# Patient Record
Sex: FEMALE | Race: WHITE | NOT HISPANIC OR LATINO
[De-identification: names, ages, dates, MRNs, and addresses within clinical notes are randomized per-mention and may not be internally consistent; named-entity substitution may affect disease eponyms.]

---

## 2018-02-01 ENCOUNTER — TRANSCRIPTION ENCOUNTER (OUTPATIENT)
Age: 58
End: 2018-02-01

## 2018-02-15 ENCOUNTER — MESSAGE (OUTPATIENT)
Age: 58
End: 2018-02-15

## 2018-05-11 ENCOUNTER — RESULT REVIEW (OUTPATIENT)
Age: 58
End: 2018-05-11

## 2018-06-02 ENCOUNTER — OUTPATIENT (OUTPATIENT)
Dept: OUTPATIENT SERVICES | Facility: HOSPITAL | Age: 58
LOS: 1 days | End: 2018-06-02
Payer: COMMERCIAL

## 2018-06-02 ENCOUNTER — APPOINTMENT (OUTPATIENT)
Dept: MAMMOGRAPHY | Facility: CLINIC | Age: 58
End: 2018-06-02
Payer: COMMERCIAL

## 2018-06-02 DIAGNOSIS — Z00.8 ENCOUNTER FOR OTHER GENERAL EXAMINATION: ICD-10-CM

## 2018-06-02 PROCEDURE — 77067 SCR MAMMO BI INCL CAD: CPT | Mod: 26

## 2018-06-02 PROCEDURE — 77063 BREAST TOMOSYNTHESIS BI: CPT

## 2018-06-02 PROCEDURE — 77063 BREAST TOMOSYNTHESIS BI: CPT | Mod: 26

## 2018-06-02 PROCEDURE — 77067 SCR MAMMO BI INCL CAD: CPT

## 2018-11-03 ENCOUNTER — TRANSCRIPTION ENCOUNTER (OUTPATIENT)
Age: 58
End: 2018-11-03

## 2020-04-25 ENCOUNTER — MESSAGE (OUTPATIENT)
Age: 60
End: 2020-04-25

## 2020-05-01 LAB
SARS-COV-2 IGG SERPL IA-ACNC: 0 INDEX
SARS-COV-2 IGG SERPL QL IA: NEGATIVE

## 2022-01-05 ENCOUNTER — EMERGENCY (EMERGENCY)
Facility: HOSPITAL | Age: 62
LOS: 0 days | Discharge: HOME | End: 2022-01-05
Attending: EMERGENCY MEDICINE
Payer: SELF-PAY

## 2022-01-05 VITALS
TEMPERATURE: 98 F | HEART RATE: 75 BPM | SYSTOLIC BLOOD PRESSURE: 156 MMHG | OXYGEN SATURATION: 100 % | RESPIRATION RATE: 17 BRPM | DIASTOLIC BLOOD PRESSURE: 82 MMHG

## 2022-01-05 VITALS — HEIGHT: 72 IN | WEIGHT: 149.91 LBS

## 2022-01-05 DIAGNOSIS — S09.90XA UNSPECIFIED INJURY OF HEAD, INITIAL ENCOUNTER: ICD-10-CM
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
## 2022-01-05 DIAGNOSIS — Z23 ENCOUNTER FOR IMMUNIZATION: ICD-10-CM
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
## 2022-01-05 DIAGNOSIS — S10.91XA ABRASION OF UNSPECIFIED PART OF NECK, INITIAL ENCOUNTER: ICD-10-CM

## 2022-01-05 PROCEDURE — 70450 CT HEAD/BRAIN W/O DYE: CPT | Mod: MG

## 2022-01-05 PROCEDURE — 99053 MED SERV 10PM-8AM 24 HR FAC: CPT

## 2022-01-05 PROCEDURE — 96372 THER/PROPH/DIAG INJ SC/IM: CPT

## 2022-01-05 PROCEDURE — 72125 CT NECK SPINE W/O DYE: CPT | Mod: MG

## 2022-01-05 PROCEDURE — G1004: CPT

## 2022-01-05 PROCEDURE — 70450 CT HEAD/BRAIN W/O DYE: CPT | Mod: 26,MG

## 2022-01-05 PROCEDURE — 72125 CT NECK SPINE W/O DYE: CPT | Mod: 26,MG

## 2022-01-05 PROCEDURE — 99284 EMERGENCY DEPT VISIT MOD MDM: CPT | Mod: 25

## 2022-01-05 PROCEDURE — 90715 TDAP VACCINE 7 YRS/> IM: CPT

## 2022-01-05 PROCEDURE — 99285 EMERGENCY DEPT VISIT HI MDM: CPT

## 2022-01-05 RX ORDER — ACETAMINOPHEN 500 MG
650 TABLET ORAL ONCE
Refills: 0 | Status: COMPLETED | OUTPATIENT
Start: 2022-01-05 | End: 2022-01-05

## 2022-01-05 RX ORDER — TETANUS TOXOID, REDUCED DIPHTHERIA TOXOID AND ACELLULAR PERTUSSIS VACCINE, ADSORBED 5; 2.5; 8; 8; 2.5 [IU]/.5ML; [IU]/.5ML; UG/.5ML; UG/.5ML; UG/.5ML
0.5 SUSPENSION INTRAMUSCULAR ONCE
Refills: 0 | Status: COMPLETED | OUTPATIENT
Start: 2022-01-05 | End: 2022-01-05

## 2022-01-05 RX ADMIN — TETANUS TOXOID, REDUCED DIPHTHERIA TOXOID AND ACELLULAR PERTUSSIS VACCINE, ADSORBED 0.5 MILLILITER(S): 5; 2.5; 8; 8; 2.5 SUSPENSION INTRAMUSCULAR at 09:17

## 2022-01-05 RX ADMIN — Medication 650 MILLIGRAM(S): at 10:19

## 2022-01-05 NOTE — ED PROVIDER NOTE - OBJECTIVE STATEMENT
60 y/o female with no significant PMHx presents to the ED s/p fall. 60 y/o female with no significant PMHx presents to the ED s/p fall. pt was in parking lot and slipped on black ice, pt hit front of head. not on AC. no LOC. now w/ pain in head and neck. + abrasion, unknown last tetanus.

## 2022-01-05 NOTE — ED ADULT NURSE NOTE - OBJECTIVE STATEMENT
pt report to ED s/p slip and fall in the parking lot walking into work, pt fell forward hitting her head, lip and left knee - LOC - vision changes or dizziness, denies blood thinners

## 2022-01-05 NOTE — ED PROVIDER NOTE - PHYSICAL EXAMINATION
GEN - NAD; well appearing; A+O x3   HEAD -  left frontal hematoma w/ abrasion   EYES - EOMI, no conjunctival pallor, no scleral icterus  ENT -   mucous membranes  moist , no discharge      NECK - Neck supple  PULM - CTA b/l,  symmetric breath sounds  COR -  RRR, S1 S2, no murmurs  ABD - , ND, NT, soft, no guarding, no rebound, no masses    BACK - no CVA tenderness, nontender spine     EXTREMS - no edema, no deformity, warm and well perfused    SKIN - no rash or bruising      NEUROLOGIC - alert, sensation nl, motor 5/5 RUE/LUE/RLE/LLE

## 2022-01-05 NOTE — ED PROVIDER NOTE - PATIENT PORTAL LINK FT
You can access the FollowMyHealth Patient Portal offered by [facility] by registering at the following website: [email or web address]. By joining [employer]’s FollowMyHealth portal, you will also be able to view your health information using other applications (apps) compatible with our system.

## 2022-01-05 NOTE — ED ADULT TRIAGE NOTE - CHIEF COMPLAINT QUOTE
patient fell in parking lot while walking into work.  patient c/o left knee, lip and head pain.  contusion noted to forehead.  patient denies anticoagulants and LOC.

## 2022-01-05 NOTE — ED PROVIDER NOTE - NSFOLLOWUPINSTRUCTIONS_ED_ALL_ED_FT
HEAD INJURY - AfterCare(R) Instructions(ER/ED)           Head Injury    WHAT YOU NEED TO KNOW:    A head injury can include your scalp, face, skull, or brain and range from mild to severe. Effects can appear immediately after the injury or develop later. The effects may last a short time or be permanent. Healthcare providers may want to check your recovery over time. Treatment may change as you recover or develop new health problems from the head injury.    DISCHARGE INSTRUCTIONS:    Call your local emergency number (911 in the [place]), or have someone else call if:   •You cannot be woken.      •You have a seizure.      •You stop responding to others or you faint.      •You have blurry or double vision.      •Your speech becomes slurred or confused.      •You have arm or leg weakness, loss of feeling, or new problems with coordination.      •Your pupils are larger than usual, or one pupil is a different size than the other.      •You have blood or clear fluid coming out of your ears or nose.      Return to the emergency department if:   •You have repeated or forceful vomiting.      •You feel confused.      •Your headache gets worse or becomes severe.      •You or someone caring for you notices that you are harder to wake than usual.      Call your doctor if:   •Your symptoms last longer than 6 weeks after the injury.      •You have questions or concerns about your condition or care.      Medicines:   •Acetaminophen decreases pain and fever. It is available without a doctor's order. Ask how much to take and how often to take it. Follow directions. Read the labels of all other medicines you are using to see if they also contain acetaminophen, or ask your doctor or pharmacist. Acetaminophen can cause liver damage if not taken correctly. Do not use more than 4 grams (4,000 milligrams) total of acetaminophen in one day.       •Take your medicine as directed. Contact your healthcare provider if you think your medicine is not helping or if you have side effects. Tell him or her if you are allergic to any medicine. Keep a list of the medicines, vitamins, and herbs you take. Include the amounts, and when and why you take them. Bring the list or the pill bottles to follow-up visits. Carry your medicine list with you in case of an emergency.      Self-care:   •Rest or do quiet activities. Limit your time watching TV, using the computer, or doing tasks that require a lot of thinking. Slowly return to your normal activities as directed. Do not play sports or do activities that may cause you to get hit in the head. Ask your healthcare provider when you can return to sports.      •Apply ice on your head for 15 to 20 minutes every hour or as directed. Use an ice pack, or put crushed ice in a plastic bag. Cover it with a towel before you apply it to your skin. Ice helps prevent tissue damage and decreases swelling and pain.      •Have someone stay with you for 24 hours , or as directed. This person can monitor you for problems and call for help if needed. When you are awake, the person should ask you a few questions every few hours to see if you are thinking clearly. An example is to ask your name or address.      Prevent another head injury:   •Wear a helmet that fits properly. Do this when you play sports, or ride a bike, scooter, or skateboard. Helmets help decrease your risk for a serious head injury. Talk to your healthcare provider about other ways you can protect yourself if you play sports.      •Wear your seatbelt every time you are in a car. This helps lower your risk for a head injury if you are in a car accident.      Follow up with your doctor as directed: Write down your questions so you remember to ask them during your visits.       © Copyright [employer] 2022           back to top                          © Copyright [employer] 2022 Head Injury    WHAT YOU NEED TO KNOW:    A head injury can include your scalp, face, skull, or brain and range from mild to severe. Effects can appear immediately after the injury or develop later. The effects may last a short time or be permanent. Healthcare providers may want to check your recovery over time. Treatment may change as you recover or develop new health problems from the head injury.    DISCHARGE INSTRUCTIONS:    Call your local emergency number (911 in the [place]), or have someone else call if:   •You cannot be woken.      •You have a seizure.      •You stop responding to others or you faint.      •You have blurry or double vision.      •Your speech becomes slurred or confused.      •You have arm or leg weakness, loss of feeling, or new problems with coordination.      •Your pupils are larger than usual, or one pupil is a different size than the other.      •You have blood or clear fluid coming out of your ears or nose.      Return to the emergency department if:   •You have repeated or forceful vomiting.      •You feel confused.      •Your headache gets worse or becomes severe.      •You or someone caring for you notices that you are harder to wake than usual.      Call your doctor if:   •Your symptoms last longer than 6 weeks after the injury.      •You have questions or concerns about your condition or care.      Medicines:   •Acetaminophen decreases pain and fever. It is available without a doctor's order. Ask how much to take and how often to take it. Follow directions. Read the labels of all other medicines you are using to see if they also contain acetaminophen, or ask your doctor or pharmacist. Acetaminophen can cause liver damage if not taken correctly. Do not use more than 4 grams (4,000 milligrams) total of acetaminophen in one day.       •Take your medicine as directed. Contact your healthcare provider if you think your medicine is not helping or if you have side effects. Tell him or her if you are allergic to any medicine. Keep a list of the medicines, vitamins, and herbs you take. Include the amounts, and when and why you take them. Bring the list or the pill bottles to follow-up visits. Carry your medicine list with you in case of an emergency.      Self-care:   •Rest or do quiet activities. Limit your time watching TV, using the computer, or doing tasks that require a lot of thinking. Slowly return to your normal activities as directed. Do not play sports or do activities that may cause you to get hit in the head. Ask your healthcare provider when you can return to sports.      •Apply ice on your head for 15 to 20 minutes every hour or as directed. Use an ice pack, or put crushed ice in a plastic bag. Cover it with a towel before you apply it to your skin. Ice helps prevent tissue damage and decreases swelling and pain.      •Have someone stay with you for 24 hours , or as directed. This person can monitor you for problems and call for help if needed. When you are awake, the person should ask you a few questions every few hours to see if you are thinking clearly. An example is to ask your name or address.      Prevent another head injury:   •Wear a helmet that fits properly. Do this when you play sports, or ride a bike, scooter, or skateboard. Helmets help decrease your risk for a serious head injury. Talk to your healthcare provider about other ways you can protect yourself if you play sports.      •Wear your seatbelt every time you are in a car. This helps lower your risk for a head injury if you are in a car accident.      Follow up with your doctor as directed: Write down your questions so you remember to ask them during your visits.       © Copyright [employer] 2022           back to top                          © Copyright [employer] 2022

## 2022-01-19 ENCOUNTER — APPOINTMENT (OUTPATIENT)
Dept: ORTHOPEDIC SURGERY | Facility: CLINIC | Age: 62
End: 2022-01-19
Payer: OTHER MISCELLANEOUS

## 2022-01-19 VITALS
BODY MASS INDEX: 20.32 KG/M2 | WEIGHT: 150 LBS | HEART RATE: 111 BPM | SYSTOLIC BLOOD PRESSURE: 127 MMHG | HEIGHT: 72 IN | DIASTOLIC BLOOD PRESSURE: 89 MMHG

## 2022-01-19 DIAGNOSIS — Z78.9 OTHER SPECIFIED HEALTH STATUS: ICD-10-CM

## 2022-01-19 PROCEDURE — 99202 OFFICE O/P NEW SF 15 MIN: CPT

## 2022-01-19 PROCEDURE — 99072 ADDL SUPL MATRL&STAF TM PHE: CPT

## 2022-01-19 PROCEDURE — 73560 X-RAY EXAM OF KNEE 1 OR 2: CPT | Mod: LT

## 2022-01-24 VITALS — WEIGHT: 150 LBS | HEIGHT: 72 IN | BODY MASS INDEX: 20.32 KG/M2

## 2022-02-02 ENCOUNTER — APPOINTMENT (OUTPATIENT)
Dept: ORTHOPEDIC SURGERY | Facility: CLINIC | Age: 62
End: 2022-02-02
Payer: OTHER MISCELLANEOUS

## 2022-02-02 DIAGNOSIS — S80.02XA CONTUSION OF LEFT KNEE, INITIAL ENCOUNTER: ICD-10-CM

## 2022-02-02 DIAGNOSIS — M17.12 UNILATERAL PRIMARY OSTEOARTHRITIS, LEFT KNEE: ICD-10-CM

## 2022-02-02 PROCEDURE — 99072 ADDL SUPL MATRL&STAF TM PHE: CPT

## 2022-02-02 PROCEDURE — 99212 OFFICE O/P EST SF 10 MIN: CPT

## 2022-02-07 PROBLEM — S80.02XA CONTUSION OF LEFT KNEE, INITIAL ENCOUNTER: Status: ACTIVE | Noted: 2022-01-19

## 2022-02-07 PROBLEM — M17.12 PRIMARY OSTEOARTHRITIS OF LEFT KNEE: Status: ACTIVE | Noted: 2022-01-19

## 2022-02-07 NOTE — REASON FOR VISIT
[Follow-Up Visit] : a follow-up visit for [FreeTextEntry2] : the left knee.  This visit is related to Workers' Compensation.  Date of Injury:  1/05/2022

## 2022-02-07 NOTE — DISCUSSION/SUMMARY
[de-identified] : At this time, due to osteoarthritis with contusion of the left knee, I recommend she undergo home therapeutic modalities and wound care. She will be reassessed in two weeks. I will allow her to return to work. \par \par The Workers' Compensation guidelines have been followed.\par

## 2022-02-07 NOTE — PHYSICAL EXAM
[de-identified] : Left Knee: \par Range of Motion in Degrees	\par 	  Claimant:	Normal:	\par Flexion Active	 120 	 135-degrees	\par Flexion Passive	 120	 135-degrees	\par Extension Active	 0	 0-5-degrees	\par Extension Passive	 0	 0-5-degrees	\par \par Significant prepatellar contusion with disruption of the soft tissue. No gross signs of infection. No weakness to flexion/extension. No evidence of instability in the AP plane or varus or valgus stress. Negative Lachman. Negative pivot shift. Negative anterior drawer test. Negative posterior drawer test. Negative Herminio. Negative Apley grind. No medial or lateral joint line tenderness. Positive tenderness over the medial and lateral facet of the patella. Positive patellofemoral crepitations. No lateral tilting patella. No patella apprehension. Positive crepitation in the medial and lateral femoral condyle. No proximal or distal swelling, edema or tenderness. No gross motor or sensory deficits. Mild intra-articular swelling. 2+ DP and PT pulses. Moderate valgus alignment. \par  [de-identified] : Ambulating with a slightly antalgic to antalgic gait.  Station:  Normal.  [de-identified] : Appearance:  Well-developed, well-nourished female in no acute distress.\par

## 2022-02-07 NOTE — ADDENDUM
[FreeTextEntry1] : This note was written by Sophie Read on 01/24/2022 acting as a scribe for KATY LEONARDO III, MD

## 2022-02-07 NOTE — HISTORY OF PRESENT ILLNESS
[(Does not interfere) 0] : the ailment interference is 0/10 (does not interfere) [de-identified] : The patient comes in today for her left knee status post a slip and fall in the parking lot at work.  She states that her pain has gone down. The patient states the onset/injury occurred on 1/5/2022.  [] : No

## 2022-02-07 NOTE — PHYSICAL EXAM
[de-identified] : Right Knee: \par Range of Motion in Degrees	\par 	                  Claimant:	Normal:	\par Flexion Active	  135 	                135-degrees	\par Flexion Passive	  135	                135-degrees	\par Extension Active	  0-5	                0-5-degrees	\par Extension Passive	  0-5	                0-5-degrees	\par \par No weakness to flexion/extension.  No evidence of instability in the AP plane or varus or valgus stress.  Negative  Lachman.  Negative pivot shift.  Negative anterior drawer test.  Negative posterior drawer test.  Negative Herminio.  Negative Apley grind.  No medial or lateral joint line tenderness.  No tenderness over the medial and lateral facet of the patella.  No patellofemoral crepitations.  No lateral tilting patella.  No patellar apprehension.  No crepitation in the medial and lateral femoral condyle.  No proximal or distal swelling, edema or tenderness.  No gross motor or sensory deficits.  No intra-articular swelling.  2+ DP and PT pulses. No varus or valgus malalignment.  Skin is intact.  No rashes, scars or lesions.  \par \par Left Knee: \par Range of Motion in Degrees	\par 	                  Claimant:	Normal:	\par Flexion Active	  120 	                135-degrees	\par Flexion Passive	  120	                135-degrees	\par Extension Active	    0	                0-5-degrees	\par Extension Passive	    0	                0-5-degrees	\par \par Significant prepatellar contusion with disruption of the soft tissue.  No gross signs of infection. No weakness to flexion/extension.  No evidence of instability in the AP plane or varus or valgus stress.  Negative  Lachman.  Negative pivot shift.  Negative anterior drawer test.  Negative posterior drawer test.  Negative Herminio.  Negative Apley grind.  No medial or lateral joint line tenderness.  Positive tenderness over the medial and lateral facet of the patella.  Positive patellofemoral crepitations.  No lateral tilting patella.  No patella apprehension.  Positive crepitation in the medial and lateral femoral condyle.  No proximal or distal swelling, edema or tenderness.  No gross motor or sensory deficits.  Mild intra-articular swelling.  2+ DP and PT pulses.  Moderate valgus alignment.     \par   [de-identified] : Ambulating with a slightly antalgic to antalgic gait.  Station:  Normal.  [de-identified] : Appearance:  Well-developed, well-nourished female in no acute distress.\par   [de-identified] : Radiographs, two views of the left knee, shows severe arthritis with valgus alignment.

## 2022-02-07 NOTE — DISCUSSION/SUMMARY
[de-identified] : At this time, due to osteoarthritis and contusion of the left knee and since she states she has no pain, we will allow her to return to full activity.  She will follow up with me on an as needed basis.\par \par The Workers' Compensation guidelines have been followed.\par

## 2022-02-07 NOTE — REASON FOR VISIT
[Initial Visit] : an initial visit for [FreeTextEntry2] : her left knee.  This injury is related to Workers' Compensation

## 2022-02-07 NOTE — ADDENDUM
[FreeTextEntry1] : This note was written by Sophie Read on 02/07/2022 acting as a scribe for KATY LEONARDO III, MD

## 2023-10-31 ENCOUNTER — INPATIENT (INPATIENT)
Facility: HOSPITAL | Age: 63
LOS: 8 days | Discharge: NURSING, REHAB OR LONG-TERM CARE | DRG: 854 | End: 2023-11-09
Attending: INTERNAL MEDICINE | Admitting: INTERNAL MEDICINE
Payer: COMMERCIAL

## 2023-10-31 VITALS — WEIGHT: 179.9 LBS | HEIGHT: 72 IN

## 2023-10-31 DIAGNOSIS — M86.9 OSTEOMYELITIS, UNSPECIFIED: ICD-10-CM

## 2023-10-31 LAB
ABO RH CONFIRMATION: SIGNIFICANT CHANGE UP
ABO RH CONFIRMATION: SIGNIFICANT CHANGE UP
ALBUMIN SERPL ELPH-MCNC: 2.4 G/DL — LOW (ref 3.3–5)
ALBUMIN SERPL ELPH-MCNC: 2.4 G/DL — LOW (ref 3.3–5)
ALP SERPL-CCNC: 111 U/L — SIGNIFICANT CHANGE UP (ref 40–120)
ALP SERPL-CCNC: 111 U/L — SIGNIFICANT CHANGE UP (ref 40–120)
ALT FLD-CCNC: 23 U/L — SIGNIFICANT CHANGE UP (ref 12–78)
ALT FLD-CCNC: 23 U/L — SIGNIFICANT CHANGE UP (ref 12–78)
ANION GAP SERPL CALC-SCNC: 9 MMOL/L — SIGNIFICANT CHANGE UP (ref 5–17)
ANION GAP SERPL CALC-SCNC: 9 MMOL/L — SIGNIFICANT CHANGE UP (ref 5–17)
APTT BLD: 31.7 SEC — SIGNIFICANT CHANGE UP (ref 24.5–35.6)
APTT BLD: 31.7 SEC — SIGNIFICANT CHANGE UP (ref 24.5–35.6)
AST SERPL-CCNC: 12 U/L — LOW (ref 15–37)
AST SERPL-CCNC: 12 U/L — LOW (ref 15–37)
BASOPHILS # BLD AUTO: 0.08 K/UL — SIGNIFICANT CHANGE UP (ref 0–0.2)
BASOPHILS # BLD AUTO: 0.08 K/UL — SIGNIFICANT CHANGE UP (ref 0–0.2)
BASOPHILS NFR BLD AUTO: 0.5 % — SIGNIFICANT CHANGE UP (ref 0–2)
BASOPHILS NFR BLD AUTO: 0.5 % — SIGNIFICANT CHANGE UP (ref 0–2)
BILIRUB SERPL-MCNC: 0.8 MG/DL — SIGNIFICANT CHANGE UP (ref 0.2–1.2)
BILIRUB SERPL-MCNC: 0.8 MG/DL — SIGNIFICANT CHANGE UP (ref 0.2–1.2)
BLD GP AB SCN SERPL QL: SIGNIFICANT CHANGE UP
BLD GP AB SCN SERPL QL: SIGNIFICANT CHANGE UP
BUN SERPL-MCNC: 12 MG/DL — SIGNIFICANT CHANGE UP (ref 7–23)
BUN SERPL-MCNC: 12 MG/DL — SIGNIFICANT CHANGE UP (ref 7–23)
CALCIUM SERPL-MCNC: 8.9 MG/DL — SIGNIFICANT CHANGE UP (ref 8.5–10.1)
CALCIUM SERPL-MCNC: 8.9 MG/DL — SIGNIFICANT CHANGE UP (ref 8.5–10.1)
CHLORIDE SERPL-SCNC: 95 MMOL/L — LOW (ref 96–108)
CHLORIDE SERPL-SCNC: 95 MMOL/L — LOW (ref 96–108)
CO2 SERPL-SCNC: 25 MMOL/L — SIGNIFICANT CHANGE UP (ref 22–31)
CO2 SERPL-SCNC: 25 MMOL/L — SIGNIFICANT CHANGE UP (ref 22–31)
CREAT SERPL-MCNC: 0.87 MG/DL — SIGNIFICANT CHANGE UP (ref 0.5–1.3)
CREAT SERPL-MCNC: 0.87 MG/DL — SIGNIFICANT CHANGE UP (ref 0.5–1.3)
EGFR: 74 ML/MIN/1.73M2 — SIGNIFICANT CHANGE UP
EGFR: 74 ML/MIN/1.73M2 — SIGNIFICANT CHANGE UP
EOSINOPHIL # BLD AUTO: 0.01 K/UL — SIGNIFICANT CHANGE UP (ref 0–0.5)
EOSINOPHIL # BLD AUTO: 0.01 K/UL — SIGNIFICANT CHANGE UP (ref 0–0.5)
EOSINOPHIL NFR BLD AUTO: 0.1 % — SIGNIFICANT CHANGE UP (ref 0–6)
EOSINOPHIL NFR BLD AUTO: 0.1 % — SIGNIFICANT CHANGE UP (ref 0–6)
ERYTHROCYTE [SEDIMENTATION RATE] IN BLOOD: 95 MM/HR — HIGH (ref 0–20)
ERYTHROCYTE [SEDIMENTATION RATE] IN BLOOD: 95 MM/HR — HIGH (ref 0–20)
GLUCOSE BLDC GLUCOMTR-MCNC: 404 MG/DL — HIGH (ref 70–99)
GLUCOSE BLDC GLUCOMTR-MCNC: 404 MG/DL — HIGH (ref 70–99)
GLUCOSE BLDC GLUCOMTR-MCNC: 473 MG/DL — CRITICAL HIGH (ref 70–99)
GLUCOSE BLDC GLUCOMTR-MCNC: 473 MG/DL — CRITICAL HIGH (ref 70–99)
GLUCOSE SERPL-MCNC: 325 MG/DL — HIGH (ref 70–99)
GLUCOSE SERPL-MCNC: 325 MG/DL — HIGH (ref 70–99)
HCT VFR BLD CALC: 37.2 % — SIGNIFICANT CHANGE UP (ref 34.5–45)
HCT VFR BLD CALC: 37.2 % — SIGNIFICANT CHANGE UP (ref 34.5–45)
HGB BLD-MCNC: 12.4 G/DL — SIGNIFICANT CHANGE UP (ref 11.5–15.5)
HGB BLD-MCNC: 12.4 G/DL — SIGNIFICANT CHANGE UP (ref 11.5–15.5)
IMM GRANULOCYTES NFR BLD AUTO: 0.7 % — SIGNIFICANT CHANGE UP (ref 0–0.9)
IMM GRANULOCYTES NFR BLD AUTO: 0.7 % — SIGNIFICANT CHANGE UP (ref 0–0.9)
INR BLD: 1.22 RATIO — HIGH (ref 0.85–1.18)
INR BLD: 1.22 RATIO — HIGH (ref 0.85–1.18)
LACTATE SERPL-SCNC: 1.3 MMOL/L — SIGNIFICANT CHANGE UP (ref 0.7–2)
LACTATE SERPL-SCNC: 1.3 MMOL/L — SIGNIFICANT CHANGE UP (ref 0.7–2)
LYMPHOCYTES # BLD AUTO: 0.95 K/UL — LOW (ref 1–3.3)
LYMPHOCYTES # BLD AUTO: 0.95 K/UL — LOW (ref 1–3.3)
LYMPHOCYTES # BLD AUTO: 5.8 % — LOW (ref 13–44)
LYMPHOCYTES # BLD AUTO: 5.8 % — LOW (ref 13–44)
MCHC RBC-ENTMCNC: 29.1 PG — SIGNIFICANT CHANGE UP (ref 27–34)
MCHC RBC-ENTMCNC: 29.1 PG — SIGNIFICANT CHANGE UP (ref 27–34)
MCHC RBC-ENTMCNC: 33.3 GM/DL — SIGNIFICANT CHANGE UP (ref 32–36)
MCHC RBC-ENTMCNC: 33.3 GM/DL — SIGNIFICANT CHANGE UP (ref 32–36)
MCV RBC AUTO: 87.3 FL — SIGNIFICANT CHANGE UP (ref 80–100)
MCV RBC AUTO: 87.3 FL — SIGNIFICANT CHANGE UP (ref 80–100)
MONOCYTES # BLD AUTO: 0.99 K/UL — HIGH (ref 0–0.9)
MONOCYTES # BLD AUTO: 0.99 K/UL — HIGH (ref 0–0.9)
MONOCYTES NFR BLD AUTO: 6.1 % — SIGNIFICANT CHANGE UP (ref 2–14)
MONOCYTES NFR BLD AUTO: 6.1 % — SIGNIFICANT CHANGE UP (ref 2–14)
NEUTROPHILS # BLD AUTO: 14.09 K/UL — HIGH (ref 1.8–7.4)
NEUTROPHILS # BLD AUTO: 14.09 K/UL — HIGH (ref 1.8–7.4)
NEUTROPHILS NFR BLD AUTO: 86.8 % — HIGH (ref 43–77)
NEUTROPHILS NFR BLD AUTO: 86.8 % — HIGH (ref 43–77)
PLATELET # BLD AUTO: 313 K/UL — SIGNIFICANT CHANGE UP (ref 150–400)
PLATELET # BLD AUTO: 313 K/UL — SIGNIFICANT CHANGE UP (ref 150–400)
POTASSIUM SERPL-MCNC: 4 MMOL/L — SIGNIFICANT CHANGE UP (ref 3.5–5.3)
POTASSIUM SERPL-MCNC: 4 MMOL/L — SIGNIFICANT CHANGE UP (ref 3.5–5.3)
POTASSIUM SERPL-SCNC: 4 MMOL/L — SIGNIFICANT CHANGE UP (ref 3.5–5.3)
POTASSIUM SERPL-SCNC: 4 MMOL/L — SIGNIFICANT CHANGE UP (ref 3.5–5.3)
PROT SERPL-MCNC: 7.4 GM/DL — SIGNIFICANT CHANGE UP (ref 6–8.3)
PROT SERPL-MCNC: 7.4 GM/DL — SIGNIFICANT CHANGE UP (ref 6–8.3)
PROTHROM AB SERPL-ACNC: 13.7 SEC — HIGH (ref 9.5–13)
PROTHROM AB SERPL-ACNC: 13.7 SEC — HIGH (ref 9.5–13)
RBC # BLD: 4.26 M/UL — SIGNIFICANT CHANGE UP (ref 3.8–5.2)
RBC # BLD: 4.26 M/UL — SIGNIFICANT CHANGE UP (ref 3.8–5.2)
RBC # FLD: 12.4 % — SIGNIFICANT CHANGE UP (ref 10.3–14.5)
RBC # FLD: 12.4 % — SIGNIFICANT CHANGE UP (ref 10.3–14.5)
SODIUM SERPL-SCNC: 129 MMOL/L — LOW (ref 135–145)
SODIUM SERPL-SCNC: 129 MMOL/L — LOW (ref 135–145)
URATE SERPL-MCNC: 3.7 MG/DL — SIGNIFICANT CHANGE UP (ref 2.5–7)
URATE SERPL-MCNC: 3.7 MG/DL — SIGNIFICANT CHANGE UP (ref 2.5–7)
WBC # BLD: 16.24 K/UL — HIGH (ref 3.8–10.5)
WBC # BLD: 16.24 K/UL — HIGH (ref 3.8–10.5)
WBC # FLD AUTO: 16.24 K/UL — HIGH (ref 3.8–10.5)
WBC # FLD AUTO: 16.24 K/UL — HIGH (ref 3.8–10.5)

## 2023-10-31 PROCEDURE — A9579: CPT

## 2023-10-31 PROCEDURE — 93970 EXTREMITY STUDY: CPT | Mod: 26

## 2023-10-31 PROCEDURE — 85025 COMPLETE CBC W/AUTO DIFF WBC: CPT

## 2023-10-31 PROCEDURE — 36415 COLL VENOUS BLD VENIPUNCTURE: CPT

## 2023-10-31 PROCEDURE — 97116 GAIT TRAINING THERAPY: CPT | Mod: GP

## 2023-10-31 PROCEDURE — 83036 HEMOGLOBIN GLYCOSYLATED A1C: CPT

## 2023-10-31 PROCEDURE — 87206 SMEAR FLUORESCENT/ACID STAI: CPT

## 2023-10-31 PROCEDURE — 88304 TISSUE EXAM BY PATHOLOGIST: CPT

## 2023-10-31 PROCEDURE — 71045 X-RAY EXAM CHEST 1 VIEW: CPT

## 2023-10-31 PROCEDURE — 87075 CULTR BACTERIA EXCEPT BLOOD: CPT

## 2023-10-31 PROCEDURE — 82962 GLUCOSE BLOOD TEST: CPT

## 2023-10-31 PROCEDURE — 73720 MRI LWR EXTREMITY W/O&W/DYE: CPT | Mod: 26,RT

## 2023-10-31 PROCEDURE — 36569 INSJ PICC 5 YR+ W/O IMAGING: CPT

## 2023-10-31 PROCEDURE — 87077 CULTURE AEROBIC IDENTIFY: CPT

## 2023-10-31 PROCEDURE — 73720 MRI LWR EXTREMITY W/O&W/DYE: CPT | Mod: ME,RT

## 2023-10-31 PROCEDURE — 73630 X-RAY EXAM OF FOOT: CPT | Mod: 26,50

## 2023-10-31 PROCEDURE — 85027 COMPLETE CBC AUTOMATED: CPT

## 2023-10-31 PROCEDURE — 87015 SPECIMEN INFECT AGNT CONCNTJ: CPT

## 2023-10-31 PROCEDURE — 88305 TISSUE EXAM BY PATHOLOGIST: CPT

## 2023-10-31 PROCEDURE — 87070 CULTURE OTHR SPECIMN AEROBIC: CPT

## 2023-10-31 PROCEDURE — 87040 BLOOD CULTURE FOR BACTERIA: CPT

## 2023-10-31 PROCEDURE — 86803 HEPATITIS C AB TEST: CPT

## 2023-10-31 PROCEDURE — 93010 ELECTROCARDIOGRAM REPORT: CPT

## 2023-10-31 PROCEDURE — C1751: CPT

## 2023-10-31 PROCEDURE — 80053 COMPREHEN METABOLIC PANEL: CPT

## 2023-10-31 PROCEDURE — 80061 LIPID PANEL: CPT

## 2023-10-31 PROCEDURE — G1004: CPT

## 2023-10-31 PROCEDURE — 87116 MYCOBACTERIA CULTURE: CPT

## 2023-10-31 PROCEDURE — 87102 FUNGUS ISOLATION CULTURE: CPT

## 2023-10-31 PROCEDURE — 99285 EMERGENCY DEPT VISIT HI MDM: CPT

## 2023-10-31 PROCEDURE — 71046 X-RAY EXAM CHEST 2 VIEWS: CPT | Mod: 26

## 2023-10-31 PROCEDURE — 93925 LOWER EXTREMITY STUDY: CPT

## 2023-10-31 PROCEDURE — 73620 X-RAY EXAM OF FOOT: CPT | Mod: RT

## 2023-10-31 PROCEDURE — 99223 1ST HOSP IP/OBS HIGH 75: CPT

## 2023-10-31 PROCEDURE — 93306 TTE W/DOPPLER COMPLETE: CPT

## 2023-10-31 PROCEDURE — 80202 ASSAY OF VANCOMYCIN: CPT

## 2023-10-31 PROCEDURE — 88311 DECALCIFY TISSUE: CPT

## 2023-10-31 PROCEDURE — 97163 PT EVAL HIGH COMPLEX 45 MIN: CPT | Mod: GP

## 2023-10-31 PROCEDURE — 80048 BASIC METABOLIC PNL TOTAL CA: CPT

## 2023-10-31 RX ORDER — SODIUM CHLORIDE 9 MG/ML
1000 INJECTION, SOLUTION INTRAVENOUS
Refills: 0 | Status: DISCONTINUED | OUTPATIENT
Start: 2023-10-31 | End: 2023-11-09

## 2023-10-31 RX ORDER — VANCOMYCIN HCL 1 G
1500 VIAL (EA) INTRAVENOUS EVERY 12 HOURS
Refills: 0 | Status: DISCONTINUED | OUTPATIENT
Start: 2023-10-31 | End: 2023-11-01

## 2023-10-31 RX ORDER — SODIUM CHLORIDE 9 MG/ML
1000 INJECTION INTRAMUSCULAR; INTRAVENOUS; SUBCUTANEOUS
Refills: 0 | Status: DISCONTINUED | OUTPATIENT
Start: 2023-10-31 | End: 2023-11-02

## 2023-10-31 RX ORDER — DEXTROSE 50 % IN WATER 50 %
25 SYRINGE (ML) INTRAVENOUS ONCE
Refills: 0 | Status: DISCONTINUED | OUTPATIENT
Start: 2023-10-31 | End: 2023-11-09

## 2023-10-31 RX ORDER — DEXTROSE 50 % IN WATER 50 %
12.5 SYRINGE (ML) INTRAVENOUS ONCE
Refills: 0 | Status: DISCONTINUED | OUTPATIENT
Start: 2023-10-31 | End: 2023-11-09

## 2023-10-31 RX ORDER — DEXTROSE 50 % IN WATER 50 %
15 SYRINGE (ML) INTRAVENOUS ONCE
Refills: 0 | Status: DISCONTINUED | OUTPATIENT
Start: 2023-10-31 | End: 2023-11-09

## 2023-10-31 RX ORDER — PIPERACILLIN AND TAZOBACTAM 4; .5 G/20ML; G/20ML
3.38 INJECTION, POWDER, LYOPHILIZED, FOR SOLUTION INTRAVENOUS ONCE
Refills: 0 | Status: COMPLETED | OUTPATIENT
Start: 2023-10-31 | End: 2023-10-31

## 2023-10-31 RX ORDER — VANCOMYCIN HCL 1 G
1250 VIAL (EA) INTRAVENOUS ONCE
Refills: 0 | Status: COMPLETED | OUTPATIENT
Start: 2023-10-31 | End: 2023-10-31

## 2023-10-31 RX ORDER — INSULIN LISPRO 100/ML
VIAL (ML) SUBCUTANEOUS AT BEDTIME
Refills: 0 | Status: DISCONTINUED | OUTPATIENT
Start: 2023-10-31 | End: 2023-11-09

## 2023-10-31 RX ORDER — INSULIN GLARGINE 100 [IU]/ML
8 INJECTION, SOLUTION SUBCUTANEOUS AT BEDTIME
Refills: 0 | Status: DISCONTINUED | OUTPATIENT
Start: 2023-10-31 | End: 2023-11-02

## 2023-10-31 RX ORDER — GLUCAGON INJECTION, SOLUTION 0.5 MG/.1ML
1 INJECTION, SOLUTION SUBCUTANEOUS ONCE
Refills: 0 | Status: DISCONTINUED | OUTPATIENT
Start: 2023-10-31 | End: 2023-11-09

## 2023-10-31 RX ORDER — ACETAMINOPHEN 500 MG
650 TABLET ORAL EVERY 6 HOURS
Refills: 0 | Status: DISCONTINUED | OUTPATIENT
Start: 2023-10-31 | End: 2023-11-09

## 2023-10-31 RX ORDER — ONDANSETRON 8 MG/1
4 TABLET, FILM COATED ORAL EVERY 8 HOURS
Refills: 0 | Status: DISCONTINUED | OUTPATIENT
Start: 2023-10-31 | End: 2023-11-09

## 2023-10-31 RX ORDER — LANOLIN ALCOHOL/MO/W.PET/CERES
3 CREAM (GRAM) TOPICAL AT BEDTIME
Refills: 0 | Status: DISCONTINUED | OUTPATIENT
Start: 2023-10-31 | End: 2023-11-09

## 2023-10-31 RX ORDER — PIPERACILLIN AND TAZOBACTAM 4; .5 G/20ML; G/20ML
3.38 INJECTION, POWDER, LYOPHILIZED, FOR SOLUTION INTRAVENOUS EVERY 8 HOURS
Refills: 0 | Status: DISCONTINUED | OUTPATIENT
Start: 2023-10-31 | End: 2023-11-01

## 2023-10-31 RX ORDER — INSULIN LISPRO 100/ML
VIAL (ML) SUBCUTANEOUS
Refills: 0 | Status: DISCONTINUED | OUTPATIENT
Start: 2023-10-31 | End: 2023-11-09

## 2023-10-31 RX ADMIN — PIPERACILLIN AND TAZOBACTAM 200 GRAM(S): 4; .5 INJECTION, POWDER, LYOPHILIZED, FOR SOLUTION INTRAVENOUS at 16:02

## 2023-10-31 RX ADMIN — PIPERACILLIN AND TAZOBACTAM 25 GRAM(S): 4; .5 INJECTION, POWDER, LYOPHILIZED, FOR SOLUTION INTRAVENOUS at 22:25

## 2023-10-31 RX ADMIN — INSULIN GLARGINE 8 UNIT(S): 100 INJECTION, SOLUTION SUBCUTANEOUS at 21:38

## 2023-10-31 RX ADMIN — Medication 8: at 22:08

## 2023-10-31 RX ADMIN — PIPERACILLIN AND TAZOBACTAM 25 GRAM(S): 4; .5 INJECTION, POWDER, LYOPHILIZED, FOR SOLUTION INTRAVENOUS at 21:38

## 2023-10-31 RX ADMIN — Medication 250 MILLIGRAM(S): at 16:45

## 2023-10-31 RX ADMIN — Medication 250 MILLIGRAM(S): at 23:22

## 2023-10-31 NOTE — ED STATDOCS - MUSCULOSKELETAL, MLM
range of motion is not limited and there is no muscle tenderness. range of motion is not limited and there is no muscle tenderness, distal n/v/m intact

## 2023-10-31 NOTE — ED STATDOCS - NS ED ATTENDING STATEMENT MOD
This was a shared visit with the [facility]. I reviewed and verified the documentation and independently performed the documented:

## 2023-10-31 NOTE — ED STATDOCS - ATTENDING APP SHARED VISIT CONTRIBUTION OF CARE
I, Jay Robertson MD,  performed the initial face to face bedside interview with this patient regarding history of present illness, review of symptoms and relevant past medical, social and family history.  I completed an independent physical examination.  I was the initial provider who evaluated this patient.   I personally saw the patient and performed a substantive portion of the visit including all aspects of the medical decision making.  I have signed out the follow up of any pending tests (i.e. labs, radiological studies) to the [facility].  I have communicated the patient’s plan of care and disposition with the [facility].  The history, relevant review of systems, past medical and surgical history, medical decision making, and physical examination was documented by the scribe in my presence and I attest to the accuracy of the documentation.

## 2023-10-31 NOTE — CHART NOTE - NSCHARTNOTEFT_GEN_A_CORE
Received a call from nurse at about 9:35pm to assess patient after a mechanical fall. Patient reports that she was in the bathroom and did not have her non-slip socks on. States that when she went to stand up, she slipped and fell onto her buttocks. States that she did not hit her head or injure herself. Patient was able to get up with some assistance from the nurse. Patient denies any hip pain, back pain, headache, and all other acute complaints.     T(C): 37.7 (10-31-23 @ 21:24), Max: 37.7 (10-31-23 @ 21:24)  HR: 94 (10-31-23 @ 18:12) (94 - 104)  BP: 118/75 (10-31-23 @ 21:24) (118/75 - 144/69)  RR: 18 (10-31-23 @ 21:24) (18 - 18)  SpO2: 98% (10-31-23 @ 21:24) (96% - 98%)    CONSTITUTIONAL: Well groomed, no apparent distress  EYES: PERRLA and symmetric, EOMI, No conjunctival or scleral injection, non-icteric  NECK: Supple, symmetric and without tracheal deviation   RESP: No respiratory distress, no use of accessory muscles; CTA b/l, no WRR  CV: RRR, +S1S2, no MRG; no JVD; no peripheral edema  GI: Soft, NT, ND, no rebound, no guarding; no palpable masses; no hepatosplenomegaly; no hernia palpated  MSK: Normal gait; No digital clubbing or cyanosis; examination of the (head/neck/spine/ribs/pelvis, RUE, LUE, RLE, LLE) without misalignment,            Normal ROM without pain, no spinal tenderness, normal muscle strength/tone  SKIN: No rashes or ulcers noted; no subcutaneous nodules or induration palpable  NEURO: CN II-XII intact; normal reflexes in upper and lower extremities, sensation intact in upper and lower extremities b/l to light touch   PSYCH: Appropriate insight/judgment; A+O x 3, mood and affect appropriate, recent/remote memory intact    Plan:   Fall   - Put non-slip socks on patient   - Place bed alarm on

## 2023-10-31 NOTE — ED STATDOCS - SKIN, MLM
skin normal color for race, warm, dry and intact. right big toe swollen, ulcerated at the base, no active drainage, distal and motor NVI skin normal color for race, right big toe swollen, ulcerated at the base, no active drainage, strong smell fro foot

## 2023-10-31 NOTE — ED STATDOCS - OBJECTIVE STATEMENT
63 year old female with no pertinent PMHx; presents to the ED sent in by Dr. Oliver for IV antibiotics, labs, MRI and chest x-ray. Patient c/o right toe erythema and swelling. Patient reports taking antibiotics for the symptoms with minimal relief. Able to ambulate on right foot at this time. NKDA. 63 year old female with no pertinent PMHx; presents to the ED sent in by Dr. Oliver for IV antibiotics, labs, MRI and  x-ray. Patient c/o right toe erythema and swelling. Symptoms have been going on for weeks.  Patient reports taking antibiotics for the symptoms with minimal relief. Able to ambulate on right foot at this time. NKDA.

## 2023-10-31 NOTE — ED STATDOCS - PROGRESS NOTE DETAILS
62 y/o female sent to ED by Dr. Oliver for admission with IV abx for right toe infection, concern for osteomyelitis. On exam pt with right hallux swelling, erythema, ulcer to base with strong odor, no active discharge, NVI, BLE edema. Will follow labs, XR, BLE US for BLE edema, start abx, and admit to medicine. - Krysta Arana PA-C

## 2023-10-31 NOTE — H&P ADULT - ASSESSMENT
63F w/no sig PMH, presents c/o infection of Right first toe w/ malodorous drainage occurring over past several weeks.   In ED, given zosyn, vanco, seen by podiatry (note reviewed), gluc 325, Na 129, temp 99, HR 100s, sbp 140s  MRI Rt foot 1.  Extensive osseous destruction of the 1st toe is again seen consistent with osteomyelitis. 2.  Faint marrow edema of the 1st metatarsal head and hallux sesamoids   may be reactive in the absence of confluent T1 marrow replacement, however, early infection cannot be absolutely excluded.      #Osteomyelitis w/ cellulitis of Rt first toe  - check cultures  - start IV abx- vanco, zosyn  - ID cs  - Podiatry cs    #New onset T2DM- gluc >300  - start Lantus 0.1unti/kg= 8units  - start SSI, monitor FS and titrate insulin as needed  - check HA1c  - check lipid profile  - give diabetic education  - nutrition cs for diet education  - diabetic diet     #Hyponatremia- likely 2/2 hyperglycemia above  - check AM labs  - NS @125cc/hr    #HTN- new onset  - monitor bp, if consistently elevated, may need to start med for discharge    #poor compliance  - pt will new appt w/ PCP f/u to monitor above and adjust meds  - check lipid     #PPX- ambulate

## 2023-10-31 NOTE — ED STATDOCS - CLINICAL SUMMARY MEDICAL DECISION MAKING FREE TEXT BOX
63 year old female presents to the ED sent in by Dr. Hernandez for infection of right big toe. Labs and admission for IV antibiotics. 63 year old female presents to the ED sent in by Dr. Oliver for infection of right big toe. Labs and admission for IV antibiotics.

## 2023-10-31 NOTE — H&P ADULT - HISTORY OF PRESENT ILLNESS
HPI:  63F w/no sig PMH, presents c/o infection of Right first toe w/ malodorous drainage occurring over past several weeks. She's been sent in by her podiatrist for IV abx for osteomyelitis. Pt denies any pain, fever/chills, n/v/d, abd pain, dysuria, cp, sob.  Pt states this started after getting a pedicure. She has not been to any doctor for many years.     In ED, given zosyn, vanco, seen by podiatry (note reviewed), gluc 325, Na 129, temp 99, HR 100s, sbp 140s  MRI Rt foot 1.  Extensive osseous destruction of the 1st toe is again seen consistent with osteomyelitis. 2.  Faint marrow edema of the 1st metatarsal head and hallux sesamoids   may be reactive in the absence of confluent T1 marrow replacement, however, early infection cannot be absolutely excluded.    LE doppler b/l NEG for dvt    PAST MEDICAL & SURGICAL HISTORY:  no sig PMH/PSH    Review of Systems:   CONSTITUTIONAL: No fever.  EYES: No eye pain or discharge.  ENMT:  No sinus or throat pain  NECK: No pain or stiffness  RESPIRATORY: No cough, wheezing, chills or hemoptysis; No shortness of breath  CARDIOVASCULAR: No chest pain, palpitations, dizziness, or leg swelling  GASTROINTESTINAL: No abdominal or epigastric pain. No nausea, vomiting, or hematemesis; No diarrhea or constipation. No melena or hematochezia.  GENITOURINARY: No dysuria or incontinence  NEUROLOGICAL: No headaches, memory loss, loss of strength, numbness, or tremors  SKIN: No rashes.  MUSCULOSKELETAL: No joint pain or swelling; No muscle, back, or extremity pain ++ see hpi   PSYCHIATRIC: No depression, anxiety, mood swings, or difficulty sleeping    Social History:   denies smoking/etoh/drug use  lives alone, ind ADLs    FAMILY HISTORY:  pt denies       MEDICATIONS  (STANDING):  dextrose 5%. 1000 milliLiter(s) (100 mL/Hr) IV Continuous <Continuous>  dextrose 5%. 1000 milliLiter(s) (50 mL/Hr) IV Continuous <Continuous>  dextrose 50% Injectable 12.5 Gram(s) IV Push once  dextrose 50% Injectable 25 Gram(s) IV Push once  dextrose 50% Injectable 25 Gram(s) IV Push once  glucagon  Injectable 1 milliGRAM(s) IntraMuscular once  insulin glargine Injectable (LANTUS) 8 Unit(s) SubCutaneous at bedtime  insulin lispro (ADMELOG) corrective regimen sliding scale   SubCutaneous at bedtime  insulin lispro (ADMELOG) corrective regimen sliding scale   SubCutaneous three times a day before meals  piperacillin/tazobactam IVPB.. 3.375 Gram(s) IV Intermittent once    MEDICATIONS  (PRN):  acetaminophen     Tablet .. 650 milliGRAM(s) Oral every 6 hours PRN Temp greater or equal to 38C (100.4F), Mild Pain (1 - 3)  aluminum hydroxide/magnesium hydroxide/simethicone Suspension 30 milliLiter(s) Oral every 4 hours PRN Dyspepsia  dextrose Oral Gel 15 Gram(s) Oral once PRN Blood Glucose LESS THAN 70 milliGRAM(s)/deciliter  melatonin 3 milliGRAM(s) Oral at bedtime PRN Insomnia  ondansetron Injectable 4 milliGRAM(s) IV Push every 8 hours PRN Nausea and/or Vomiting      PHYSICAL EXAM:  Vital Signs Last 24 Hrs  T(C): 37.4 (31 Oct 2023 18:12), Max: 37.4 (31 Oct 2023 18:12)  T(F): 99.3 (31 Oct 2023 18:12), Max: 99.3 (31 Oct 2023 18:12)  HR: 94 (31 Oct 2023 18:12) (94 - 104)  BP: 144/69 (31 Oct 2023 18:12) (140/72 - 144/69)  BP(mean): 88 (31 Oct 2023 13:49) (88 - 88)  RR: 18 (31 Oct 2023 18:12) (18 - 18)  SpO2: 96% (31 Oct 2023 18:12) (96% - 98%)    Parameters below as of 31 Oct 2023 18:12  Patient On (Oxygen Delivery Method): room air  GENERAL: NAD,   HEAD:  Atraumatic, Normocephalic  EYES: EOMI, PERRLA, conjunctiva and sclera clear  ENT: normal hearing, no nasal discharge, throat clear, dentition normal  NECK: Supple, No JVD, no LAD, no thyromegaly   CHEST/LUNG: Clear to auscultation bilaterally; No wheeze, respirations unlabored  HEART: Regular rate and rhythm; No murmurs, rubs, or gallops  ABDOMEN: Soft, Nontender, Nondistended; Bowel sounds present, no HSM  EXTREMITIES:  dec Peripheral Pulses of LE, No clubbing, cyanosis, +mild  edema RLE, RT first toe w/ swelling/erythema/draining ulcer  PSYCH: AAOx3, normal behavior  NEUROLOGY: non-focal, sensory and cn 2-12 intact, speech/language intact  SKIN: No visible rashes or lesions    LABS:                        12.4   16.24 )-----------( 313      ( 31 Oct 2023 15:27 )             37.2     10-31    129<L>  |  95<L>  |  12  ----------------------------<  325<H>  4.0   |  25  |  0.87    Ca    8.9      31 Oct 2023 15:27    TPro  7.4  /  Alb  2.4<L>  /  TBili  0.8  /  DBili  x   /  AST  12<L>  /  ALT  23  /  AlkPhos  111  10-31    PT/INR - ( 31 Oct 2023 15:27 )   PT: 13.7 sec;   INR: 1.22 ratio         PTT - ( 31 Oct 2023 15:27 )  PTT:31.7 sec      Urinalysis Basic - ( 31 Oct 2023 15:27 )    Color: x / Appearance: x / SG: x / pH: x  Gluc: 325 mg/dL / Ketone: x  / Bili: x / Urobili: x   Blood: x / Protein: x / Nitrite: x   Leuk Esterase: x / RBC: x / WBC x   Sq Epi: x / Non Sq Epi: x / Bacteria: x        RADIOLOGY & ADDITIONAL TESTS:    Imaging Personally Reviewed:  MRI Rt foot 1.  Extensive osseous destruction of the 1st toe is again seen consistent with osteomyelitis. 2.  Faint marrow edema of the 1st metatarsal head and hallux sesamoids   may be reactive in the absence of confluent T1 marrow replacement, however, early infection cannot be absolutely excluded.    LE doppler b/l NEG for dvt    EKG Personally Reviewed:    Consultant(s) Notes Reviewed:      Care Discussed with Consultants/Other Providers:

## 2023-10-31 NOTE — PHARMACOTHERAPY INTERVENTION NOTE - COMMENTS
Medication reconciliation completed.  Reviewed Medication list and confirmed med allergies with patient; confirmed with Dr. [facility].

## 2023-11-01 DIAGNOSIS — M86.171 OTHER ACUTE OSTEOMYELITIS, RIGHT ANKLE AND FOOT: ICD-10-CM

## 2023-11-01 DIAGNOSIS — E11.9 TYPE 2 DIABETES MELLITUS WITHOUT COMPLICATIONS: ICD-10-CM

## 2023-11-01 DIAGNOSIS — I73.9 PERIPHERAL VASCULAR DISEASE, UNSPECIFIED: ICD-10-CM

## 2023-11-01 LAB
-  STREPTOCOCCUS SP. (NOT GRP A, B OR S PNEUMONIAE): SIGNIFICANT CHANGE UP
-  STREPTOCOCCUS SP. (NOT GRP A, B OR S PNEUMONIAE): SIGNIFICANT CHANGE UP
A1C WITH ESTIMATED AVERAGE GLUCOSE RESULT: 11.6 % — HIGH (ref 4–5.6)
A1C WITH ESTIMATED AVERAGE GLUCOSE RESULT: 11.6 % — HIGH (ref 4–5.6)
ALBUMIN SERPL ELPH-MCNC: 1.9 G/DL — LOW (ref 3.3–5)
ALBUMIN SERPL ELPH-MCNC: 1.9 G/DL — LOW (ref 3.3–5)
ALP SERPL-CCNC: 96 U/L — SIGNIFICANT CHANGE UP (ref 40–120)
ALP SERPL-CCNC: 96 U/L — SIGNIFICANT CHANGE UP (ref 40–120)
ALT FLD-CCNC: 18 U/L — SIGNIFICANT CHANGE UP (ref 12–78)
ALT FLD-CCNC: 18 U/L — SIGNIFICANT CHANGE UP (ref 12–78)
ANION GAP SERPL CALC-SCNC: 8 MMOL/L — SIGNIFICANT CHANGE UP (ref 5–17)
ANION GAP SERPL CALC-SCNC: 8 MMOL/L — SIGNIFICANT CHANGE UP (ref 5–17)
AST SERPL-CCNC: 12 U/L — LOW (ref 15–37)
AST SERPL-CCNC: 12 U/L — LOW (ref 15–37)
BASOPHILS # BLD AUTO: 0.07 K/UL — SIGNIFICANT CHANGE UP (ref 0–0.2)
BASOPHILS # BLD AUTO: 0.07 K/UL — SIGNIFICANT CHANGE UP (ref 0–0.2)
BASOPHILS NFR BLD AUTO: 0.6 % — SIGNIFICANT CHANGE UP (ref 0–2)
BASOPHILS NFR BLD AUTO: 0.6 % — SIGNIFICANT CHANGE UP (ref 0–2)
BILIRUB SERPL-MCNC: 0.6 MG/DL — SIGNIFICANT CHANGE UP (ref 0.2–1.2)
BILIRUB SERPL-MCNC: 0.6 MG/DL — SIGNIFICANT CHANGE UP (ref 0.2–1.2)
BUN SERPL-MCNC: 9 MG/DL — SIGNIFICANT CHANGE UP (ref 7–23)
BUN SERPL-MCNC: 9 MG/DL — SIGNIFICANT CHANGE UP (ref 7–23)
CALCIUM SERPL-MCNC: 8.5 MG/DL — SIGNIFICANT CHANGE UP (ref 8.5–10.1)
CALCIUM SERPL-MCNC: 8.5 MG/DL — SIGNIFICANT CHANGE UP (ref 8.5–10.1)
CHLORIDE SERPL-SCNC: 100 MMOL/L — SIGNIFICANT CHANGE UP (ref 96–108)
CHLORIDE SERPL-SCNC: 100 MMOL/L — SIGNIFICANT CHANGE UP (ref 96–108)
CO2 SERPL-SCNC: 28 MMOL/L — SIGNIFICANT CHANGE UP (ref 22–31)
CO2 SERPL-SCNC: 28 MMOL/L — SIGNIFICANT CHANGE UP (ref 22–31)
CREAT SERPL-MCNC: 0.59 MG/DL — SIGNIFICANT CHANGE UP (ref 0.5–1.3)
CREAT SERPL-MCNC: 0.59 MG/DL — SIGNIFICANT CHANGE UP (ref 0.5–1.3)
CRP SERPL-MCNC: 114 MG/L — HIGH
CRP SERPL-MCNC: 114 MG/L — HIGH
EGFR: 100 ML/MIN/1.73M2 — SIGNIFICANT CHANGE UP
EGFR: 100 ML/MIN/1.73M2 — SIGNIFICANT CHANGE UP
EOSINOPHIL # BLD AUTO: 0.07 K/UL — SIGNIFICANT CHANGE UP (ref 0–0.5)
EOSINOPHIL # BLD AUTO: 0.07 K/UL — SIGNIFICANT CHANGE UP (ref 0–0.5)
EOSINOPHIL NFR BLD AUTO: 0.6 % — SIGNIFICANT CHANGE UP (ref 0–6)
EOSINOPHIL NFR BLD AUTO: 0.6 % — SIGNIFICANT CHANGE UP (ref 0–6)
ESTIMATED AVERAGE GLUCOSE: 286 MG/DL — HIGH (ref 68–114)
ESTIMATED AVERAGE GLUCOSE: 286 MG/DL — HIGH (ref 68–114)
GLUCOSE BLDC GLUCOMTR-MCNC: 148 MG/DL — HIGH (ref 70–99)
GLUCOSE BLDC GLUCOMTR-MCNC: 148 MG/DL — HIGH (ref 70–99)
GLUCOSE BLDC GLUCOMTR-MCNC: 177 MG/DL — HIGH (ref 70–99)
GLUCOSE BLDC GLUCOMTR-MCNC: 177 MG/DL — HIGH (ref 70–99)
GLUCOSE BLDC GLUCOMTR-MCNC: 208 MG/DL — HIGH (ref 70–99)
GLUCOSE BLDC GLUCOMTR-MCNC: 208 MG/DL — HIGH (ref 70–99)
GLUCOSE BLDC GLUCOMTR-MCNC: 225 MG/DL — HIGH (ref 70–99)
GLUCOSE BLDC GLUCOMTR-MCNC: 225 MG/DL — HIGH (ref 70–99)
GLUCOSE SERPL-MCNC: 246 MG/DL — HIGH (ref 70–99)
GLUCOSE SERPL-MCNC: 246 MG/DL — HIGH (ref 70–99)
GRAM STN FLD: ABNORMAL
HCT VFR BLD CALC: 32.8 % — LOW (ref 34.5–45)
HCT VFR BLD CALC: 32.8 % — LOW (ref 34.5–45)
HCV AB S/CO SERPL IA: 0.08 S/CO — SIGNIFICANT CHANGE UP (ref 0–0.99)
HCV AB S/CO SERPL IA: 0.08 S/CO — SIGNIFICANT CHANGE UP (ref 0–0.99)
HCV AB SERPL-IMP: SIGNIFICANT CHANGE UP
HCV AB SERPL-IMP: SIGNIFICANT CHANGE UP
HGB BLD-MCNC: 11.1 G/DL — LOW (ref 11.5–15.5)
HGB BLD-MCNC: 11.1 G/DL — LOW (ref 11.5–15.5)
IMM GRANULOCYTES NFR BLD AUTO: 0.7 % — SIGNIFICANT CHANGE UP (ref 0–0.9)
IMM GRANULOCYTES NFR BLD AUTO: 0.7 % — SIGNIFICANT CHANGE UP (ref 0–0.9)
LYMPHOCYTES # BLD AUTO: 1.63 K/UL — SIGNIFICANT CHANGE UP (ref 1–3.3)
LYMPHOCYTES # BLD AUTO: 1.63 K/UL — SIGNIFICANT CHANGE UP (ref 1–3.3)
LYMPHOCYTES # BLD AUTO: 13.1 % — SIGNIFICANT CHANGE UP (ref 13–44)
LYMPHOCYTES # BLD AUTO: 13.1 % — SIGNIFICANT CHANGE UP (ref 13–44)
MCHC RBC-ENTMCNC: 29.5 PG — SIGNIFICANT CHANGE UP (ref 27–34)
MCHC RBC-ENTMCNC: 29.5 PG — SIGNIFICANT CHANGE UP (ref 27–34)
MCHC RBC-ENTMCNC: 33.8 GM/DL — SIGNIFICANT CHANGE UP (ref 32–36)
MCHC RBC-ENTMCNC: 33.8 GM/DL — SIGNIFICANT CHANGE UP (ref 32–36)
MCV RBC AUTO: 87.2 FL — SIGNIFICANT CHANGE UP (ref 80–100)
MCV RBC AUTO: 87.2 FL — SIGNIFICANT CHANGE UP (ref 80–100)
METHOD TYPE: SIGNIFICANT CHANGE UP
METHOD TYPE: SIGNIFICANT CHANGE UP
MONOCYTES # BLD AUTO: 1.01 K/UL — HIGH (ref 0–0.9)
MONOCYTES # BLD AUTO: 1.01 K/UL — HIGH (ref 0–0.9)
MONOCYTES NFR BLD AUTO: 8.1 % — SIGNIFICANT CHANGE UP (ref 2–14)
MONOCYTES NFR BLD AUTO: 8.1 % — SIGNIFICANT CHANGE UP (ref 2–14)
NEUTROPHILS # BLD AUTO: 9.6 K/UL — HIGH (ref 1.8–7.4)
NEUTROPHILS # BLD AUTO: 9.6 K/UL — HIGH (ref 1.8–7.4)
NEUTROPHILS NFR BLD AUTO: 76.9 % — SIGNIFICANT CHANGE UP (ref 43–77)
NEUTROPHILS NFR BLD AUTO: 76.9 % — SIGNIFICANT CHANGE UP (ref 43–77)
PLATELET # BLD AUTO: 249 K/UL — SIGNIFICANT CHANGE UP (ref 150–400)
PLATELET # BLD AUTO: 249 K/UL — SIGNIFICANT CHANGE UP (ref 150–400)
POTASSIUM SERPL-MCNC: 3.4 MMOL/L — LOW (ref 3.5–5.3)
POTASSIUM SERPL-MCNC: 3.4 MMOL/L — LOW (ref 3.5–5.3)
POTASSIUM SERPL-SCNC: 3.4 MMOL/L — LOW (ref 3.5–5.3)
POTASSIUM SERPL-SCNC: 3.4 MMOL/L — LOW (ref 3.5–5.3)
PROT SERPL-MCNC: 5.9 GM/DL — LOW (ref 6–8.3)
PROT SERPL-MCNC: 5.9 GM/DL — LOW (ref 6–8.3)
RBC # BLD: 3.76 M/UL — LOW (ref 3.8–5.2)
RBC # BLD: 3.76 M/UL — LOW (ref 3.8–5.2)
RBC # FLD: 12.3 % — SIGNIFICANT CHANGE UP (ref 10.3–14.5)
RBC # FLD: 12.3 % — SIGNIFICANT CHANGE UP (ref 10.3–14.5)
SODIUM SERPL-SCNC: 136 MMOL/L — SIGNIFICANT CHANGE UP (ref 135–145)
SODIUM SERPL-SCNC: 136 MMOL/L — SIGNIFICANT CHANGE UP (ref 135–145)
SPECIMEN SOURCE: SIGNIFICANT CHANGE UP
VANCOMYCIN TROUGH SERPL-MCNC: 11.6 UG/ML — SIGNIFICANT CHANGE UP (ref 10–20)
VANCOMYCIN TROUGH SERPL-MCNC: 11.6 UG/ML — SIGNIFICANT CHANGE UP (ref 10–20)
WBC # BLD: 12.47 K/UL — HIGH (ref 3.8–10.5)
WBC # BLD: 12.47 K/UL — HIGH (ref 3.8–10.5)
WBC # FLD AUTO: 12.47 K/UL — HIGH (ref 3.8–10.5)
WBC # FLD AUTO: 12.47 K/UL — HIGH (ref 3.8–10.5)

## 2023-11-01 PROCEDURE — 99233 SBSQ HOSP IP/OBS HIGH 50: CPT

## 2023-11-01 PROCEDURE — 93925 LOWER EXTREMITY STUDY: CPT | Mod: 26

## 2023-11-01 PROCEDURE — 73620 X-RAY EXAM OF FOOT: CPT | Mod: 26,RT

## 2023-11-01 PROCEDURE — 88304 TISSUE EXAM BY PATHOLOGIST: CPT | Mod: 26

## 2023-11-01 PROCEDURE — 88311 DECALCIFY TISSUE: CPT | Mod: 26

## 2023-11-01 PROCEDURE — 93306 TTE W/DOPPLER COMPLETE: CPT | Mod: 26

## 2023-11-01 PROCEDURE — 88305 TISSUE EXAM BY PATHOLOGIST: CPT | Mod: 26

## 2023-11-01 RX ORDER — ONDANSETRON 8 MG/1
4 TABLET, FILM COATED ORAL ONCE
Refills: 0 | Status: DISCONTINUED | OUTPATIENT
Start: 2023-11-01 | End: 2023-11-01

## 2023-11-01 RX ORDER — POTASSIUM CHLORIDE 20 MEQ
40 PACKET (EA) ORAL ONCE
Refills: 0 | Status: COMPLETED | OUTPATIENT
Start: 2023-11-01 | End: 2023-11-01

## 2023-11-01 RX ORDER — FENTANYL CITRATE 50 UG/ML
25 INJECTION INTRAVENOUS
Refills: 0 | Status: DISCONTINUED | OUTPATIENT
Start: 2023-11-01 | End: 2023-11-01

## 2023-11-01 RX ORDER — SODIUM CHLORIDE 9 MG/ML
3 INJECTION INTRAMUSCULAR; INTRAVENOUS; SUBCUTANEOUS EVERY 8 HOURS
Refills: 0 | Status: DISCONTINUED | OUTPATIENT
Start: 2023-11-01 | End: 2023-11-09

## 2023-11-01 RX ORDER — SODIUM CHLORIDE 9 MG/ML
1000 INJECTION INTRAMUSCULAR; INTRAVENOUS; SUBCUTANEOUS
Refills: 0 | Status: DISCONTINUED | OUTPATIENT
Start: 2023-11-01 | End: 2023-11-01

## 2023-11-01 RX ORDER — CEFTRIAXONE 500 MG/1
2000 INJECTION, POWDER, FOR SOLUTION INTRAMUSCULAR; INTRAVENOUS EVERY 24 HOURS
Refills: 0 | Status: DISCONTINUED | OUTPATIENT
Start: 2023-11-01 | End: 2023-11-09

## 2023-11-01 RX ORDER — ACETAMINOPHEN 500 MG
1000 TABLET ORAL ONCE
Refills: 0 | Status: DISCONTINUED | OUTPATIENT
Start: 2023-11-01 | End: 2023-11-01

## 2023-11-01 RX ORDER — VANCOMYCIN HCL 1 G
1000 VIAL (EA) INTRAVENOUS EVERY 12 HOURS
Refills: 0 | Status: DISCONTINUED | OUTPATIENT
Start: 2023-11-01 | End: 2023-11-03

## 2023-11-01 RX ADMIN — CEFTRIAXONE 2000 MILLIGRAM(S): 500 INJECTION, POWDER, FOR SOLUTION INTRAMUSCULAR; INTRAVENOUS at 10:24

## 2023-11-01 RX ADMIN — Medication 4: at 08:24

## 2023-11-01 RX ADMIN — Medication 250 MILLIGRAM(S): at 21:28

## 2023-11-01 RX ADMIN — Medication 2: at 11:36

## 2023-11-01 RX ADMIN — Medication 250 MILLIGRAM(S): at 10:30

## 2023-11-01 RX ADMIN — PIPERACILLIN AND TAZOBACTAM 25 GRAM(S): 4; .5 INJECTION, POWDER, LYOPHILIZED, FOR SOLUTION INTRAVENOUS at 05:44

## 2023-11-01 RX ADMIN — Medication 40 MILLIEQUIVALENT(S): at 14:46

## 2023-11-01 RX ADMIN — SODIUM CHLORIDE 125 MILLILITER(S): 9 INJECTION INTRAMUSCULAR; INTRAVENOUS; SUBCUTANEOUS at 18:23

## 2023-11-01 RX ADMIN — SODIUM CHLORIDE 3 MILLILITER(S): 9 INJECTION INTRAMUSCULAR; INTRAVENOUS; SUBCUTANEOUS at 14:42

## 2023-11-01 RX ADMIN — INSULIN GLARGINE 8 UNIT(S): 100 INJECTION, SOLUTION SUBCUTANEOUS at 21:29

## 2023-11-01 RX ADMIN — SODIUM CHLORIDE 125 MILLILITER(S): 9 INJECTION INTRAMUSCULAR; INTRAVENOUS; SUBCUTANEOUS at 05:52

## 2023-11-01 RX ADMIN — SODIUM CHLORIDE 3 MILLILITER(S): 9 INJECTION INTRAMUSCULAR; INTRAVENOUS; SUBCUTANEOUS at 21:29

## 2023-11-01 NOTE — CONSULT NOTE ADULT - SUBJECTIVE AND OBJECTIVE BOX
CHIEF COMPLAINT: Patient is a 63y old  Female who presents with a chief complaint of foot ulcer (01 Nov 2023 09:43)    FROM H&P: 63F w/no sig PMH, presents c/o infection of Right first toe w/ malodorous drainage occurring over past several weeks. She's been sent in by her podiatrist for IV abx for osteomyelitis. Pt denies any pain, fever/chills, n/v/d, abd pain, dysuria, cp, sob.  Pt states this started after getting a pedicure. She has not been to any doctor for many years.   -- In ED, given zosyn, vanco, seen by podiatry (note reviewed), gluc 325, Na 129, temp 99, HR 100s, sbp 140s  MRI Rt foot 1.  Extensive osseous destruction of the 1st toe is again seen consistent with osteomyelitis. 2.  Faint marrow edema of the 1st metatarsal head and hallux sesamoids   may be reactive in the absence of confluent T1 marrow replacement, however, early infection cannot be absolutely excluded.  -- LE doppler b/l NEG for dvt    11/1. Denies hx of vascular intervention  Warm to touch extremities. Plan for arterial doppler      PAST MEDICAL & SURGICAL HISTORY:  no sig PMH/PSH    FAMILY HISTORY:  denies smoking/etoh/drug use  lives alone, ind ADLs    FAMILY HISTORY:  pt denies     MEDICATIONS  (STANDING):  dextrose 5%. 1000 milliLiter(s) (100 mL/Hr) IV Continuous <Continuous>  dextrose 5%. 1000 milliLiter(s) (50 mL/Hr) IV Continuous <Continuous>  dextrose 50% Injectable 12.5 Gram(s) IV Push once  dextrose 50% Injectable 25 Gram(s) IV Push once  dextrose 50% Injectable 25 Gram(s) IV Push once  glucagon  Injectable 1 milliGRAM(s) IntraMuscular once  insulin glargine Injectable (LANTUS) 8 Unit(s) SubCutaneous at bedtime  insulin lispro (ADMELOG) corrective regimen sliding scale   SubCutaneous at bedtime  insulin lispro (ADMELOG) corrective regimen sliding scale   SubCutaneous three times a day before meals  piperacillin/tazobactam IVPB.. 3.375 Gram(s) IV Intermittent once    MEDICATIONS  (PRN):  acetaminophen     Tablet .. 650 milliGRAM(s) Oral every 6 hours PRN Temp greater or equal to 38C (100.4F), Mild Pain (1 - 3)  aluminum hydroxide/magnesium hydroxide/simethicone Suspension 30 milliLiter(s) Oral every 4 hours PRN Dyspepsia  dextrose Oral Gel 15 Gram(s) Oral once PRN Blood Glucose LESS THAN 70 milliGRAM(s)/deciliter  melatonin 3 milliGRAM(s) Oral at bedtime PRN Insomnia  ondansetron Injectable 4 milliGRAM(s) IV Push every 8 hours PRN Nausea and/or Vomiting    PHYSICAL EXAM:  Vital Signs Last 24 Hrs  T(C): 36.9 (01 Nov 2023 07:40), Max: 37.7 (31 Oct 2023 21:24)  T(F): 98.5 (01 Nov 2023 07:40), Max: 99.8 (31 Oct 2023 21:24)  HR: 86 (01 Nov 2023 07:40) (86 - 104)  BP: 122/57 (01 Nov 2023 07:40) (118/75 - 144/69)  BP(mean): 78 (31 Oct 2023 21:24) (78 - 88)  RR: 18 (01 Nov 2023 07:40) (18 - 18)  SpO2: 92% (01 Nov 2023 07:40) (92% - 98%)    Parameters below as of 01 Nov 2023 07:40  Patient On (Oxygen Delivery Method): room air    Constitutional: NAD, awake and alert  HEENT: PERR, EOMI, Normal Hearing, MMM  Neck: Soft and supple, No LAD, No JVD  Respiratory: Breath sounds are clear bilaterally, No wheezing, rales or rhonchi  Cardiovascular: S1 and S2, regular rate and rhythm, no Murmurs, gallops or rubs  Gastrointestinal: Bowel Sounds present, soft, nontender, nondistended, no guarding, no rebound  Extremities: No peripheral edema  Vascular: 2+ peripheral pulses  Neurological: A/O x 3, no focal deficits  Musculoskeletal: 5/5 strength b/l upper and lower extremities  Skin: RT foot ulcers/erythema     =======================================    LABS:                        11.1   12.47 )-----------( 249      ( 01 Nov 2023 08:04 )             32.8     11-01    136  |  100  |  9   ----------------------------<  246<H>  3.4<L>   |  28  |  0.59    Ca    8.5      01 Nov 2023 08:04    TPro  5.9<L>  /  Alb  1.9<L>  /  TBili  0.6  /  DBili  x   /  AST  12<L>  /  ALT  18  /  AlkPhos  96  11-01    PT/INR - ( 31 Oct 2023 15:27 )   PT: 13.7 sec;   INR: 1.22 ratio       PTT - ( 31 Oct 2023 15:27 )  PTT:31.7 sec      RADIOLOGY & ADDITIONAL STUDIES:    < from: MR Foot w/wo IV Cont, Right (10.31.23 @ 17:48) >  1.  Extensive osseous destruction of the 1st toe is again seen consistent   with osteomyelitis.  2.  Faint marrow edema of the 1st metatarsal head and hallux sesamoids   may be reactive in the absence of confluent T1 marrow replacement,   however, early infection cannot be absolutely excluded.    < end of copied text >

## 2023-11-01 NOTE — CONSULT NOTE ADULT - ASSESSMENT
64 y/o Female with h/o nonhealing foot ulcer was admitted on 10/31 for infection of right first toe w/ malodorous drainage occurring over past several weeks. She's been sent in by her podiatrist for IV abx for osteomyelitis. Pt denies pain, fever/chills. Pt states this started after getting a pedicure. She has not been to any doctor for many years. In ED, she received zosyn, vanco IV. Overnight she is reported with bacterermia.    1. Bacteremia / Sepsis with GPC in pairs. Right great toe acute OM with surrounding right foot cellulitis.  -leukocytosis  -cultures reviewed  -start vancomycin 1 gm IV q12h and ceftriaxone 2 gm IV qd  -reason for abx use and side effects reviewed with patient; monitor BMP and vancomycin trough levels  -local foot care  -podiatry evaluation appreciated - plan for surgery  -repeat BC x 2 in AM  -old chart reviewed to assess prior cultures  -monitor temps  -f/u CBC  -supportive care  2. Other issues:   -care per medicine       64 y/o Female with h/o nonhealing foot ulcer was admitted on 10/31 for infection of right first toe w/ malodorous drainage occurring over past several weeks. She's been sent in by her podiatrist for IV abx for osteomyelitis. Pt denies pain, fever/chills. Pt states this started after getting a pedicure. She has not been to any doctor for many years. In ED, she received zosyn, vanco IV. Overnight she is reported with bacterermia.    1. Bacteremia / Sepsis with GPC in pairs. Right great toe acute OM with surrounding right foot cellulitis. RLE cellulitis. Probable undiagnosed DM type 2.  -leukocytosis  -cultures reviewed  -start vancomycin 1 gm IV q12h and ceftriaxone 2 gm IV qd  -reason for abx use and side effects reviewed with patient; monitor BMP and vancomycin trough levels  -local foot care  -podiatry evaluation appreciated - plan for surgery  -repeat BC x 2 in AM  -cardiac Echo  -old chart reviewed to assess prior cultures  -monitor temps  -f/u CBC  -supportive care  2. Other issues:   -care per medicine

## 2023-11-01 NOTE — BRIEF OPERATIVE NOTE - OPERATION/FINDINGS
XS white pus [id number] from plantar sinus tract plantar medial right 1st Toe alix necrotic proximal, distal phalanges, sesamoids and distal 1st metatarsal

## 2023-11-01 NOTE — CONSULT NOTE ADULT - ASSESSMENT
63F w/no sig PMH, presents c/o infection of Right first toe w/ malodorous drainage occurring over past several weeks. She's been sent in by her podiatrist for IV abx for osteomyelitis. Pt denies any pain, fever/chills, n/v/d, abd pain, dysuria, cp, sob.  Pt states this started after getting a pedicure. She has not been to any doctor for many years. In ED, given zosyn, vanco, seen by podiatry (note reviewed), gluc 325, Na 129, temp 99, HR 100s, sbp 140s. MRI Rt foot 1.  Extensive osseous destruction of the 1st toe is again seen consistent with osteomyelitis. 2.  Faint marrow edema of the 1st metatarsal head and hallux sesamoids may be reactive in the absence of confluent T1 marrow replacement, however, early infection cannot be absolutely excluded. LE doppler b/l NEG for dvt    #Bacteremia / Sepsis   #Right great toe acute OM with surrounding right foot cellulitis.   #R/o PAD  - Plan for arterial doppler for further evaluation  - Podiatry and ID following  - On ABX per ID.          Case d/w Dr. kern

## 2023-11-01 NOTE — CONSULT NOTE ADULT - SUBJECTIVE AND OBJECTIVE BOX
Patient is a 63y old  Female who presents with a chief complaint of foot ulcer     HPI:  62 y/o Female with h/o nonhealing foot ulcer was admitted on 10/31 for infection of right first toe w/ malodorous drainage occurring over past several weeks. She's been sent in by her podiatrist for IV abx for osteomyelitis. Pt denies pain, fever/chills. Pt states this started after getting a pedicure. She has not been to any doctor for many years. In ED, she received zosyn, vanco IV. Overnight she is reported with bacterermia.     PMH: as above  PSH: as above  Meds: per reconciliation sheet, noted below  MEDICATIONS  (STANDING):  dextrose 5%. 1000 milliLiter(s) (50 mL/Hr) IV Continuous <Continuous>  dextrose 5%. 1000 milliLiter(s) (100 mL/Hr) IV Continuous <Continuous>  dextrose 50% Injectable 12.5 Gram(s) IV Push once  dextrose 50% Injectable 25 Gram(s) IV Push once  dextrose 50% Injectable 25 Gram(s) IV Push once  glucagon  Injectable 1 milliGRAM(s) IntraMuscular once  insulin glargine Injectable (LANTUS) 8 Unit(s) SubCutaneous at bedtime  insulin lispro (ADMELOG) corrective regimen sliding scale   SubCutaneous at bedtime  insulin lispro (ADMELOG) corrective regimen sliding scale   SubCutaneous three times a day before meals  piperacillin/tazobactam IVPB.. 3.375 Gram(s) IV Intermittent every 8 hours  sodium chloride 0.9% lock flush 3 milliLiter(s) IV Push every 8 hours  sodium chloride 0.9%. 1000 milliLiter(s) (125 mL/Hr) IV Continuous <Continuous>  vancomycin  IVPB 1500 milliGRAM(s) IV Intermittent every 12 hours    MEDICATIONS  (PRN):  acetaminophen     Tablet .. 650 milliGRAM(s) Oral every 6 hours PRN Temp greater or equal to 38C (100.4F), Mild Pain (1 - 3)  aluminum hydroxide/magnesium hydroxide/simethicone Suspension 30 milliLiter(s) Oral every 4 hours PRN Dyspepsia  dextrose Oral Gel 15 Gram(s) Oral once PRN Blood Glucose LESS THAN 70 milliGRAM(s)/deciliter  melatonin 3 milliGRAM(s) Oral at bedtime PRN Insomnia  ondansetron Injectable 4 milliGRAM(s) IV Push every 8 hours PRN Nausea and/or Vomiting    Allergies    No Known Allergies    Intolerances      Social: no smoking, no alcohol, no illegal drugs; no recent travel, no exposure to TB  FAMILY HISTORY:    no history of premature cardiovascular disease in first degree relatives    ROS: the patient denies fever, no chills, no HA, no seizures, no dizziness, no sore throat, no nasal congestion, no blurry vision, no CP, no palpitations, no SOB, no cough, no abdominal pain, no diarrhea, no N/V, no dysuria, no leg pain, no claudication, right great toe and rash, no joint aches, no rectal pain or bleeding, no night sweats  All other systems reviewed and are negative    Vital Signs Last 24 Hrs  T(C): 36.9 (01 Nov 2023 07:40), Max: 37.7 (31 Oct 2023 21:24)  T(F): 98.5 (01 Nov 2023 07:40), Max: 99.8 (31 Oct 2023 21:24)  HR: 86 (01 Nov 2023 07:40) (86 - 104)  BP: 122/57 (01 Nov 2023 07:40) (118/75 - 144/69)  BP(mean): 78 (31 Oct 2023 21:24) (78 - 88)  RR: 18 (01 Nov 2023 07:40) (18 - 18)  SpO2: 92% (01 Nov 2023 07:40) (92% - 98%)    Parameters below as of 01 Nov 2023 07:40  Patient On (Oxygen Delivery Method): room air      Daily Height in cm: 182.88 (31 Oct 2023 13:39)    Daily     PE:    Constitutional:  No acute distress  HEENT: NC/AT, EOMI, PERRLA, conjunctivae clear; ears and nose atraumatic; pharynx benign  Neck: supple; thyroid not palpable  Back: no tenderness  Respiratory: respiratory effort normal; clear to auscultation  Cardiovascular: S1S2 regular, no murmurs  Abdomen: soft, not tender, not distended, positive BS; no liver or spleen organomegaly  Genitourinary: no suprapubic tenderness  Lymphatic: no LN palpable  Musculoskeletal: no muscle tenderness, no joint swelling or tenderness  Extremities: no pedal edema  Right great toe ulcer and surrounding erythema, edema and tenderness  Neurological/ Psychiatric: AxOx3, judgement and insight normal; moving all extremities  Skin: no rashes; no palpable lesions    Labs: all available labs reviewed                        11.1   12.47 )-----------( 249      ( 01 Nov 2023 08:04 )             32.8     11-01    136  |  100  |  9   ----------------------------<  246<H>  3.4<L>   |  28  |  0.59    Ca    8.5      01 Nov 2023 08:04    TPro  5.9<L>  /  Alb  1.9<L>  /  TBili  0.6  /  DBili  x   /  AST  12<L>  /  ALT  18  /  AlkPhos  96  11-01     LIVER FUNCTIONS - ( 01 Nov 2023 08:04 )  Alb: 1.9 g/dL / Pro: 5.9 gm/dL / ALK PHOS: 96 U/L / ALT: 18 U/L / AST: 12 U/L / GGT: x           Culture - Blood (collected 31 Oct 2023 15:39)  Source: .Blood None  Gram Stain (01 Nov 2023 09:19):    Growth in aerobic bottle: Gram positive cocci in pairs  Preliminary Report (01 Nov 2023 09:19):    Growth in aerobic bottle: Gram positive cocci in pairs    Direct identification is available within approximately 3-5    hours either by Blood Panel Multiplexed PCR or Direct    MALDI-TOF. Details: [email or web address]    Radiology: all available radiological tests reviewed    < from: MR Foot w/wo IV Cont, Right (10.31.23 @ 17:48) >  1.  Extensive osseous destruction of the 1st toe is again seen consistent   with osteomyelitis.  2.  Faint marrow edema of the 1st metatarsal head and hallux sesamoids   may be reactive in the absence of confluent T1 marrow replacement,   however, early infection cannot be absolutely excluded.    < end of copied text >      Advanced directives addressed: full resuscitation Patient is a 63y old  Female who presents with a chief complaint of foot ulcer     HPI:  62 y/o Female with h/o nonhealing foot ulcer was admitted on 10/31 for infection of right first toe w/ malodorous drainage occurring over past several weeks. She's been sent in by her podiatrist for IV abx for osteomyelitis. Pt denies pain, fever/chills. Pt states this started after getting a pedicure. She has not been to any doctor for many years. In ED, she received zosyn, vanco IV. Overnight she is reported with bacterermia.     PMH: as above  PSH: as above  Meds: per reconciliation sheet, noted below  MEDICATIONS  (STANDING):  dextrose 5%. 1000 milliLiter(s) (50 mL/Hr) IV Continuous <Continuous>  dextrose 5%. 1000 milliLiter(s) (100 mL/Hr) IV Continuous <Continuous>  dextrose 50% Injectable 12.5 Gram(s) IV Push once  dextrose 50% Injectable 25 Gram(s) IV Push once  dextrose 50% Injectable 25 Gram(s) IV Push once  glucagon  Injectable 1 milliGRAM(s) IntraMuscular once  insulin glargine Injectable (LANTUS) 8 Unit(s) SubCutaneous at bedtime  insulin lispro (ADMELOG) corrective regimen sliding scale   SubCutaneous at bedtime  insulin lispro (ADMELOG) corrective regimen sliding scale   SubCutaneous three times a day before meals  piperacillin/tazobactam IVPB.. 3.375 Gram(s) IV Intermittent every 8 hours  sodium chloride 0.9% lock flush 3 milliLiter(s) IV Push every 8 hours  sodium chloride 0.9%. 1000 milliLiter(s) (125 mL/Hr) IV Continuous <Continuous>  vancomycin  IVPB 1500 milliGRAM(s) IV Intermittent every 12 hours    MEDICATIONS  (PRN):  acetaminophen     Tablet .. 650 milliGRAM(s) Oral every 6 hours PRN Temp greater or equal to 38C (100.4F), Mild Pain (1 - 3)  aluminum hydroxide/magnesium hydroxide/simethicone Suspension 30 milliLiter(s) Oral every 4 hours PRN Dyspepsia  dextrose Oral Gel 15 Gram(s) Oral once PRN Blood Glucose LESS THAN 70 milliGRAM(s)/deciliter  melatonin 3 milliGRAM(s) Oral at bedtime PRN Insomnia  ondansetron Injectable 4 milliGRAM(s) IV Push every 8 hours PRN Nausea and/or Vomiting    Allergies    No Known Allergies    Intolerances      Social: no smoking, no alcohol, no illegal drugs; no recent travel, no exposure to TB  FAMILY HISTORY:    no history of premature cardiovascular disease in first degree relatives    ROS: the patient denies fever, no chills, no HA, no seizures, no dizziness, no sore throat, no nasal congestion, no blurry vision, no CP, no palpitations, no SOB, no cough, no abdominal pain, no diarrhea, no N/V, no dysuria, no leg pain, no claudication, right great toe and rash, no joint aches, no rectal pain or bleeding, no night sweats  All other systems reviewed and are negative    Vital Signs Last 24 Hrs  T(C): 36.9 (01 Nov 2023 07:40), Max: 37.7 (31 Oct 2023 21:24)  T(F): 98.5 (01 Nov 2023 07:40), Max: 99.8 (31 Oct 2023 21:24)  HR: 86 (01 Nov 2023 07:40) (86 - 104)  BP: 122/57 (01 Nov 2023 07:40) (118/75 - 144/69)  BP(mean): 78 (31 Oct 2023 21:24) (78 - 88)  RR: 18 (01 Nov 2023 07:40) (18 - 18)  SpO2: 92% (01 Nov 2023 07:40) (92% - 98%)    Parameters below as of 01 Nov 2023 07:40  Patient On (Oxygen Delivery Method): room air      Daily Height in cm: 182.88 (31 Oct 2023 13:39)    Daily     PE:    Constitutional:  No acute distress  HEENT: NC/AT, EOMI, PERRLA, conjunctivae clear; ears and nose atraumatic; pharynx benign  Neck: supple; thyroid not palpable  Back: no tenderness  Respiratory: respiratory effort normal; clear to auscultation  Cardiovascular: S1S2 regular, no murmurs  Abdomen: soft, not tender, not distended, positive BS; no liver or spleen organomegaly  Genitourinary: no suprapubic tenderness  Lymphatic: no LN palpable  Musculoskeletal: no muscle tenderness, no joint swelling or tenderness  Extremities: 2+ pedal edema  Right great toe ulcer and surrounding erythema, edema and tenderness  Right foot, right ankle, shin and calf erythema, edema and tenderness  Neurological/ Psychiatric: AxOx3, judgement and insight normal; moving all extremities  Skin: no rashes; no palpable lesions    Labs: all available labs reviewed                        11.1 12.47 )-----------( 249      ( 01 Nov 2023 08:04 )             32.8     11-01    136  |  100  |  9   ----------------------------<  246<H>  3.4<L>   |  28  |  0.59    Ca    8.5      01 Nov 2023 08:04    TPro  5.9<L>  /  Alb  1.9<L>  /  TBili  0.6  /  DBili  x   /  AST  12<L>  /  ALT  18  /  AlkPhos  96  11-01     LIVER FUNCTIONS - ( 01 Nov 2023 08:04 )  Alb: 1.9 g/dL / Pro: 5.9 gm/dL / ALK PHOS: 96 U/L / ALT: 18 U/L / AST: 12 U/L / GGT: x           Culture - Blood (collected 31 Oct 2023 15:39)  Source: .Blood None  Gram Stain (01 Nov 2023 09:19):    Growth in aerobic bottle: Gram positive cocci in pairs  Preliminary Report (01 Nov 2023 09:19):    Growth in aerobic bottle: Gram positive cocci in pairs    Direct identification is available within approximately 3-5    hours either by Blood Panel Multiplexed PCR or Direct    MALDI-TOF. Details: [email or web address]    Radiology: all available radiological tests reviewed    < from: MR Foot w/wo IV Cont, Right (10.31.23 @ 17:48) >  1.  Extensive osseous destruction of the 1st toe is again seen consistent   with osteomyelitis.  2.  Faint marrow edema of the 1st metatarsal head and hallux sesamoids   may be reactive in the absence of confluent T1 marrow replacement,   however, early infection cannot be absolutely excluded.    < end of copied text >      Advanced directives addressed: full resuscitation

## 2023-11-02 LAB
CULTURE RESULTS: ABNORMAL
GLUCOSE BLDC GLUCOMTR-MCNC: 176 MG/DL — HIGH (ref 70–99)
GLUCOSE BLDC GLUCOMTR-MCNC: 176 MG/DL — HIGH (ref 70–99)
GLUCOSE BLDC GLUCOMTR-MCNC: 220 MG/DL — HIGH (ref 70–99)
GLUCOSE BLDC GLUCOMTR-MCNC: 220 MG/DL — HIGH (ref 70–99)
GLUCOSE BLDC GLUCOMTR-MCNC: 222 MG/DL — HIGH (ref 70–99)
GLUCOSE BLDC GLUCOMTR-MCNC: 222 MG/DL — HIGH (ref 70–99)
GLUCOSE BLDC GLUCOMTR-MCNC: 281 MG/DL — HIGH (ref 70–99)
GLUCOSE BLDC GLUCOMTR-MCNC: 281 MG/DL — HIGH (ref 70–99)
NIGHT BLUE STAIN TISS: SIGNIFICANT CHANGE UP
NIGHT BLUE STAIN TISS: SIGNIFICANT CHANGE UP
ORGANISM # SPEC MICROSCOPIC CNT: ABNORMAL
ORGANISM # SPEC MICROSCOPIC CNT: ABNORMAL
ORGANISM # SPEC MICROSCOPIC CNT: SIGNIFICANT CHANGE UP
ORGANISM # SPEC MICROSCOPIC CNT: SIGNIFICANT CHANGE UP
SPECIMEN SOURCE: SIGNIFICANT CHANGE UP
VANCOMYCIN TROUGH SERPL-MCNC: 9.7 UG/ML — LOW (ref 10–20)
VANCOMYCIN TROUGH SERPL-MCNC: 9.7 UG/ML — LOW (ref 10–20)

## 2023-11-02 PROCEDURE — 99232 SBSQ HOSP IP/OBS MODERATE 35: CPT

## 2023-11-02 RX ORDER — INSULIN GLARGINE 100 [IU]/ML
12 INJECTION, SOLUTION SUBCUTANEOUS AT BEDTIME
Refills: 0 | Status: DISCONTINUED | OUTPATIENT
Start: 2023-11-02 | End: 2023-11-03

## 2023-11-02 RX ADMIN — SODIUM CHLORIDE 3 MILLILITER(S): 9 INJECTION INTRAMUSCULAR; INTRAVENOUS; SUBCUTANEOUS at 13:07

## 2023-11-02 RX ADMIN — CEFTRIAXONE 2000 MILLIGRAM(S): 500 INJECTION, POWDER, FOR SOLUTION INTRAMUSCULAR; INTRAVENOUS at 09:26

## 2023-11-02 RX ADMIN — Medication 4: at 17:12

## 2023-11-02 RX ADMIN — Medication 250 MILLIGRAM(S): at 23:06

## 2023-11-02 RX ADMIN — Medication 6: at 12:27

## 2023-11-02 RX ADMIN — SODIUM CHLORIDE 3 MILLILITER(S): 9 INJECTION INTRAMUSCULAR; INTRAVENOUS; SUBCUTANEOUS at 04:44

## 2023-11-02 RX ADMIN — Medication 250 MILLIGRAM(S): at 12:27

## 2023-11-02 RX ADMIN — INSULIN GLARGINE 12 UNIT(S): 100 INJECTION, SOLUTION SUBCUTANEOUS at 22:56

## 2023-11-02 RX ADMIN — Medication 2: at 08:04

## 2023-11-02 RX ADMIN — SODIUM CHLORIDE 3 MILLILITER(S): 9 INJECTION INTRAMUSCULAR; INTRAVENOUS; SUBCUTANEOUS at 21:25

## 2023-11-02 NOTE — DIETITIAN INITIAL EVALUATION ADULT - PERTINENT LABORATORY DATA
11-01    136  |  100  |  9   ----------------------------<  246<H>  3.4<L>   |  28  |  0.59    Ca    8.5      01 Nov 2023 08:04    TPro  5.9<L>  /  Alb  1.9<L>  /  TBili  0.6  /  DBili  x   /  AST  12<L>  /  ALT  18  /  AlkPhos  96  11-01  POCT Blood Glucose.: 281 mg/dL (11-02-23 @ 12:16)  A1C with Estimated Average Glucose Result: 11.6 % (11-01-23 @ 08:04)

## 2023-11-02 NOTE — DIETITIAN INITIAL EVALUATION ADULT - ADD RECOMMEND
1) C/w current PO diet and encourage protein-rich foods, maximize food preferences   2) Premier protein shake BID to optimize nutritional needs (provides 160 kcal, 30 g protein/ shake)   3) Monitor bowel movements, if no BM for >3 days, consider implementing bowel regimen.   4) MVI w/ minerals daily to ensure 100% RDA met   5) Consider adding thiamine 100 mg daily 2/2 malnutrition   6) Monitor daily lytes/min and replete prn   7) Monitor daily wt to track/trend changes  8) Monitor and optimize BG levels between 140-180 mg/dL by medical management   9) Upon d/c, f/u w/ endocrinologist and outpatient RD/CDCES for further DM management   RD will continue to monitor PO intake, labs, hydration, and wt prn.

## 2023-11-02 NOTE — DIETITIAN INITIAL EVALUATION ADULT - OTHER INFO
64 y/o F w/no sig PMH, presents c/o infection of Right first toe w/ malodorous drainage occurring over past several weeks. She's been sent in by her podiatrist for IV abx for osteomyelitis. Pt states this started after getting a pedicure. She has not been to any doctor for many years.     Upon initial assessment pt was NPO 2/2 OR for R great toe amputation, was feeling anxious at time of RD visit. Unable to provide exact UBW, stating "my weight is on the board." Bed scale wt of 168# taken on 11/1 however moderate to severe edema may be skewing current wt appearance. POCT since admission elevated (received 8 units Lantus and Admelog 14 units x 24 hrs) and A1c doc'd at 11.6% indicating new onset of T2DM. CDCES to provide DM education to pt. NFPE reveals mild to moderate muscle/fat wasting. Will add Premier protein shake BID to optimize nutritional needs (provides 160 kcal, 30 g protein/ shake). See recommendations below.

## 2023-11-02 NOTE — DIETITIAN INITIAL EVALUATION ADULT - NSFNSGIIOFT_GEN_A_CORE
I&O's Detail    01 Nov 2023 07:01  -  02 Nov 2023 07:00  --------------------------------------------------------  IN:    Other (mL): 350 mL  Total IN: 350 mL    OUT:  Total OUT: 0 mL    Total NET: 350 mL

## 2023-11-02 NOTE — PROVIDER CONTACT NOTE (CRITICAL VALUE NOTIFICATION) - TEST AND RESULT REPORTED:
bl cx preliminary +growth aerobic bottle gram positive cocci in pairs
Tissue culture from right great toe, Few polymorphonuclear leukocytes per low power field gram+ cocci in pairs per oil power field
pre liminary blood cultures +growth in aerobic bottles and +growth anaerobic bottles; gram + cocci in pairs

## 2023-11-02 NOTE — DIETITIAN INITIAL EVALUATION ADULT - PERTINENT MEDS FT
MEDICATIONS  (STANDING):  cefTRIAXone Injectable. 2000 milliGRAM(s) IV Push every 24 hours  dextrose 5%. 1000 milliLiter(s) (100 mL/Hr) IV Continuous <Continuous>  dextrose 5%. 1000 milliLiter(s) (50 mL/Hr) IV Continuous <Continuous>  dextrose 50% Injectable 12.5 Gram(s) IV Push once  dextrose 50% Injectable 25 Gram(s) IV Push once  dextrose 50% Injectable 25 Gram(s) IV Push once  glucagon  Injectable 1 milliGRAM(s) IntraMuscular once  insulin glargine Injectable (LANTUS) 8 Unit(s) SubCutaneous at bedtime  insulin lispro (ADMELOG) corrective regimen sliding scale   SubCutaneous at bedtime  insulin lispro (ADMELOG) corrective regimen sliding scale   SubCutaneous three times a day before meals  sodium chloride 0.9% lock flush 3 milliLiter(s) IV Push every 8 hours  vancomycin  IVPB 1000 milliGRAM(s) IV Intermittent every 12 hours    MEDICATIONS  (PRN):  acetaminophen     Tablet .. 650 milliGRAM(s) Oral every 6 hours PRN Temp greater or equal to 38C (100.4F), Mild Pain (1 - 3)  aluminum hydroxide/magnesium hydroxide/simethicone Suspension 30 milliLiter(s) Oral every 4 hours PRN Dyspepsia  dextrose Oral Gel 15 Gram(s) Oral once PRN Blood Glucose LESS THAN 70 milliGRAM(s)/deciliter  melatonin 3 milliGRAM(s) Oral at bedtime PRN Insomnia  ondansetron Injectable 4 milliGRAM(s) IV Push every 8 hours PRN Nausea and/or Vomiting

## 2023-11-02 NOTE — DIETITIAN NUTRITION RISK NOTIFICATION - TREATMENT: THE FOLLOWING DIET HAS BEEN RECOMMENDED
Diet, Consistent Carbohydrate w/Evening Snack (11-01-23 @ 14:40) [Active]  Diet, Consistent Carbohydrate w/Evening Snack (11-01-23 @ 13:11) [Hold]

## 2023-11-02 NOTE — DIETITIAN INITIAL EVALUATION ADULT - ORAL INTAKE PTA/DIET HISTORY
Lives at home by herself, able to cook and shop w/o difficulties. Endorses fair appetite PTA. Diet recall obtained: 2 meals per day - consumes sandwich for lunch and "whatever I feel like making" for dinner.

## 2023-11-03 LAB
-  CEFTRIAXONE: SIGNIFICANT CHANGE UP
-  CEFTRIAXONE: SIGNIFICANT CHANGE UP
-  PENICILLIN: SIGNIFICANT CHANGE UP
-  PENICILLIN: SIGNIFICANT CHANGE UP
-  VANCOMYCIN: SIGNIFICANT CHANGE UP
-  VANCOMYCIN: SIGNIFICANT CHANGE UP
CULTURE RESULTS: ABNORMAL
CULTURE RESULTS: ABNORMAL
GLUCOSE BLDC GLUCOMTR-MCNC: 172 MG/DL — HIGH (ref 70–99)
GLUCOSE BLDC GLUCOMTR-MCNC: 172 MG/DL — HIGH (ref 70–99)
GLUCOSE BLDC GLUCOMTR-MCNC: 181 MG/DL — HIGH (ref 70–99)
GLUCOSE BLDC GLUCOMTR-MCNC: 181 MG/DL — HIGH (ref 70–99)
GLUCOSE BLDC GLUCOMTR-MCNC: 212 MG/DL — HIGH (ref 70–99)
GLUCOSE BLDC GLUCOMTR-MCNC: 212 MG/DL — HIGH (ref 70–99)
GLUCOSE BLDC GLUCOMTR-MCNC: 229 MG/DL — HIGH (ref 70–99)
GLUCOSE BLDC GLUCOMTR-MCNC: 229 MG/DL — HIGH (ref 70–99)
METHOD TYPE: SIGNIFICANT CHANGE UP
METHOD TYPE: SIGNIFICANT CHANGE UP
ORGANISM # SPEC MICROSCOPIC CNT: ABNORMAL
ORGANISM # SPEC MICROSCOPIC CNT: ABNORMAL
ORGANISM # SPEC MICROSCOPIC CNT: SIGNIFICANT CHANGE UP
ORGANISM # SPEC MICROSCOPIC CNT: SIGNIFICANT CHANGE UP
SPECIMEN SOURCE: SIGNIFICANT CHANGE UP
SPECIMEN SOURCE: SIGNIFICANT CHANGE UP

## 2023-11-03 PROCEDURE — 99232 SBSQ HOSP IP/OBS MODERATE 35: CPT

## 2023-11-03 RX ORDER — INSULIN GLARGINE 100 [IU]/ML
16 INJECTION, SOLUTION SUBCUTANEOUS AT BEDTIME
Refills: 0 | Status: DISCONTINUED | OUTPATIENT
Start: 2023-11-03 | End: 2023-11-09

## 2023-11-03 RX ADMIN — CEFTRIAXONE 2000 MILLIGRAM(S): 500 INJECTION, POWDER, FOR SOLUTION INTRAMUSCULAR; INTRAVENOUS at 10:38

## 2023-11-03 RX ADMIN — Medication 2: at 08:13

## 2023-11-03 RX ADMIN — Medication 4: at 17:09

## 2023-11-03 RX ADMIN — SODIUM CHLORIDE 3 MILLILITER(S): 9 INJECTION INTRAMUSCULAR; INTRAVENOUS; SUBCUTANEOUS at 21:40

## 2023-11-03 RX ADMIN — SODIUM CHLORIDE 3 MILLILITER(S): 9 INJECTION INTRAMUSCULAR; INTRAVENOUS; SUBCUTANEOUS at 13:55

## 2023-11-03 RX ADMIN — Medication 250 MILLIGRAM(S): at 10:36

## 2023-11-03 RX ADMIN — SODIUM CHLORIDE 3 MILLILITER(S): 9 INJECTION INTRAMUSCULAR; INTRAVENOUS; SUBCUTANEOUS at 05:11

## 2023-11-03 RX ADMIN — INSULIN GLARGINE 16 UNIT(S): 100 INJECTION, SOLUTION SUBCUTANEOUS at 21:44

## 2023-11-03 RX ADMIN — Medication 4: at 12:19

## 2023-11-03 NOTE — PHYSICAL THERAPY INITIAL EVALUATION ADULT - PERTINENT HX OF CURRENT PROBLEM, REHAB EVAL
Pt admitted to  secondary to right foot pain and right first toe ulcer. MRI right foot: extensive osseous destruction of the 1st toe consistent with OM.

## 2023-11-03 NOTE — PHYSICAL THERAPY INITIAL EVALUATION ADULT - ACTIVE RANGE OF MOTION EXAMINATION, REHAB EVAL
R DF neutral./Left UE Active ROM was WFL (within functional limits)/Right UE Active ROM was WFL (within functional limits)/Left LE Active ROM was WFL (within functional limits)/Right LE Active ROM was WFL (within functional limits)

## 2023-11-03 NOTE — PHYSICAL THERAPY INITIAL EVALUATION ADULT - PLANNED THERAPY INTERVENTIONS, PT EVAL
Eval, amb, transfer, bed mobility x 15'. Pt left in bed with all observation section equipment/material intact and bed alarm activated. Pt with no c/o pain. Will cont to follow pt and increase as tolerated./bed mobility training/gait training/transfer training

## 2023-11-04 LAB
GLUCOSE BLDC GLUCOMTR-MCNC: 127 MG/DL — HIGH (ref 70–99)
GLUCOSE BLDC GLUCOMTR-MCNC: 127 MG/DL — HIGH (ref 70–99)
GLUCOSE BLDC GLUCOMTR-MCNC: 128 MG/DL — HIGH (ref 70–99)
GLUCOSE BLDC GLUCOMTR-MCNC: 128 MG/DL — HIGH (ref 70–99)
GLUCOSE BLDC GLUCOMTR-MCNC: 148 MG/DL — HIGH (ref 70–99)
GLUCOSE BLDC GLUCOMTR-MCNC: 148 MG/DL — HIGH (ref 70–99)
GLUCOSE BLDC GLUCOMTR-MCNC: 167 MG/DL — HIGH (ref 70–99)
GLUCOSE BLDC GLUCOMTR-MCNC: 167 MG/DL — HIGH (ref 70–99)

## 2023-11-04 PROCEDURE — 99233 SBSQ HOSP IP/OBS HIGH 50: CPT

## 2023-11-04 RX ORDER — ATORVASTATIN CALCIUM 80 MG/1
10 TABLET, FILM COATED ORAL AT BEDTIME
Refills: 0 | Status: DISCONTINUED | OUTPATIENT
Start: 2023-11-04 | End: 2023-11-09

## 2023-11-04 RX ORDER — LACTOBACILLUS ACIDOPHILUS 100MM CELL
1 CAPSULE ORAL
Refills: 0 | Status: DISCONTINUED | OUTPATIENT
Start: 2023-11-04 | End: 2023-11-09

## 2023-11-04 RX ADMIN — CEFTRIAXONE 2000 MILLIGRAM(S): 500 INJECTION, POWDER, FOR SOLUTION INTRAMUSCULAR; INTRAVENOUS at 09:53

## 2023-11-04 RX ADMIN — SODIUM CHLORIDE 3 MILLILITER(S): 9 INJECTION INTRAMUSCULAR; INTRAVENOUS; SUBCUTANEOUS at 05:48

## 2023-11-04 RX ADMIN — SODIUM CHLORIDE 3 MILLILITER(S): 9 INJECTION INTRAMUSCULAR; INTRAVENOUS; SUBCUTANEOUS at 13:37

## 2023-11-04 RX ADMIN — SODIUM CHLORIDE 3 MILLILITER(S): 9 INJECTION INTRAMUSCULAR; INTRAVENOUS; SUBCUTANEOUS at 20:46

## 2023-11-04 RX ADMIN — Medication 2: at 12:17

## 2023-11-04 RX ADMIN — INSULIN GLARGINE 16 UNIT(S): 100 INJECTION, SOLUTION SUBCUTANEOUS at 20:42

## 2023-11-04 RX ADMIN — ATORVASTATIN CALCIUM 10 MILLIGRAM(S): 80 TABLET, FILM COATED ORAL at 20:43

## 2023-11-04 RX ADMIN — Medication 1 TABLET(S): at 17:18

## 2023-11-04 RX ADMIN — Medication 1 TABLET(S): at 09:54

## 2023-11-05 LAB
CHOLEST SERPL-MCNC: 138 MG/DL — SIGNIFICANT CHANGE UP
CHOLEST SERPL-MCNC: 138 MG/DL — SIGNIFICANT CHANGE UP
GLUCOSE BLDC GLUCOMTR-MCNC: 111 MG/DL — HIGH (ref 70–99)
GLUCOSE BLDC GLUCOMTR-MCNC: 111 MG/DL — HIGH (ref 70–99)
GLUCOSE BLDC GLUCOMTR-MCNC: 154 MG/DL — HIGH (ref 70–99)
GLUCOSE BLDC GLUCOMTR-MCNC: 154 MG/DL — HIGH (ref 70–99)
GLUCOSE BLDC GLUCOMTR-MCNC: 215 MG/DL — HIGH (ref 70–99)
GLUCOSE BLDC GLUCOMTR-MCNC: 215 MG/DL — HIGH (ref 70–99)
GLUCOSE BLDC GLUCOMTR-MCNC: 216 MG/DL — HIGH (ref 70–99)
GLUCOSE BLDC GLUCOMTR-MCNC: 216 MG/DL — HIGH (ref 70–99)
HDLC SERPL-MCNC: 25 MG/DL — LOW
HDLC SERPL-MCNC: 25 MG/DL — LOW
LIPID PNL WITH DIRECT LDL SERPL: 93 MG/DL — SIGNIFICANT CHANGE UP
LIPID PNL WITH DIRECT LDL SERPL: 93 MG/DL — SIGNIFICANT CHANGE UP
NON HDL CHOLESTEROL: 113 MG/DL — SIGNIFICANT CHANGE UP
NON HDL CHOLESTEROL: 113 MG/DL — SIGNIFICANT CHANGE UP
TRIGL SERPL-MCNC: 108 MG/DL — SIGNIFICANT CHANGE UP
TRIGL SERPL-MCNC: 108 MG/DL — SIGNIFICANT CHANGE UP

## 2023-11-05 PROCEDURE — 99232 SBSQ HOSP IP/OBS MODERATE 35: CPT

## 2023-11-05 RX ADMIN — SODIUM CHLORIDE 3 MILLILITER(S): 9 INJECTION INTRAMUSCULAR; INTRAVENOUS; SUBCUTANEOUS at 05:35

## 2023-11-05 RX ADMIN — ATORVASTATIN CALCIUM 10 MILLIGRAM(S): 80 TABLET, FILM COATED ORAL at 20:30

## 2023-11-05 RX ADMIN — Medication 1 TABLET(S): at 14:11

## 2023-11-05 RX ADMIN — Medication 2: at 11:51

## 2023-11-05 RX ADMIN — SODIUM CHLORIDE 3 MILLILITER(S): 9 INJECTION INTRAMUSCULAR; INTRAVENOUS; SUBCUTANEOUS at 14:12

## 2023-11-05 RX ADMIN — CEFTRIAXONE 2000 MILLIGRAM(S): 500 INJECTION, POWDER, FOR SOLUTION INTRAMUSCULAR; INTRAVENOUS at 10:28

## 2023-11-05 RX ADMIN — Medication 4: at 17:05

## 2023-11-05 RX ADMIN — INSULIN GLARGINE 16 UNIT(S): 100 INJECTION, SOLUTION SUBCUTANEOUS at 20:31

## 2023-11-05 RX ADMIN — SODIUM CHLORIDE 3 MILLILITER(S): 9 INJECTION INTRAMUSCULAR; INTRAVENOUS; SUBCUTANEOUS at 20:31

## 2023-11-05 RX ADMIN — Medication 1 TABLET(S): at 10:28

## 2023-11-05 RX ADMIN — Medication 1 TABLET(S): at 17:05

## 2023-11-06 LAB
CULTURE RESULTS: ABNORMAL
GLUCOSE BLDC GLUCOMTR-MCNC: 146 MG/DL — HIGH (ref 70–99)
GLUCOSE BLDC GLUCOMTR-MCNC: 146 MG/DL — HIGH (ref 70–99)
GLUCOSE BLDC GLUCOMTR-MCNC: 152 MG/DL — HIGH (ref 70–99)
GLUCOSE BLDC GLUCOMTR-MCNC: 152 MG/DL — HIGH (ref 70–99)
GLUCOSE BLDC GLUCOMTR-MCNC: 186 MG/DL — HIGH (ref 70–99)
GLUCOSE BLDC GLUCOMTR-MCNC: 186 MG/DL — HIGH (ref 70–99)
GLUCOSE BLDC GLUCOMTR-MCNC: 233 MG/DL — HIGH (ref 70–99)
GLUCOSE BLDC GLUCOMTR-MCNC: 233 MG/DL — HIGH (ref 70–99)
SPECIMEN SOURCE: SIGNIFICANT CHANGE UP

## 2023-11-06 PROCEDURE — 71045 X-RAY EXAM CHEST 1 VIEW: CPT | Mod: 26

## 2023-11-06 PROCEDURE — 99232 SBSQ HOSP IP/OBS MODERATE 35: CPT

## 2023-11-06 RX ADMIN — SODIUM CHLORIDE 3 MILLILITER(S): 9 INJECTION INTRAMUSCULAR; INTRAVENOUS; SUBCUTANEOUS at 12:35

## 2023-11-06 RX ADMIN — SODIUM CHLORIDE 3 MILLILITER(S): 9 INJECTION INTRAMUSCULAR; INTRAVENOUS; SUBCUTANEOUS at 04:47

## 2023-11-06 RX ADMIN — INSULIN GLARGINE 16 UNIT(S): 100 INJECTION, SOLUTION SUBCUTANEOUS at 21:05

## 2023-11-06 RX ADMIN — Medication 4: at 16:58

## 2023-11-06 RX ADMIN — ATORVASTATIN CALCIUM 10 MILLIGRAM(S): 80 TABLET, FILM COATED ORAL at 21:06

## 2023-11-06 RX ADMIN — Medication 2: at 13:10

## 2023-11-06 RX ADMIN — Medication 3 MILLIGRAM(S): at 21:06

## 2023-11-06 RX ADMIN — CEFTRIAXONE 2000 MILLIGRAM(S): 500 INJECTION, POWDER, FOR SOLUTION INTRAMUSCULAR; INTRAVENOUS at 09:47

## 2023-11-06 RX ADMIN — SODIUM CHLORIDE 3 MILLILITER(S): 9 INJECTION INTRAMUSCULAR; INTRAVENOUS; SUBCUTANEOUS at 19:38

## 2023-11-06 NOTE — ADVANCED PRACTICE NURSE CONSULT - ASSESSMENT
Patient is awake and alert. PICC insertion along with risks, benefits, possible complications and infection prevention explained to pt who verbalized understanding. All questions addressed and written signed consent to place PICC placed on chart. Time out along with hand washing by 2 RN's done. Right arm cleansed with CHG. Patient draped in usual fashion with sterile sheet. Using strict sterile technique, under ultrasound guidance, placed Navilyst Xcela 4F single lumen PICC with PASV technology (lot#[id number]) cut to 40cm into right brachial vein. Brisk blood return flushed with 20ml NS. Minimal blood loss and pt tolerated procedure well. All sharps accounted for. Dressing and end cap in place. Xray done and report given to district RN.

## 2023-11-07 LAB
ANION GAP SERPL CALC-SCNC: 5 MMOL/L — SIGNIFICANT CHANGE UP (ref 5–17)
ANION GAP SERPL CALC-SCNC: 5 MMOL/L — SIGNIFICANT CHANGE UP (ref 5–17)
BUN SERPL-MCNC: 9 MG/DL — SIGNIFICANT CHANGE UP (ref 7–23)
BUN SERPL-MCNC: 9 MG/DL — SIGNIFICANT CHANGE UP (ref 7–23)
CALCIUM SERPL-MCNC: 9.2 MG/DL — SIGNIFICANT CHANGE UP (ref 8.5–10.1)
CALCIUM SERPL-MCNC: 9.2 MG/DL — SIGNIFICANT CHANGE UP (ref 8.5–10.1)
CHLORIDE SERPL-SCNC: 106 MMOL/L — SIGNIFICANT CHANGE UP (ref 96–108)
CHLORIDE SERPL-SCNC: 106 MMOL/L — SIGNIFICANT CHANGE UP (ref 96–108)
CO2 SERPL-SCNC: 30 MMOL/L — SIGNIFICANT CHANGE UP (ref 22–31)
CO2 SERPL-SCNC: 30 MMOL/L — SIGNIFICANT CHANGE UP (ref 22–31)
CREAT SERPL-MCNC: 0.62 MG/DL — SIGNIFICANT CHANGE UP (ref 0.5–1.3)
CREAT SERPL-MCNC: 0.62 MG/DL — SIGNIFICANT CHANGE UP (ref 0.5–1.3)
CULTURE RESULTS: SIGNIFICANT CHANGE UP
EGFR: 99 ML/MIN/1.73M2 — SIGNIFICANT CHANGE UP
EGFR: 99 ML/MIN/1.73M2 — SIGNIFICANT CHANGE UP
GLUCOSE BLDC GLUCOMTR-MCNC: 131 MG/DL — HIGH (ref 70–99)
GLUCOSE BLDC GLUCOMTR-MCNC: 131 MG/DL — HIGH (ref 70–99)
GLUCOSE BLDC GLUCOMTR-MCNC: 135 MG/DL — HIGH (ref 70–99)
GLUCOSE BLDC GLUCOMTR-MCNC: 135 MG/DL — HIGH (ref 70–99)
GLUCOSE BLDC GLUCOMTR-MCNC: 159 MG/DL — HIGH (ref 70–99)
GLUCOSE BLDC GLUCOMTR-MCNC: 159 MG/DL — HIGH (ref 70–99)
GLUCOSE BLDC GLUCOMTR-MCNC: 179 MG/DL — HIGH (ref 70–99)
GLUCOSE BLDC GLUCOMTR-MCNC: 179 MG/DL — HIGH (ref 70–99)
GLUCOSE SERPL-MCNC: 135 MG/DL — HIGH (ref 70–99)
GLUCOSE SERPL-MCNC: 135 MG/DL — HIGH (ref 70–99)
HCT VFR BLD CALC: 38.9 % — SIGNIFICANT CHANGE UP (ref 34.5–45)
HCT VFR BLD CALC: 38.9 % — SIGNIFICANT CHANGE UP (ref 34.5–45)
HGB BLD-MCNC: 12.6 G/DL — SIGNIFICANT CHANGE UP (ref 11.5–15.5)
HGB BLD-MCNC: 12.6 G/DL — SIGNIFICANT CHANGE UP (ref 11.5–15.5)
MCHC RBC-ENTMCNC: 28.9 PG — SIGNIFICANT CHANGE UP (ref 27–34)
MCHC RBC-ENTMCNC: 28.9 PG — SIGNIFICANT CHANGE UP (ref 27–34)
MCHC RBC-ENTMCNC: 32.4 GM/DL — SIGNIFICANT CHANGE UP (ref 32–36)
MCHC RBC-ENTMCNC: 32.4 GM/DL — SIGNIFICANT CHANGE UP (ref 32–36)
MCV RBC AUTO: 89.2 FL — SIGNIFICANT CHANGE UP (ref 80–100)
MCV RBC AUTO: 89.2 FL — SIGNIFICANT CHANGE UP (ref 80–100)
PLATELET # BLD AUTO: 317 K/UL — SIGNIFICANT CHANGE UP (ref 150–400)
PLATELET # BLD AUTO: 317 K/UL — SIGNIFICANT CHANGE UP (ref 150–400)
POTASSIUM SERPL-MCNC: 3.6 MMOL/L — SIGNIFICANT CHANGE UP (ref 3.5–5.3)
POTASSIUM SERPL-MCNC: 3.6 MMOL/L — SIGNIFICANT CHANGE UP (ref 3.5–5.3)
POTASSIUM SERPL-SCNC: 3.6 MMOL/L — SIGNIFICANT CHANGE UP (ref 3.5–5.3)
POTASSIUM SERPL-SCNC: 3.6 MMOL/L — SIGNIFICANT CHANGE UP (ref 3.5–5.3)
RBC # BLD: 4.36 M/UL — SIGNIFICANT CHANGE UP (ref 3.8–5.2)
RBC # BLD: 4.36 M/UL — SIGNIFICANT CHANGE UP (ref 3.8–5.2)
RBC # FLD: 12.7 % — SIGNIFICANT CHANGE UP (ref 10.3–14.5)
RBC # FLD: 12.7 % — SIGNIFICANT CHANGE UP (ref 10.3–14.5)
SODIUM SERPL-SCNC: 141 MMOL/L — SIGNIFICANT CHANGE UP (ref 135–145)
SODIUM SERPL-SCNC: 141 MMOL/L — SIGNIFICANT CHANGE UP (ref 135–145)
SPECIMEN SOURCE: SIGNIFICANT CHANGE UP
SURGICAL PATHOLOGY STUDY: SIGNIFICANT CHANGE UP
SURGICAL PATHOLOGY STUDY: SIGNIFICANT CHANGE UP
WBC # BLD: 6.05 K/UL — SIGNIFICANT CHANGE UP (ref 3.8–10.5)
WBC # BLD: 6.05 K/UL — SIGNIFICANT CHANGE UP (ref 3.8–10.5)
WBC # FLD AUTO: 6.05 K/UL — SIGNIFICANT CHANGE UP (ref 3.8–10.5)
WBC # FLD AUTO: 6.05 K/UL — SIGNIFICANT CHANGE UP (ref 3.8–10.5)

## 2023-11-07 PROCEDURE — 99232 SBSQ HOSP IP/OBS MODERATE 35: CPT

## 2023-11-07 RX ADMIN — SODIUM CHLORIDE 3 MILLILITER(S): 9 INJECTION INTRAMUSCULAR; INTRAVENOUS; SUBCUTANEOUS at 05:30

## 2023-11-07 RX ADMIN — Medication 2: at 11:55

## 2023-11-07 RX ADMIN — SODIUM CHLORIDE 3 MILLILITER(S): 9 INJECTION INTRAMUSCULAR; INTRAVENOUS; SUBCUTANEOUS at 22:02

## 2023-11-07 RX ADMIN — SODIUM CHLORIDE 3 MILLILITER(S): 9 INJECTION INTRAMUSCULAR; INTRAVENOUS; SUBCUTANEOUS at 13:53

## 2023-11-07 RX ADMIN — CEFTRIAXONE 2000 MILLIGRAM(S): 500 INJECTION, POWDER, FOR SOLUTION INTRAMUSCULAR; INTRAVENOUS at 09:24

## 2023-11-07 RX ADMIN — ATORVASTATIN CALCIUM 10 MILLIGRAM(S): 80 TABLET, FILM COATED ORAL at 21:10

## 2023-11-07 RX ADMIN — INSULIN GLARGINE 16 UNIT(S): 100 INJECTION, SOLUTION SUBCUTANEOUS at 21:10

## 2023-11-07 RX ADMIN — Medication 2: at 17:01

## 2023-11-08 LAB
GLUCOSE BLDC GLUCOMTR-MCNC: 109 MG/DL — HIGH (ref 70–99)
GLUCOSE BLDC GLUCOMTR-MCNC: 109 MG/DL — HIGH (ref 70–99)
GLUCOSE BLDC GLUCOMTR-MCNC: 161 MG/DL — HIGH (ref 70–99)
GLUCOSE BLDC GLUCOMTR-MCNC: 161 MG/DL — HIGH (ref 70–99)
GLUCOSE BLDC GLUCOMTR-MCNC: 197 MG/DL — HIGH (ref 70–99)
GLUCOSE BLDC GLUCOMTR-MCNC: 197 MG/DL — HIGH (ref 70–99)
GLUCOSE BLDC GLUCOMTR-MCNC: 277 MG/DL — HIGH (ref 70–99)
GLUCOSE BLDC GLUCOMTR-MCNC: 277 MG/DL — HIGH (ref 70–99)

## 2023-11-08 PROCEDURE — 99232 SBSQ HOSP IP/OBS MODERATE 35: CPT

## 2023-11-08 RX ADMIN — CEFTRIAXONE 2000 MILLIGRAM(S): 500 INJECTION, POWDER, FOR SOLUTION INTRAMUSCULAR; INTRAVENOUS at 09:49

## 2023-11-08 RX ADMIN — Medication 1 TABLET(S): at 11:56

## 2023-11-08 RX ADMIN — Medication 2: at 11:55

## 2023-11-08 RX ADMIN — ATORVASTATIN CALCIUM 10 MILLIGRAM(S): 80 TABLET, FILM COATED ORAL at 20:46

## 2023-11-08 RX ADMIN — Medication 2: at 17:05

## 2023-11-08 RX ADMIN — SODIUM CHLORIDE 3 MILLILITER(S): 9 INJECTION INTRAMUSCULAR; INTRAVENOUS; SUBCUTANEOUS at 16:00

## 2023-11-08 RX ADMIN — SODIUM CHLORIDE 3 MILLILITER(S): 9 INJECTION INTRAMUSCULAR; INTRAVENOUS; SUBCUTANEOUS at 05:30

## 2023-11-08 RX ADMIN — Medication 2: at 22:31

## 2023-11-08 RX ADMIN — INSULIN GLARGINE 16 UNIT(S): 100 INJECTION, SOLUTION SUBCUTANEOUS at 22:30

## 2023-11-08 RX ADMIN — SODIUM CHLORIDE 3 MILLILITER(S): 9 INJECTION INTRAMUSCULAR; INTRAVENOUS; SUBCUTANEOUS at 22:32

## 2023-11-08 RX ADMIN — Medication 1 TABLET(S): at 17:07

## 2023-11-08 RX ADMIN — Medication 1 TABLET(S): at 09:48

## 2023-11-08 NOTE — PROGRESS NOTE ADULT - NUTRITIONAL ASSESSMENT
This patient has been assessed with a concern for Malnutrition and has been determined to have a diagnosis/diagnoses of Moderate protein-calorie malnutrition.    This patient is being managed with:   Diet Consistent Carbohydrate w/Evening Snack-  Entered: Nov 1 2023  2:40PM  

## 2023-11-09 ENCOUNTER — TRANSCRIPTION ENCOUNTER (OUTPATIENT)
Age: 63
End: 2023-11-09

## 2023-11-09 VITALS
OXYGEN SATURATION: 99 % | TEMPERATURE: 98 F | DIASTOLIC BLOOD PRESSURE: 61 MMHG | HEART RATE: 71 BPM | SYSTOLIC BLOOD PRESSURE: 129 MMHG | RESPIRATION RATE: 18 BRPM

## 2023-11-09 LAB
GLUCOSE BLDC GLUCOMTR-MCNC: 118 MG/DL — HIGH (ref 70–99)
GLUCOSE BLDC GLUCOMTR-MCNC: 118 MG/DL — HIGH (ref 70–99)
GLUCOSE BLDC GLUCOMTR-MCNC: 145 MG/DL — HIGH (ref 70–99)
GLUCOSE BLDC GLUCOMTR-MCNC: 145 MG/DL — HIGH (ref 70–99)

## 2023-11-09 PROCEDURE — 99239 HOSP IP/OBS DSCHRG MGMT >30: CPT

## 2023-11-09 RX ORDER — CEFTRIAXONE 500 MG/1
2 INJECTION, POWDER, FOR SOLUTION INTRAMUSCULAR; INTRAVENOUS
Qty: 0 | Refills: 0 | DISCHARGE
Start: 2023-11-09

## 2023-11-09 RX ORDER — INSULIN LISPRO 100/ML
2 VIAL (ML) SUBCUTANEOUS
Qty: 0 | Refills: 0 | DISCHARGE
Start: 2023-11-09

## 2023-11-09 RX ORDER — CALCIUM CARBONATE 500(1250)
1 TABLET ORAL
Refills: 0 | DISCHARGE

## 2023-11-09 RX ORDER — OMEGA-3 ACID ETHYL ESTERS 1 G
1 CAPSULE ORAL
Refills: 0 | DISCHARGE

## 2023-11-09 RX ORDER — ATORVASTATIN CALCIUM 80 MG/1
1 TABLET, FILM COATED ORAL
Qty: 0 | Refills: 0 | DISCHARGE
Start: 2023-11-09

## 2023-11-09 RX ORDER — INSULIN GLARGINE 100 [IU]/ML
16 INJECTION, SOLUTION SUBCUTANEOUS
Qty: 0 | Refills: 0 | DISCHARGE
Start: 2023-11-09

## 2023-11-09 RX ADMIN — SODIUM CHLORIDE 3 MILLILITER(S): 9 INJECTION INTRAMUSCULAR; INTRAVENOUS; SUBCUTANEOUS at 06:51

## 2023-11-09 RX ADMIN — Medication 1 TABLET(S): at 14:12

## 2023-11-09 RX ADMIN — Medication 1 TABLET(S): at 08:21

## 2023-11-09 RX ADMIN — CEFTRIAXONE 2000 MILLIGRAM(S): 500 INJECTION, POWDER, FOR SOLUTION INTRAMUSCULAR; INTRAVENOUS at 09:34

## 2023-11-09 NOTE — PROGRESS NOTE ADULT - REASON FOR ADMISSION
foot ulcer

## 2023-11-09 NOTE — PROGRESS NOTE ADULT - SUBJECTIVE AND OBJECTIVE BOX
63 F w/no sig PMH, presents c/o infection of Right first toe w/ malodorous drainage occurring over past several weeks. She's been sent in by her podiatrist for IV abx for osteomyelitis. Pt denies any pain, fever/chills, n/v/d, abd pain, dysuria, cp, sob.  Pt states this started after getting a pedicure. She has not been to any doctor for many years.     In ED, given zosyn, vanco, seen by podiatry (note reviewed), gluc 325, Na 129, temp 99, HR 100s, sbp 140s  MRI Rt foot 1.  Extensive osseous destruction of the 1st toe is again seen consistent with osteomyelitis. 2.  Faint marrow edema of the 1st metatarsal head and hallux sesamoids   may be reactive in the absence of confluent T1 marrow replacement, however, early infection cannot be absolutely excluded.    LE doppler b/l NEG for dvt    11.01:  no cp, no sob  no exertional angina, no dyspnea  11.02: no distress, s/p Rt foot excisional debridement   11.03: pod#1, no pain, no dyspnea         REVIEW OF SYSTEMS:    CONSTITUTIONAL: No weakness, No fevers or chills  ENT: No ear ache, No sorethroat  NECK: No pain, No stiffness  RESPIRATORY: No cough, No wheezing, No hemoptysis; No dyspnea  CARDIOVASCULAR: No chest pain, No palpitations  GASTROINTESTINAL: No abd pain, No nausea, No vomiting, No hematemesis, No diarrhea or constipation. No melena, No hematochezia.  GENITOURINARY: No dysuria, No  hematuria  NEUROLOGICAL: No diplopia, No paresthesia, No motor dysfunction  MUSCULOSKELETAL: No arthralgia, No myalgia  SKIN: No rashes, or lesions   PSYCH: no anxiety, no suicidal ideation    All other review of systems is negative unless indicated above    Vital Signs Last 24 Hrs  T(C): 37.2 (02 Nov 2023 08:20), Max: 37.7 (01 Nov 2023 23:22)  T(F): 99 (02 Nov 2023 08:20), Max: 99.8 (01 Nov 2023 23:22)  HR: 88 (02 Nov 2023 08:20) (72 - 93)  BP: 127/53 (02 Nov 2023 08:20) (103/48 - 152/69)  RR: 20 (02 Nov 2023 08:20) (18 - 20)  SpO2: 95% (02 Nov 2023 08:20) (94% - 100%)    Parameters below as of 02 Nov 2023 08:20  Patient On (Oxygen Delivery Method): room air        PHYSICAL EXAM:    GENERAL: NAD  HEENT:  NC/AT, EOMI, PERRLA, No scleral icterus, Moist mucous membranes  NECK: Supple, No JVD  CNS:  Alert & Oriented X3, Motor Strength 5/5 B/L upper and lower extremities; DTRs 2+ intact   LUNG: Normal Breath sounds, Clear to auscultation bilaterally, No rales, No rhonchi, No wheezing  HEART: RRR; No murmurs, No rubs  ABDOMEN: +BS, ST/ND/NT  GENITOURINARY: Voiding, Bladder not distended  EXTREMITIES: Rt foot bulky surgical dressing present   MUSCULOSKELTAL: Joints normal ROM, No TTP, No effusion  VAGINAL: deferred  SKIN: no rashes  RECTAL: deferred, not indicated  BREAST: deferred                          11.1   12.47 )-----------( 249      ( 01 Nov 2023 08:04 )             32.8     11-01    136  |  100  |  9   ----------------------------<  246<H>  3.4<L>   |  28  |  0.59    Ca    8.5      01 Nov 2023 08:04    TPro  5.9<L>  /  Alb  1.9<L>  /  TBili  0.6  /  DBili  x   /  AST  12<L>  /  ALT  18  /  AlkPhos  96  11-01    Vancomycin levels:   Cultures:     MEDICATIONS  (STANDING):  cefTRIAXone Injectable. 2000 milliGRAM(s) IV Push every 24 hours  glucagon  Injectable 1 milliGRAM(s) IntraMuscular once  insulin glargine Injectable (LANTUS) 12 Unit(s) SubCutaneous at bedtime  insulin lispro (ADMELOG) corrective regimen sliding scale   SubCutaneous three times a day before meals  insulin lispro (ADMELOG) corrective regimen sliding scale   SubCutaneous at bedtime  sodium chloride 0.9% lock flush 3 milliLiter(s) IV Push every 8 hours      all labs reviewed  all imaging reviewed    a/p:    1. Right foot OM involving 1st MT and 1st Toe:  2. Sepsis due to Streptococcus Viridans  bacteremia    -s/p excisional debridement by Podiatry pod#1  local wound care per surgery    TTE: normal EF, mild-mod TR  no evidence of vegetations     c/w Ceftriaxone day#3    repeat Blood Cx  ID follow up: will need PICC and home IV Abx x 6weeks    3. DM type II:  c/w ADA  ISS  Increase Lantus to 16u qHS    4. Hypokalemia:   replete K
63 F w/no sig PMH, presents c/o infection of Right first toe w/ malodorous drainage occurring over past several weeks. She's been sent in by her podiatrist for IV abx for osteomyelitis. Pt denies any pain, fever/chills, n/v/d, abd pain, dysuria, cp, sob.  Pt states this started after getting a pedicure. She has not been to any doctor for many years.     In ED, given zosyn, vanco, seen by podiatry (note reviewed), gluc 325, Na 129, temp 99, HR 100s, sbp 140s  MRI Rt foot 1.  Extensive osseous destruction of the 1st toe is again seen consistent with osteomyelitis. 2.  Faint marrow edema of the 1st metatarsal head and hallux sesamoids   may be reactive in the absence of confluent T1 marrow replacement, however, early infection cannot be absolutely excluded.    LE doppler b/l NEG for dvt    11.01:  no cp, no sob  no exertional angina, no dyspnea  11.02: no distress, s/p Rt foot excisional debridement   11.03: pod#1, no pain, no dyspnea   11/04/23: No new issues. Discussed with patient regarding plan of care        REVIEW OF SYSTEMS:    CONSTITUTIONAL: No weakness, No fevers or chills  ENT: No ear ache, No sorethroat  NECK: No pain, No stiffness  RESPIRATORY: No cough, No wheezing, No hemoptysis; No dyspnea  CARDIOVASCULAR: No chest pain, No palpitations  GASTROINTESTINAL: No abd pain, No nausea, No vomiting, No hematemesis, No diarrhea or constipation. No melena, No hematochezia.  GENITOURINARY: No dysuria, No  hematuria  NEUROLOGICAL: No diplopia, No paresthesia, No motor dysfunction  MUSCULOSKELETAL: No arthralgia, No myalgia  SKIN: No rashes, or lesions   PSYCH: no anxiety, no suicidal ideation    All other review of systems is negative unless indicated above        Vital Signs Last 24 Hrs  T(C): 36.5 (04 Nov 2023 09:08), Max: 36.5 (04 Nov 2023 09:08)  T(F): 97.7 (04 Nov 2023 09:08), Max: 97.7 (04 Nov 2023 09:08)  HR: 69 (04 Nov 2023 09:08) (69 - 75)  BP: 136/65 (04 Nov 2023 09:08) (128/68 - 136/65)  BP(mean): 84 (03 Nov 2023 16:07) (84 - 84)  RR: 18 (04 Nov 2023 09:08) (18 - 18)  SpO2: 100% (04 Nov 2023 09:08) (95% - 100%)    Parameters below as of 04 Nov 2023 09:08  Patient On (Oxygen Delivery Method): room air            PHYSICAL EXAM:    GENERAL: NAD  HEENT:  NC/AT, EOMI, PERRLA, No scleral icterus, Moist mucous membranes  NECK: Supple, No JVD  CNS:  Alert & Oriented X3, Motor Strength 5/5 B/L upper and lower extremities; DTRs 2+ intact   LUNG: Normal Breath sounds, Clear to auscultation bilaterally, No rales, No rhonchi, No wheezing  HEART: RRR; No murmurs, No rubs  ABDOMEN: +BS, ST/ND/NT  GENITOURINARY: Voiding, Bladder not distended  EXTREMITIES: Rt foot bulky surgical dressing present   MUSCULOSKELTAL: Joints normal ROM, No TTP, No effusion  VAGINAL: deferred  SKIN: no rashes                CAPILLARY BLOOD GLUCOSE      POCT Blood Glucose.: 167 mg/dL (04 Nov 2023 12:10)  POCT Blood Glucose.: 127 mg/dL (04 Nov 2023 08:25)  POCT Blood Glucose.: 172 mg/dL (03 Nov 2023 20:49)  POCT Blood Glucose.: 212 mg/dL (03 Nov 2023 17:08)          MEDICATIONS  (STANDING):  cefTRIAXone Injectable. 2000 milliGRAM(s) IV Push every 24 hours  glucagon  Injectable 1 milliGRAM(s) IntraMuscular once  insulin glargine Injectable (LANTUS) 12 Unit(s) SubCutaneous at bedtime  insulin lispro (ADMELOG) corrective regimen sliding scale   SubCutaneous three times a day before meals  insulin lispro (ADMELOG) corrective regimen sliding scale   SubCutaneous at bedtime  sodium chloride 0.9% lock flush 3 milliLiter(s) IV Push every 8 hours      all labs reviewed  all imaging reviewed    a/p:    1. Right foot OM involving 1st MT and 1st Toe:  2. Sepsis due to Streptococcus Viridans  bacteremia  -s/p excisional debridement by Podiatry pod#2  local wound care per surgery  TTE: normal EF, mild-mod TR  no evidence of vegetations  c/w Ceftriaxone day#4  repeat Blood Cx: no growth  ID follow up: will need PICC and home IV Abx x 6weeks    3. DM type II:  c/w ADA  ISS  Increase Lantus to 16u qHS    4. Hypokalemia:   replete K    Disposition: PICC on Monday
63 F w/no sig PMH, presents c/o infection of Right first toe w/ malodorous drainage occurring over past several weeks. She's been sent in by her podiatrist for IV abx for osteomyelitis. Pt denies any pain, fever/chills, n/v/d, abd pain, dysuria, cp, sob.  Pt states this started after getting a pedicure. She has not been to any doctor for many years.   In ED, given zosyn, vanco, seen by podiatry (note reviewed), gluc 325, Na 129, temp 99, HR 100s, sbp 140s  MRI Rt foot 1.  Extensive osseous destruction of the 1st toe is again seen consistent with osteomyelitis. 2.  Faint marrow edema of the 1st metatarsal head and hallux sesamoids   may be reactive in the absence of confluent T1 marrow replacement, however, early infection cannot be absolutely excluded.  LE doppler b/l NEG for dvt    11.01:  no cp, no sob  no exertional angina, no dyspnea  11.02: no distress, s/p Rt foot excisional debridement   11.03: pod#1, no pain, no dyspnea   11/04/23: No new issues. Discussed with patient regarding plan of care.  11/05/23: Patient denies any new complaints. Possible PICC tomorrow for longer IV antibiotic.  11/06/23: Patient now interested in EM        REVIEW OF SYSTEMS:    CONSTITUTIONAL: No weakness, No fevers or chills  ENT: No ear ache, No sore throat  NECK: No pain, No stiffness  RESPIRATORY: No cough, No wheezing, No hemoptysis; No dyspnea  CARDIOVASCULAR: No chest pain, No palpitations  GASTROINTESTINAL: No abd pain, No nausea, No vomiting, No hematemesis, No diarrhea or constipation. No melena, No hematochezia.  GENITOURINARY: No dysuria, No  hematuria  NEUROLOGICAL: No diplopia, No paresthesia, No motor dysfunction  MUSCULOSKELETAL: No arthralgia, No myalgia  SKIN: No rashes, or lesions   PSYCH: no anxiety, no suicidal ideation    All other review of systems is negative unless indicated above        Vital Signs Last 24 Hrs  T(C): 36.4 (06 Nov 2023 08:55), Max: 36.8 (05 Nov 2023 15:55)  T(F): 97.5 (06 Nov 2023 08:55), Max: 98.3 (05 Nov 2023 15:55)  HR: 69 (06 Nov 2023 08:55) (69 - 83)  BP: 142/74 (06 Nov 2023 08:55) (110/57 - 142/74)  BP(mean): --  RR: 18 (06 Nov 2023 08:55) (17 - 18)  SpO2: 95% (06 Nov 2023 08:55) (95% - 99%)    Parameters below as of 06 Nov 2023 08:55  Patient On (Oxygen Delivery Method): room air          PHYSICAL EXAM:    GENERAL: NAD  HEENT:  NC/AT, EOMI, PERRLA, No scleral icterus, Moist mucous membranes  NECK: Supple, No JVD  CNS:  Alert & Oriented X3, Motor Strength 5/5 B/L upper and lower extremities; DTRs 2+ intact   LUNG: Normal Breath sounds, Clear to auscultation bilaterally, No rales, No rhonchi, No wheezing  HEART: RRR; No murmurs, No rubs  ABDOMEN: +BS, ST/ND/NT  GENITOURINARY: Voiding, Bladder not distended  EXTREMITIES: Rt foot bulky surgical dressing present   MUSCULOSKELTAL: Joints normal ROM, No TTP, No effusion  VAGINAL: deferred  SKIN: no rashes        CAPILLARY BLOOD GLUCOSE      POCT Blood Glucose.: 186 mg/dL (06 Nov 2023 13:07)  POCT Blood Glucose.: 146 mg/dL (06 Nov 2023 08:00)  POCT Blood Glucose.: 215 mg/dL (05 Nov 2023 20:28)  POCT Blood Glucose.: 216 mg/dL (05 Nov 2023 17:01)        MEDICATIONS  (STANDING):  cefTRIAXone Injectable. 2000 milliGRAM(s) IV Push every 24 hours  glucagon  Injectable 1 milliGRAM(s) IntraMuscular once  insulin glargine Injectable (LANTUS) 12 Unit(s) SubCutaneous at bedtime  insulin lispro (ADMELOG) corrective regimen sliding scale   SubCutaneous three times a day before meals  insulin lispro (ADMELOG) corrective regimen sliding scale   SubCutaneous at bedtime  sodium chloride 0.9% lock flush 3 milliLiter(s) IV Push every 8 hours      all labs reviewed  all imaging reviewed    a/p:    1. Right foot OM involving 1st MT and 1st Toe:  2. Sepsis due to Streptococcus Viridans  bacteremia  -s/p excisional debridement by Podiatry   local wound care per surgery  TTE: normal EF, mild-mod TR  no evidence of vegetations  c/w Ceftriaxone day  repeat Blood Cx: no growth  ID follow up: will need IV Abx x 6weeks via PICC    3. DM type II: uncontrolled  HGBA1C 11.2  c/w ADA  ISS  Continue Lantus     4. Hypokalemia:   repleted K    Disposition: EM possibly tomorrow
63 F w/no sig PMH, presents c/o infection of Right first toe w/ malodorous drainage occurring over past several weeks. She's been sent in by her podiatrist for IV abx for osteomyelitis. Pt denies any pain, fever/chills, n/v/d, abd pain, dysuria, cp, sob.  Pt states this started after getting a pedicure. She has not been to any doctor for many years.   In ED, given zosyn, vanco, seen by podiatry (note reviewed), gluc 325, Na 129, temp 99, HR 100s, sbp 140s  MRI Rt foot 1.  Extensive osseous destruction of the 1st toe is again seen consistent with osteomyelitis. 2.  Faint marrow edema of the 1st metatarsal head and hallux sesamoids   may be reactive in the absence of confluent T1 marrow replacement, however, early infection cannot be absolutely excluded.  LE doppler b/l NEG for dvt    11.01:  no cp, no sob  no exertional angina, no dyspnea  11.02: no distress, s/p Rt foot excisional debridement   11.03: pod#1, no pain, no dyspnea   11/04/23: No new issues. Discussed with patient regarding plan of care.  11/05/23: Patient denies any new complaints. Possible PICC tomorrow for longer IV antibiotic.  11/06/23: Patient now interested in EM  11/07/23: Waiting for rehab bed.         REVIEW OF SYSTEMS:    CONSTITUTIONAL: No weakness, No fevers or chills  ENT: No ear ache, No sore throat  NECK: No pain, No stiffness  RESPIRATORY: No cough, No wheezing, No hemoptysis; No dyspnea  CARDIOVASCULAR: No chest pain, No palpitations  GASTROINTESTINAL: No abd pain, No nausea, No vomiting, No hematemesis, No diarrhea or constipation. No melena, No hematochezia.  GENITOURINARY: No dysuria, No  hematuria  NEUROLOGICAL: No diplopia, No paresthesia, No motor dysfunction  MUSCULOSKELETAL: No arthralgia, No myalgia  SKIN: No rashes, or lesions   PSYCH: no anxiety, no suicidal ideation    All other review of systems is negative unless indicated above        Vital Signs Last 24 Hrs  T(C): 36.4 (07 Nov 2023 07:35), Max: 36.7 (06 Nov 2023 16:03)  T(F): 97.5 (07 Nov 2023 07:35), Max: 98 (06 Nov 2023 16:03)  HR: 64 (07 Nov 2023 07:35) (63 - 68)  BP: 113/65 (07 Nov 2023 07:35) (113/65 - 125/68)  BP(mean): --  RR: 18 (07 Nov 2023 07:35) (18 - 18)  SpO2: 96% (07 Nov 2023 07:35) (96% - 97%)    Parameters below as of 07 Nov 2023 07:35  Patient On (Oxygen Delivery Method): room air        PHYSICAL EXAM:    GENERAL: NAD  HEENT:  NC/AT, EOMI, PERRLA, No scleral icterus, Moist mucous membranes  NECK: Supple, No JVD  CNS:  Alert & Oriented X3, Motor Strength 5/5 B/L upper and lower extremities; DTRs 2+ intact   LUNG: Normal Breath sounds, Clear to auscultation bilaterally, No rales, No rhonchi, No wheezing  HEART: RRR; No murmurs, No rubs  ABDOMEN: +BS, ST/ND/NT  GENITOURINARY: Voiding, Bladder not distended  EXTREMITIES: Rt foot bulky surgical dressing present   MUSCULOSKELTAL: Joints normal ROM, No TTP, No effusion  VAGINAL: deferred  SKIN: no rashes        CAPILLARY BLOOD GLUCOSE      POCT Blood Glucose.: 186 mg/dL (06 Nov 2023 13:07)  POCT Blood Glucose.: 146 mg/dL (06 Nov 2023 08:00)  POCT Blood Glucose.: 215 mg/dL (05 Nov 2023 20:28)  POCT Blood Glucose.: 216 mg/dL (05 Nov 2023 17:01)        MEDICATIONS  (STANDING):  cefTRIAXone Injectable. 2000 milliGRAM(s) IV Push every 24 hours  glucagon  Injectable 1 milliGRAM(s) IntraMuscular once  insulin glargine Injectable (LANTUS) 12 Unit(s) SubCutaneous at bedtime  insulin lispro (ADMELOG) corrective regimen sliding scale   SubCutaneous three times a day before meals  insulin lispro (ADMELOG) corrective regimen sliding scale   SubCutaneous at bedtime  sodium chloride 0.9% lock flush 3 milliLiter(s) IV Push every 8 hours      all labs reviewed  all imaging reviewed    a/p:    1. Right foot OM involving 1st MT and 1st Toe:  2. Sepsis due to Streptococcus Viridans  bacteremia  -s/p excisional debridement by Podiatry   local wound care per surgery  TTE: normal EF, mild-mod TR  no evidence of vegetations  c/w Ceftriaxone day  repeat Blood Cx: no growth  ID follow up: will need IV Abx x 6weeks via PICC    3. DM type II: uncontrolled  HGBA1C 11.2  c/w ADA  ISS  Continue Lantus     4. Hypokalemia:   repleted K    Disposition: EM once bed is available
63 F w/no sig PMH, presents c/o infection of Right first toe w/ malodorous drainage occurring over past several weeks. She's been sent in by her podiatrist for IV abx for osteomyelitis. Pt denies any pain, fever/chills, n/v/d, abd pain, dysuria, cp, sob.  Pt states this started after getting a pedicure. She has not been to any doctor for many years.   In ED, given zosyn, vanco, seen by podiatry (note reviewed), gluc 325, Na 129, temp 99, HR 100s, sbp 140s  MRI Rt foot 1.  Extensive osseous destruction of the 1st toe is again seen consistent with osteomyelitis. 2.  Faint marrow edema of the 1st metatarsal head and hallux sesamoids   may be reactive in the absence of confluent T1 marrow replacement, however, early infection cannot be absolutely excluded.  LE doppler b/l NEG for dvt    11.01:  no cp, no sob  no exertional angina, no dyspnea  11.02: no distress, s/p Rt foot excisional debridement   11.03: pod#1, no pain, no dyspnea   11/04/23: No new issues. Discussed with patient regarding plan of care.  11/05/23: Patient denies any new complaints. Possible PICC tomorrow for longer IV antibiotic.  11/06/23: Patient now interested in EM  11/07/23: Waiting for rehab bed.   11/08/23: No rehab bed available yet        REVIEW OF SYSTEMS:    CONSTITUTIONAL: No weakness, No fevers or chills  ENT: No ear ache, No sore throat  NECK: No pain, No stiffness  RESPIRATORY: No cough, No wheezing, No hemoptysis; No dyspnea  CARDIOVASCULAR: No chest pain, No palpitations  GASTROINTESTINAL: No abd pain, No nausea, No vomiting, No hematemesis, No diarrhea or constipation. No melena, No hematochezia.  GENITOURINARY: No dysuria, No  hematuria  NEUROLOGICAL: No diplopia, No paresthesia, No motor dysfunction  MUSCULOSKELETAL: No arthralgia, No myalgia  SKIN: No rashes, or lesions   PSYCH: no anxiety, no suicidal ideation    All other review of systems is negative unless indicated above        Vital Signs Last 24 Hrs  T(C): 36.2 (08 Nov 2023 08:24), Max: 37.1 (07 Nov 2023 15:57)  T(F): 97.1 (08 Nov 2023 08:24), Max: 98.7 (07 Nov 2023 15:57)  HR: 71 (08 Nov 2023 08:24) (71 - 71)  BP: 117/59 (08 Nov 2023 08:24) (117/59 - 128/71)  BP(mean): 85 (07 Nov 2023 15:57) (85 - 85)  RR: 18 (08 Nov 2023 08:24) (18 - 18)  SpO2: 97% (08 Nov 2023 08:24) (97% - 97%)    Parameters below as of 08 Nov 2023 08:24  Patient On (Oxygen Delivery Method): room air            PHYSICAL EXAM:    GENERAL: NAD  HEENT:  NC/AT, EOMI, PERRLA, No scleral icterus, Moist mucous membranes  NECK: Supple, No JVD  CNS:  Alert & Oriented X3, Motor Strength 5/5 B/L upper and lower extremities; DTRs 2+ intact   LUNG: Normal Breath sounds, Clear to auscultation bilaterally, No rales, No rhonchi, No wheezing  HEART: RRR; No murmurs, No rubs  ABDOMEN: +BS, ST/ND/NT  GENITOURINARY: Voiding, Bladder not distended  EXTREMITIES: Rt foot bulky surgical dressing present   MUSCULOSKELTAL: Joints normal ROM, No TTP, No effusion  VAGINAL: deferred  SKIN: no rashes                              12.6   6.05  )-----------( 317      ( 07 Nov 2023 08:32 )             38.9     07 Nov 2023 08:32    141    |  106    |  9      ----------------------------<  135    3.6     |  30     |  0.62     Ca    9.2        07 Nov 2023 08:32          CAPILLARY BLOOD GLUCOSE      POCT Blood Glucose.: 161 mg/dL (08 Nov 2023 11:37)  POCT Blood Glucose.: 109 mg/dL (08 Nov 2023 07:32)  POCT Blood Glucose.: 131 mg/dL (07 Nov 2023 21:07)  POCT Blood Glucose.: 159 mg/dL (07 Nov 2023 16:57)        Urinalysis Basic - ( 07 Nov 2023 08:32 )    Color: x / Appearance: x / SG: x / pH: x  Gluc: 135 mg/dL / Ketone: x  / Bili: x / Urobili: x   Blood: x / Protein: x / Nitrite: x   Leuk Esterase: x / RBC: x / WBC x   Sq Epi: x / Non Sq Epi: x / Bacteria: x              MEDICATIONS  (STANDING):  cefTRIAXone Injectable. 2000 milliGRAM(s) IV Push every 24 hours  glucagon  Injectable 1 milliGRAM(s) IntraMuscular once  insulin glargine Injectable (LANTUS) 12 Unit(s) SubCutaneous at bedtime  insulin lispro (ADMELOG) corrective regimen sliding scale   SubCutaneous three times a day before meals  insulin lispro (ADMELOG) corrective regimen sliding scale   SubCutaneous at bedtime  sodium chloride 0.9% lock flush 3 milliLiter(s) IV Push every 8 hours      all labs reviewed  all imaging reviewed    a/p:    1. Right foot OM involving 1st MT and 1st Toe:  2. Sepsis due to Streptococcus Viridans  bacteremia  -s/p excisional debridement by Podiatry   local wound care per surgery  TTE: normal EF, mild-mod TR  no evidence of vegetations  c/w Ceftriaxone   repeat Blood Cx: no growth  ID follow up: will need IV Abx x 6weeks via PICC    3. DM type II: uncontrolled  HGBA1C 11.2  c/w ADA  ISS  Continue Lantus     4. Hypokalemia:   repleted K    Disposition: EM once bed is available
Date of service: 11-02-23 @ 13:28    Lying in bed in NAD  s/p foot surgery  Has right foot pain  Has low grade fever    ROS: no fever or chills; denies dizziness, no HA, no SOB or cough, no abdominal pain, no diarrhea or constipation; no dysuria    MEDICATIONS  (STANDING):  cefTRIAXone Injectable. 2000 milliGRAM(s) IV Push every 24 hours  dextrose 5%. 1000 milliLiter(s) (100 mL/Hr) IV Continuous <Continuous>  dextrose 5%. 1000 milliLiter(s) (50 mL/Hr) IV Continuous <Continuous>  dextrose 50% Injectable 12.5 Gram(s) IV Push once  dextrose 50% Injectable 25 Gram(s) IV Push once  dextrose 50% Injectable 25 Gram(s) IV Push once  glucagon  Injectable 1 milliGRAM(s) IntraMuscular once  insulin glargine Injectable (LANTUS) 8 Unit(s) SubCutaneous at bedtime  insulin lispro (ADMELOG) corrective regimen sliding scale   SubCutaneous at bedtime  insulin lispro (ADMELOG) corrective regimen sliding scale   SubCutaneous three times a day before meals  sodium chloride 0.9% lock flush 3 milliLiter(s) IV Push every 8 hours  vancomycin  IVPB 1000 milliGRAM(s) IV Intermittent every 12 hours    Vital Signs Last 24 Hrs  T(C): 37.2 (02 Nov 2023 08:20), Max: 37.7 (01 Nov 2023 23:22)  T(F): 99 (02 Nov 2023 08:20), Max: 99.8 (01 Nov 2023 23:22)  HR: 88 (02 Nov 2023 08:20) (68 - 93)  BP: 127/53 (02 Nov 2023 08:20) (103/48 - 152/69)  BP(mean): --  RR: 20 (02 Nov 2023 08:20) (14 - 20)  SpO2: 95% (02 Nov 2023 08:20) (94% - 100%)    Parameters below as of 02 Nov 2023 08:20  Patient On (Oxygen Delivery Method): room air     Physical exam:    Constitutional:  No acute distress  HEENT: NC/AT, EOMI, PERRLA, conjunctivae clear; ears and nose atraumatic  Neck: supple; thyroid not palpable  Back: no tenderness  Respiratory: respiratory effort normal; clear to auscultation  Cardiovascular: S1S2 regular, no murmurs  Abdomen: soft, not tender, not distended, positive BS  Genitourinary: no suprapubic tenderness  Lymphatic: no LN palpable  Musculoskeletal: no muscle tenderness, no joint swelling or tenderness  Extremities: 2+ pedal edema  Right great toe ulcer s/p surgery  Right foot, right ankle, shin and calf erythema, edema and tenderness improving  Neurological/ Psychiatric: AxOx3, judgement and insight normal; moving all extremities  Skin: no rashes; no palpable lesions    Labs: reviewed                        11.1 12.47 )-----------( 249      ( 01 Nov 2023 08:04 )             32.8     11-01    136  |  100  |  9   ----------------------------<  246<H>  3.4<L>   |  28  |  0.59    Ca    8.5      01 Nov 2023 08:04    TPro  5.9<L>  /  Alb  1.9<L>  /  TBili  0.6  /  DBili  x   /  AST  12<L>  /  ALT  18  /  AlkPhos  96  11-01    Vancomycin Level, Trough: 11.6 ug/mL (11-01 @ 20:07)  C-Reactive Protein, Serum: 114 mg/L (10-31-23 @ 15:27)                        11.1   12.47 )-----------( 249      ( 01 Nov 2023 08:04 )             32.8     11-01    136  |  100  |  9   ----------------------------<  246<H>  3.4<L>   |  28  |  0.59    Ca    8.5      01 Nov 2023 08:04    TPro  5.9<L>  /  Alb  1.9<L>  /  TBili  0.6  /  DBili  x   /  AST  12<L>  /  ALT  18  /  AlkPhos  96  11-01     LIVER FUNCTIONS - ( 01 Nov 2023 08:04 )  Alb: 1.9 g/dL / Pro: 5.9 gm/dL / ALK PHOS: 96 U/L / ALT: 18 U/L / AST: 12 U/L / GGT: x           Culture - Acid Fast - Tissue w/Smear (collected 01 Nov 2023 12:19)  Source: .Tissue RIGHT GREAT TOE PROXIMAL PHALANX BONE CX    Culture - Fungal, Other (collected 01 Nov 2023 12:19)  Source: .Other RIGHT GREAT TOE PROXIMAL PHALANX BONE CX  Preliminary Report (02 Nov 2023 10:09):    Testing in progress    Culture - Fungal, Tissue (collected 01 Nov 2023 12:19)  Source: .Tissue RIGHT GREAT TOE PROXIMAL PHALANX BONE CX  Preliminary Report (02 Nov 2023 10:17):    Testing in progress    Culture - Tissue with Gram Stain (collected 01 Nov 2023 12:19)  Source: .Tissue RIGHT GREAT TOE PROXIMAL PHALANX BONE CX  Gram Stain (01 Nov 2023 21:57):    Few polymorphonuclear leukocytes per low power field    Few Gram positive cocci in pairs per oil power field    Culture - Blood (collected 31 Oct 2023 15:39)  Source: .Blood None  Gram Stain (01 Nov 2023 16:58):    Growth in aerobic bottle: Gram positive cocci in pairs    Growth in anaerobic bottle: Gram positive cocci in pairs  Final Report (02 Nov 2023 10:44):    Growth in aerobic and anaerobic bottles: Streptococcus mitis/oralis group    "Susceptibilities not performed"    Direct identification is available within approximately 3-5    hours either by Blood Panel Multiplexed PCR or Direct    MALDI-TOF. Details: [email or web address]  Organism: Blood Culture PCR (02 Nov 2023 10:44)  Organism: Blood Culture PCR (02 Nov 2023 10:44)      Method Type: PCR      -  Streptococcus sp. (Not Grp A, B or S pneumoniae): Detec    Culture - Blood (collected 31 Oct 2023 15:27)  Source: .Blood None  Gram Stain (01 Nov 2023 15:06):    Growth in aerobic bottle: Gram positive cocci in pairs    Growth in anaerobic bottle: Gram positive cocci in pairs  Preliminary Report (02 Nov 2023 10:00):    Growth in aerobic and anaerobic bottles: Streptococcus mitis/oralis group    Susceptibility to follow.    Culture - Other (collected 31 Oct 2023 14:20)  Source: .Other wound on right big toe  Preliminary Report (01 Nov 2023 22:06):    Numerous Streptococcus anginosus "Susceptibilities not performed"    Radiology: all available radiological tests reviewed    < from: MR Foot w/wo IV Cont, Right (10.31.23 @ 17:48) >  1.  Extensive osseous destruction of the 1st toe is again seen consistent   with osteomyelitis.  2.  Faint marrow edema of the 1st metatarsal head and hallux sesamoids   may be reactive in the absence of confluent T1 marrow replacement,   however, early infection cannot be absolutely excluded.    < end of copied text >      Advanced directives addressed: full resuscitation
Date of service: 11-03-23 @ 12:05    Lying in bed in NAD  Has right foot local pain  has ankle warmth  New culture reported    ROS: no fever or chills; denies dizziness, no HA, no SOB or cough, no abdominal pain, no diarrhea or constipation; no dysuria    MEDICATIONS  (STANDING):  cefTRIAXone Injectable. 2000 milliGRAM(s) IV Push every 24 hours  dextrose 5%. 1000 milliLiter(s) (50 mL/Hr) IV Continuous <Continuous>  dextrose 5%. 1000 milliLiter(s) (100 mL/Hr) IV Continuous <Continuous>  dextrose 50% Injectable 12.5 Gram(s) IV Push once  dextrose 50% Injectable 25 Gram(s) IV Push once  dextrose 50% Injectable 25 Gram(s) IV Push once  glucagon  Injectable 1 milliGRAM(s) IntraMuscular once  insulin glargine Injectable (LANTUS) 12 Unit(s) SubCutaneous at bedtime  insulin lispro (ADMELOG) corrective regimen sliding scale   SubCutaneous three times a day before meals  insulin lispro (ADMELOG) corrective regimen sliding scale   SubCutaneous at bedtime  sodium chloride 0.9% lock flush 3 milliLiter(s) IV Push every 8 hours    Vital Signs Last 24 Hrs  T(C): 36.4 (03 Nov 2023 08:10), Max: 36.7 (03 Nov 2023 00:34)  T(F): 97.6 (03 Nov 2023 08:10), Max: 98.1 (03 Nov 2023 00:34)  HR: 74 (03 Nov 2023 08:10) (73 - 79)  BP: 137/70 (03 Nov 2023 08:10) (124/63 - 137/70)  BP(mean): --  RR: 18 (03 Nov 2023 08:10) (18 - 18)  SpO2: 96% (03 Nov 2023 08:10) (95% - 98%)    Parameters below as of 03 Nov 2023 08:10  Patient On (Oxygen Delivery Method): room air     Physical exam:    Constitutional:  No acute distress  HEENT: NC/AT, EOMI, PERRLA, conjunctivae clear; ears and nose atraumatic  Neck: supple; thyroid not palpable  Back: no tenderness  Respiratory: respiratory effort normal; clear to auscultation  Cardiovascular: S1S2 regular, no murmurs  Abdomen: soft, not tender, not distended, positive BS  Genitourinary: no suprapubic tenderness  Lymphatic: no LN palpable  Musculoskeletal: no muscle tenderness, no joint swelling or tenderness  Extremities: 2+ pedal edema  Right great toe ulcer s/p surgery  Right foot, right ankle, shin and calf erythema, edema and tenderness improving  Neurological/ Psychiatric: AxOx3, judgement and insight normal; moving all extremities  Skin: no rashes; no palpable lesions    Labs: reviewed    Vancomycin Level, Trough: 9.7 ug/mL (11-02 @ 21:51)  Vancomycin Level, Trough: 11.6 ug/mL (11-01 @ 20:07)    C-Reactive Protein, Serum: 114 mg/L (10-31-23 @ 15:27)                        11.1   12.47 )-----------( 249      ( 01 Nov 2023 08:04 )             32.8     11-01    136  |  100  |  9   ----------------------------<  246<H>  3.4<L>   |  28  |  0.59    Ca    8.5      01 Nov 2023 08:04    TPro  5.9<L>  /  Alb  1.9<L>  /  TBili  0.6  /  DBili  x   /  AST  12<L>  /  ALT  18  /  AlkPhos  96  11-01    Vancomycin Level, Trough: 11.6 ug/mL (11-01 @ 20:07)  C-Reactive Protein, Serum: 114 mg/L (10-31-23 @ 15:27)                        11.1   12.47 )-----------( 249      ( 01 Nov 2023 08:04 )             32.8     11-01    136  |  100  |  9   ----------------------------<  246<H>  3.4<L>   |  28  |  0.59    Ca    8.5      01 Nov 2023 08:04    TPro  5.9<L>  /  Alb  1.9<L>  /  TBili  0.6  /  DBili  x   /  AST  12<L>  /  ALT  18  /  AlkPhos  96  11-01     LIVER FUNCTIONS - ( 01 Nov 2023 08:04 )  Alb: 1.9 g/dL / Pro: 5.9 gm/dL / ALK PHOS: 96 U/L / ALT: 18 U/L / AST: 12 U/L / GGT: x           Culture - Fungal, Tissue (collected 01 Nov 2023 12:19)  Source: .Tissue RIGHT GREAT TOE PROXIMAL PHALANX BONE CX  Preliminary Report (02 Nov 2023 10:17):    Testing in progress    Culture - Acid Fast - Tissue w/Smear (collected 01 Nov 2023 12:19)  Source: .Tissue RIGHT GREAT TOE PROXIMAL PHALANX BONE CX    Culture - Tissue with Gram Stain (collected 01 Nov 2023 12:19)  Source: .Tissue RIGHT GREAT TOE PROXIMAL PHALANX BONE CX  Gram Stain (01 Nov 2023 21:57):    Few polymorphonuclear leukocytes per low power field    Few Gram positive cocci in pairs per oil power field  Preliminary Report (02 Nov 2023 23:43):    Few Streptococcus anginosus    "Susceptibilities not performed"    Culture - Fungal, Other (collected 01 Nov 2023 12:19)  Source: .Other RIGHT GREAT TOE PROXIMAL PHALANX BONE CX  Preliminary Report (02 Nov 2023 10:09):    Testing in progress    Culture - Surgical Swab (collected 01 Nov 2023 12:19)  Source: .Surgical Swab RIGHT GREAT TOE PROXIMAL PHALANX BONE CX  Preliminary Report (02 Nov 2023 23:44):    Moderate Streptococcus anginosus    "Susceptibilities not performed"    Culture - Blood (collected 31 Oct 2023 15:39)  Source: .Blood None  Gram Stain (01 Nov 2023 16:58):    Growth in aerobic bottle: Gram positive cocci in pairs    Growth in anaerobic bottle: Gram positive cocci in pairs  Final Report (02 Nov 2023 10:44):    Growth in aerobic and anaerobic bottles: Streptococcus mitis/oralis group    "Susceptibilities not performed"    Direct identification is available within approximately 3-5    hours either by Blood Panel Multiplexed PCR or Direct    MALDI-TOF. Details: [email or web address]  Organism: Blood Culture PCR (02 Nov 2023 10:44)  Organism: Blood Culture PCR (02 Nov 2023 10:44)      Method Type: PCR      -  Streptococcus sp. (Not Grp A, B or S pneumoniae): Detec    Culture - Blood (collected 31 Oct 2023 15:27)  Source: .Blood None  Gram Stain (01 Nov 2023 15:06):    Growth in aerobic bottle: Gram positive cocci in pairs    Growth in anaerobic bottle: Gram positive cocci in pairs  Final Report (03 Nov 2023 07:30):    Growth in aerobic and anaerobic bottles: Streptococcus mitis/oralis group  Organism: Streptococcus mitis/oralis group (03 Nov 2023 07:30)  Organism: Streptococcus mitis/oralis group (03 Nov 2023 07:30)      Method Type: MARIBEL      -  Ceftriaxone: S <=0.25      -  Penicillin: S 0.06      -  Vancomycin: S 1    Culture - Other (collected 31 Oct 2023 14:20)  Source: .Other wound on right big toe  Final Report (02 Nov 2023 18:06):    Numerous Streptococcus anginosus "Susceptibilities not performed"    Normal skin jones isolated    Radiology: all available radiological tests reviewed    < from: MR Foot w/wo IV Cont, Right (10.31.23 @ 17:48) >  1.  Extensive osseous destruction of the 1st toe is again seen consistent   with osteomyelitis.  2.  Faint marrow edema of the 1st metatarsal head and hallux sesamoids   may be reactive in the absence of confluent T1 marrow replacement,   however, early infection cannot be absolutely excluded.    < end of copied text >      Advanced directives addressed: full resuscitation
Patient is a 63y old  Female who presents with a chief complaint of     HPI:      Allergies    No Known Allergies    Intolerances        MEDICATIONS  (STANDING):  piperacillin/tazobactam IVPB. 3.375 Gram(s) IV Intermittent once  piperacillin/tazobactam IVPB.. 3.375 Gram(s) IV Intermittent once  vancomycin  IVPB 1250 milliGRAM(s) IV Intermittent once    MEDICATIONS  (PRN):        RADIOLOGY                    
Patient is a 63y old  Female who presents with a chief complaint of foot ulcer (01 Nov 2023 11:20)      HPI:  HPI:  63F w/no sig PMH, presents c/o infection of Right first toe w/ malodorous drainage occurring over past several weeks. She's been sent in by her podiatrist for IV abx for osteomyelitis. Pt denies any pain, fever/chills, n/v/d, abd pain, dysuria, cp, sob.  Pt states this started after getting a pedicure. She has not been to any doctor for many years.     In ED, given zosyn, vanco, seen by podiatry (note reviewed), gluc 325, Na 129, temp 99, HR 100s, sbp 140s  MRI Rt foot 1.  Extensive osseous destruction of the 1st toe is again seen consistent with osteomyelitis. 2.  Faint marrow edema of the 1st metatarsal head and hallux sesamoids   may be reactive in the absence of confluent T1 marrow replacement, however, early infection cannot be absolutely excluded.    LE doppler b/l NEG for dvt    PAST MEDICAL & SURGICAL HISTORY:  no sig PMH/PSH    Review of Systems:   CONSTITUTIONAL: No fever.  EYES: No eye pain or discharge.  ENMT:  No sinus or throat pain  NECK: No pain or stiffness  RESPIRATORY: No cough, wheezing, chills or hemoptysis; No shortness of breath  CARDIOVASCULAR: No chest pain, palpitations, dizziness, or leg swelling  GASTROINTESTINAL: No abdominal or epigastric pain. No nausea, vomiting, or hematemesis; No diarrhea or constipation. No melena or hematochezia.  GENITOURINARY: No dysuria or incontinence  NEUROLOGICAL: No headaches, memory loss, loss of strength, numbness, or tremors  SKIN: No rashes.  MUSCULOSKELETAL: No joint pain or swelling; No muscle, back, or extremity pain ++ see hpi   PSYCHIATRIC: No depression, anxiety, mood swings, or difficulty sleeping    Social History:   denies smoking/etoh/drug use  lives alone, ind ADLs    FAMILY HISTORY:  pt denies       MEDICATIONS  (STANDING):  dextrose 5%. 1000 milliLiter(s) (100 mL/Hr) IV Continuous <Continuous>  dextrose 5%. 1000 milliLiter(s) (50 mL/Hr) IV Continuous <Continuous>  dextrose 50% Injectable 12.5 Gram(s) IV Push once  dextrose 50% Injectable 25 Gram(s) IV Push once  dextrose 50% Injectable 25 Gram(s) IV Push once  glucagon  Injectable 1 milliGRAM(s) IntraMuscular once  insulin glargine Injectable (LANTUS) 8 Unit(s) SubCutaneous at bedtime  insulin lispro (ADMELOG) corrective regimen sliding scale   SubCutaneous at bedtime  insulin lispro (ADMELOG) corrective regimen sliding scale   SubCutaneous three times a day before meals  piperacillin/tazobactam IVPB.. 3.375 Gram(s) IV Intermittent once    MEDICATIONS  (PRN):  acetaminophen     Tablet .. 650 milliGRAM(s) Oral every 6 hours PRN Temp greater or equal to 38C (100.4F), Mild Pain (1 - 3)  aluminum hydroxide/magnesium hydroxide/simethicone Suspension 30 milliLiter(s) Oral every 4 hours PRN Dyspepsia  dextrose Oral Gel 15 Gram(s) Oral once PRN Blood Glucose LESS THAN 70 milliGRAM(s)/deciliter  melatonin 3 milliGRAM(s) Oral at bedtime PRN Insomnia  ondansetron Injectable 4 milliGRAM(s) IV Push every 8 hours PRN Nausea and/or Vomiting      PHYSICAL EXAM:  Vital Signs Last 24 Hrs  T(C): 37.4 (31 Oct 2023 18:12), Max: 37.4 (31 Oct 2023 18:12)  T(F): 99.3 (31 Oct 2023 18:12), Max: 99.3 (31 Oct 2023 18:12)  HR: 94 (31 Oct 2023 18:12) (94 - 104)  BP: 144/69 (31 Oct 2023 18:12) (140/72 - 144/69)  BP(mean): 88 (31 Oct 2023 13:49) (88 - 88)  RR: 18 (31 Oct 2023 18:12) (18 - 18)  SpO2: 96% (31 Oct 2023 18:12) (96% - 98%)    Parameters below as of 31 Oct 2023 18:12  Patient On (Oxygen Delivery Method): room air  GENERAL: NAD,   HEAD:  Atraumatic, Normocephalic  EYES: EOMI, PERRLA, conjunctiva and sclera clear  ENT: normal hearing, no nasal discharge, throat clear, dentition normal  NECK: Supple, No JVD, no LAD, no thyromegaly   CHEST/LUNG: Clear to auscultation bilaterally; No wheeze, respirations unlabored  HEART: Regular rate and rhythm; No murmurs, rubs, or gallops  ABDOMEN: Soft, Nontender, Nondistended; Bowel sounds present, no HSM  EXTREMITIES:  dec Peripheral Pulses of LE, No clubbing, cyanosis, +mild  edema RLE, RT first toe w/ swelling/erythema/draining ulcer  PSYCH: AAOx3, normal behavior  NEUROLOGY: non-focal, sensory and cn 2-12 intact, speech/language intact  SKIN: No visible rashes or lesions    LABS:                        12.4   16.24 )-----------( 313      ( 31 Oct 2023 15:27 )             37.2     10-31    129<L>  |  95<L>  |  12  ----------------------------<  325<H>  4.0   |  25  |  0.87    Ca    8.9      31 Oct 2023 15:27    TPro  7.4  /  Alb  2.4<L>  /  TBili  0.8  /  DBili  x   /  AST  12<L>  /  ALT  23  /  AlkPhos  111  10-31    PT/INR - ( 31 Oct 2023 15:27 )   PT: 13.7 sec;   INR: 1.22 ratio         PTT - ( 31 Oct 2023 15:27 )  PTT:31.7 sec      Urinalysis Basic - ( 31 Oct 2023 15:27 )    Color: x / Appearance: x / SG: x / pH: x  Gluc: 325 mg/dL / Ketone: x  / Bili: x / Urobili: x   Blood: x / Protein: x / Nitrite: x   Leuk Esterase: x / RBC: x / WBC x   Sq Epi: x / Non Sq Epi: x / Bacteria: x        RADIOLOGY & ADDITIONAL TESTS:    Imaging Personally Reviewed:  MRI Rt foot 1.  Extensive osseous destruction of the 1st toe is again seen consistent with osteomyelitis. 2.  Faint marrow edema of the 1st metatarsal head and hallux sesamoids   may be reactive in the absence of confluent T1 marrow replacement, however, early infection cannot be absolutely excluded.    LE doppler b/l NEG for dvt    EKG Personally Reviewed:    Consultant(s) Notes Reviewed:      Care Discussed with Consultants/Other Providers:   (31 Oct 2023 19:32)      Allergies    No Known Allergies    Intolerances        MEDICATIONS  (STANDING):  cefTRIAXone Injectable. 2000 milliGRAM(s) IV Push every 24 hours  dextrose 5%. 1000 milliLiter(s) (50 mL/Hr) IV Continuous <Continuous>  dextrose 5%. 1000 milliLiter(s) (100 mL/Hr) IV Continuous <Continuous>  dextrose 50% Injectable 25 Gram(s) IV Push once  dextrose 50% Injectable 12.5 Gram(s) IV Push once  dextrose 50% Injectable 25 Gram(s) IV Push once  glucagon  Injectable 1 milliGRAM(s) IntraMuscular once  insulin glargine Injectable (LANTUS) 8 Unit(s) SubCutaneous at bedtime  insulin lispro (ADMELOG) corrective regimen sliding scale   SubCutaneous at bedtime  insulin lispro (ADMELOG) corrective regimen sliding scale   SubCutaneous three times a day before meals  sodium chloride 0.9% lock flush 3 milliLiter(s) IV Push every 8 hours  sodium chloride 0.9%. 1000 milliLiter(s) (125 mL/Hr) IV Continuous <Continuous>  vancomycin  IVPB 1000 milliGRAM(s) IV Intermittent every 12 hours    MEDICATIONS  (PRN):  acetaminophen     Tablet .. 650 milliGRAM(s) Oral every 6 hours PRN Temp greater or equal to 38C (100.4F), Mild Pain (1 - 3)  aluminum hydroxide/magnesium hydroxide/simethicone Suspension 30 milliLiter(s) Oral every 4 hours PRN Dyspepsia  dextrose Oral Gel 15 Gram(s) Oral once PRN Blood Glucose LESS THAN 70 milliGRAM(s)/deciliter  melatonin 3 milliGRAM(s) Oral at bedtime PRN Insomnia  ondansetron Injectable 4 milliGRAM(s) IV Push every 8 hours PRN Nausea and/or Vomiting        RADIOLOGY    CBC Full  -  ( 01 Nov 2023 08:04 )  WBC Count : 12.47 K/uL  RBC Count : 3.76 M/uL  Hemoglobin : 11.1 g/dL  Hematocrit : 32.8 %  Platelet Count - Automated : 249 K/uL  Mean Cell Volume : 87.2 fl  Mean Cell Hemoglobin : 29.5 pg  Mean Cell Hemoglobin Concentration : 33.8 gm/dL  Auto Neutrophil # : 9.60 K/uL  Auto Lymphocyte # : 1.63 K/uL  Auto Monocyte # : 1.01 K/uL  Auto Eosinophil # : 0.07 K/uL  Auto Basophil # : 0.07 K/uL  Auto Neutrophil % : 76.9 %  Auto Lymphocyte % : 13.1 %  Auto Monocyte % : 8.1 %  Auto Eosinophil % : 0.6 %  Auto Basophil % : 0.6 %      11-01    136  |  100  |  9   ----------------------------<  246<H>  3.4<L>   |  28  |  0.59    Ca    8.5      01 Nov 2023 08:04    TPro  5.9<L>  /  Alb  1.9<L>  /  TBili  0.6  /  DBili  x   /  AST  12<L>  /  ALT  18  /  AlkPhos  96  11-01      < from: Xray Foot AP + Lateral, Right (11.01.23 @ 13:58) >    ACC: [id number] EXAM:  XR FOOT 2 VIEWS RT   ORDERED BY: KATY DAILEY     PROCEDURE DATE:  11/01/2023          INTERPRETATION:  Clinical history: 63-year-old female osteomyelitis.    Two views of the right foot are correlated with the MRI of 10/31/2023.    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    Soft tissue swelling in the forefoot with no other acute findings.    IMPRESSION:    Unremarkable postoperative views    --- End of Report ---            RIVKA LEE DO; Attending Radiologist  This document has been electronically signed. Nov 1 2023  3:51PM    < end of copied text >  Culture - Blood (10.31.23 @ 15:39)   - Streptococcus sp. (Not Grp A, B or S pneumoniae): Detec  Gram Stain:   Growth in aerobic bottle: Gram positive cocci in pairs   Growth in anaerobic bottle: Gram positive cocci in pairs  Specimen Source: .Blood None  Organism: Blood Culture PCR  Culture Results:   Growth in aerobic bottle: Streptococcus mitis/oralis group   Alpha hemolytic streptoccous (Not Streptococcus pneumoniae or   Enterococcus)   isolated from a single blood culture set may represent contamination.   Contact the Microbiology Department at [phone] if susceptibility   testing is clinically indicated.   Growth in anaerobic bottle: Gram positive cocci in pairs   Direct identification is available within approximately 3-5   hours either by Blood Panel Multiplexed PCR or Direct   MALDI-TOF. Details: [email or web address]  Organism Identification: Blood Culture PCR  Method Type: PCR      Historical Values  Culture - Blood (10.31.23 @ 15:39)   - Streptococcus sp. (Not Grp A, B or S pneumoniae): Detec  Gram Stain:   Growth in aerobic bottle: Gram positive cocci in pairs   Growth in anaerobic bottle: Gram positive cocci in pairs  Specimen Source: .Blood None  Organism: Blood Culture PCR  Culture Results:   Growth in aerobic bottle: Streptococcus mitis/oralis group   Alpha hemolytic streptoccous (Not Streptococcus pneumoniae or   Enterococcus)   isolated from a single blood culture set may represent contamination.   Contact the Microbiology Department at [phone] if susceptibility   testing is clinically indicated.   Growth in anaerobic bottle: Gram positive cocci in pairs   Direct identification is available within approximately 3-5   hours either by Blood Panel Multiplexed PCR or Direct   MALDI-TOF. Details: [email or web address]  Organism Identification: Blood Culture PCR  Method Type: PCR  Culture - Blood (10.31.23 @ 15:27)   Gram Stain:   Growth in aerobic bottle: Gram positive cocci in pairs   Growth in anaerobic bottle: Gram positive cocci in pairs  Specimen Source: .Blood None  Culture Results:   Growth in aerobic bottle: Gram positive cocci in pairs   Growth in anaerobic bottle: Gram positive cocci in pairsCulture - Blood (10.31.23 @ 15:27)   Gram Stain:   Growth in aerobic bottle: Gram positive cocci in pairs   Growth in anaerobic bottle: Gram positive cocci in pairs  Specimen Source: .Blood None  Culture Results:   Growth in aerobic bottle: Gram positive cocci in pairs   Growth in anaerobic bottle: Gram positive cocci in pairsCulture - Tissue with Gram Stain (11.01.23 @ 12:19)   Gram Stain:   Few polymorphonuclear leukocytes per low power field   Few Gram positive cocci in pairs per oil power field  Specimen Source: .Tissue RIGHT GREAT TOE PROXIMAL PHALANX BONE CX    
Patient is a 63y old  Female who presents with a chief complaint of foot ulcer (02 Nov 2023 14:09)      HPI:  HPI:  63F w/no sig PMH, presents c/o infection of Right first toe w/ malodorous drainage occurring over past several weeks. She's been sent in by her podiatrist for IV abx for osteomyelitis. Pt denies any pain, fever/chills, n/v/d, abd pain, dysuria, cp, sob.  Pt states this started after getting a pedicure. She has not been to any doctor for many years.     In ED, given zosyn, vanco, seen by podiatry (note reviewed), gluc 325, Na 129, temp 99, HR 100s, sbp 140s  MRI Rt foot 1.  Extensive osseous destruction of the 1st toe is again seen consistent with osteomyelitis. 2.  Faint marrow edema of the 1st metatarsal head and hallux sesamoids   may be reactive in the absence of confluent T1 marrow replacement, however, early infection cannot be absolutely excluded.    LE doppler b/l NEG for dvt    PAST MEDICAL & SURGICAL HISTORY:  no sig PMH/PSH    Review of Systems:   CONSTITUTIONAL: No fever.  EYES: No eye pain or discharge.  ENMT:  No sinus or throat pain  NECK: No pain or stiffness  RESPIRATORY: No cough, wheezing, chills or hemoptysis; No shortness of breath  CARDIOVASCULAR: No chest pain, palpitations, dizziness, or leg swelling  GASTROINTESTINAL: No abdominal or epigastric pain. No nausea, vomiting, or hematemesis; No diarrhea or constipation. No melena or hematochezia.  GENITOURINARY: No dysuria or incontinence  NEUROLOGICAL: No headaches, memory loss, loss of strength, numbness, or tremors  SKIN: No rashes.  MUSCULOSKELETAL: No joint pain or swelling; No muscle, back, or extremity pain ++ see hpi   PSYCHIATRIC: No depression, anxiety, mood swings, or difficulty sleeping    Social History:   denies smoking/etoh/drug use  lives alone, ind ADLs    FAMILY HISTORY:  pt denies       MEDICATIONS  (STANDING):  dextrose 5%. 1000 milliLiter(s) (100 mL/Hr) IV Continuous <Continuous>  dextrose 5%. 1000 milliLiter(s) (50 mL/Hr) IV Continuous <Continuous>  dextrose 50% Injectable 12.5 Gram(s) IV Push once  dextrose 50% Injectable 25 Gram(s) IV Push once  dextrose 50% Injectable 25 Gram(s) IV Push once  glucagon  Injectable 1 milliGRAM(s) IntraMuscular once  insulin glargine Injectable (LANTUS) 8 Unit(s) SubCutaneous at bedtime  insulin lispro (ADMELOG) corrective regimen sliding scale   SubCutaneous at bedtime  insulin lispro (ADMELOG) corrective regimen sliding scale   SubCutaneous three times a day before meals  piperacillin/tazobactam IVPB.. 3.375 Gram(s) IV Intermittent once    MEDICATIONS  (PRN):  acetaminophen     Tablet .. 650 milliGRAM(s) Oral every 6 hours PRN Temp greater or equal to 38C (100.4F), Mild Pain (1 - 3)  aluminum hydroxide/magnesium hydroxide/simethicone Suspension 30 milliLiter(s) Oral every 4 hours PRN Dyspepsia  dextrose Oral Gel 15 Gram(s) Oral once PRN Blood Glucose LESS THAN 70 milliGRAM(s)/deciliter  melatonin 3 milliGRAM(s) Oral at bedtime PRN Insomnia  ondansetron Injectable 4 milliGRAM(s) IV Push every 8 hours PRN Nausea and/or Vomiting      PHYSICAL EXAM:  Vital Signs Last 24 Hrs  T(C): 37.4 (31 Oct 2023 18:12), Max: 37.4 (31 Oct 2023 18:12)  T(F): 99.3 (31 Oct 2023 18:12), Max: 99.3 (31 Oct 2023 18:12)  HR: 94 (31 Oct 2023 18:12) (94 - 104)  BP: 144/69 (31 Oct 2023 18:12) (140/72 - 144/69)  BP(mean): 88 (31 Oct 2023 13:49) (88 - 88)  RR: 18 (31 Oct 2023 18:12) (18 - 18)  SpO2: 96% (31 Oct 2023 18:12) (96% - 98%)    Parameters below as of 31 Oct 2023 18:12  Patient On (Oxygen Delivery Method): room air  GENERAL: NAD,   HEAD:  Atraumatic, Normocephalic  EYES: EOMI, PERRLA, conjunctiva and sclera clear  ENT: normal hearing, no nasal discharge, throat clear, dentition normal  NECK: Supple, No JVD, no LAD, no thyromegaly   CHEST/LUNG: Clear to auscultation bilaterally; No wheeze, respirations unlabored  HEART: Regular rate and rhythm; No murmurs, rubs, or gallops  ABDOMEN: Soft, Nontender, Nondistended; Bowel sounds present, no HSM  EXTREMITIES:  dec Peripheral Pulses of LE, No clubbing, cyanosis, +mild  edema RLE, RT first toe w/ swelling/erythema/draining ulcer  PSYCH: AAOx3, normal behavior  NEUROLOGY: non-focal, sensory and cn 2-12 intact, speech/language intact  SKIN: No visible rashes or lesions    LABS:                        12.4   16.24 )-----------( 313      ( 31 Oct 2023 15:27 )             37.2     10-31    129<L>  |  95<L>  |  12  ----------------------------<  325<H>  4.0   |  25  |  0.87    Ca    8.9      31 Oct 2023 15:27    TPro  7.4  /  Alb  2.4<L>  /  TBili  0.8  /  DBili  x   /  AST  12<L>  /  ALT  23  /  AlkPhos  111  10-31    PT/INR - ( 31 Oct 2023 15:27 )   PT: 13.7 sec;   INR: 1.22 ratio         PTT - ( 31 Oct 2023 15:27 )  PTT:31.7 sec      Urinalysis Basic - ( 31 Oct 2023 15:27 )    Color: x / Appearance: x / SG: x / pH: x  Gluc: 325 mg/dL / Ketone: x  / Bili: x / Urobili: x   Blood: x / Protein: x / Nitrite: x   Leuk Esterase: x / RBC: x / WBC x   Sq Epi: x / Non Sq Epi: x / Bacteria: x        RADIOLOGY & ADDITIONAL TESTS:    Imaging Personally Reviewed:  MRI Rt foot 1.  Extensive osseous destruction of the 1st toe is again seen consistent with osteomyelitis. 2.  Faint marrow edema of the 1st metatarsal head and hallux sesamoids   may be reactive in the absence of confluent T1 marrow replacement, however, early infection cannot be absolutely excluded.    LE doppler b/l NEG for dvt    EKG Personally Reviewed:    Consultant(s) Notes Reviewed:      Care Discussed with Consultants/Other Providers:   (31 Oct 2023 19:32)      Allergies    No Known Allergies    Intolerances        MEDICATIONS  (STANDING):  cefTRIAXone Injectable. 2000 milliGRAM(s) IV Push every 24 hours  dextrose 5%. 1000 milliLiter(s) (50 mL/Hr) IV Continuous <Continuous>  dextrose 5%. 1000 milliLiter(s) (100 mL/Hr) IV Continuous <Continuous>  dextrose 50% Injectable 25 Gram(s) IV Push once  dextrose 50% Injectable 12.5 Gram(s) IV Push once  dextrose 50% Injectable 25 Gram(s) IV Push once  glucagon  Injectable 1 milliGRAM(s) IntraMuscular once  insulin glargine Injectable (LANTUS) 12 Unit(s) SubCutaneous at bedtime  insulin lispro (ADMELOG) corrective regimen sliding scale   SubCutaneous at bedtime  insulin lispro (ADMELOG) corrective regimen sliding scale   SubCutaneous three times a day before meals  sodium chloride 0.9% lock flush 3 milliLiter(s) IV Push every 8 hours  vancomycin  IVPB 1000 milliGRAM(s) IV Intermittent every 12 hours    MEDICATIONS  (PRN):  acetaminophen     Tablet .. 650 milliGRAM(s) Oral every 6 hours PRN Temp greater or equal to 38C (100.4F), Mild Pain (1 - 3)  aluminum hydroxide/magnesium hydroxide/simethicone Suspension 30 milliLiter(s) Oral every 4 hours PRN Dyspepsia  dextrose Oral Gel 15 Gram(s) Oral once PRN Blood Glucose LESS THAN 70 milliGRAM(s)/deciliter  melatonin 3 milliGRAM(s) Oral at bedtime PRN Insomnia  ondansetron Injectable 4 milliGRAM(s) IV Push every 8 hours PRN Nausea and/or Vomiting        RADIOLOGY    CBC Full  -  ( 01 Nov 2023 08:04 )  WBC Count : 12.47 K/uL  RBC Count : 3.76 M/uL  Hemoglobin : 11.1 g/dL  Hematocrit : 32.8 %  Platelet Count - Automated : 249 K/uL  Mean Cell Volume : 87.2 fl  Mean Cell Hemoglobin : 29.5 pg  Mean Cell Hemoglobin Concentration : 33.8 gm/dL  Auto Neutrophil # : 9.60 K/uL  Auto Lymphocyte # : 1.63 K/uL  Auto Monocyte # : 1.01 K/uL  Auto Eosinophil # : 0.07 K/uL  Auto Basophil # : 0.07 K/uL  Auto Neutrophil % : 76.9 %  Auto Lymphocyte % : 13.1 %  Auto Monocyte % : 8.1 %  Auto Eosinophil % : 0.6 %  Auto Basophil % : 0.6 %      11-01    136  |  100  |  9   ----------------------------<  246<H>  3.4<L>   |  28  |  0.59    Ca    8.5      01 Nov 2023 08:04    TPro  5.9<L>  /  Alb  1.9<L>  /  TBili  0.6  /  DBili  x   /  AST  12<L>  /  ALT  18  /  AlkPhos  96  11-01      < from: Xray Foot AP + Lateral, Right (11.01.23 @ 13:58) >    ACC: [id number] EXAM:  XR FOOT 2 VIEWS RT   ORDERED BY: KATY DAILEY     PROCEDURE DATE:  11/01/2023          INTERPRETATION:  Clinical history: 63-year-old female osteomyelitis.    Two views of the right foot are correlated with the MRI of 10/31/2023.    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    Soft tissue swelling in the forefoot with no other acute findings.    IMPRESSION:    Unremarkable postoperative views    --- End of Report ---            RIVKA LEE DO; Attending Radiologist  This document has been electronically signed. Nov 1 2023  3:51PM    < end of copied text >  < from: US Duplex Arterial Lower Ext Compl, Bilateral (11.01.23 @ 19:10) >  ACC: [id number] EXAM:  US DPLX LWR EXT ARTS COMPL BI   ORDERED BY:   VANIA ALONZO     PROCEDURE DATE:  11/01/2023          INTERPRETATION:  CLINICAL INFORMATION: Nonhealing foot wounds.    COMPARISON: None available.    TECHNIQUE: Grayscale, color and spectral Doppler imaging was performed at   the arteries of BILATERAL lower extremities.    FINDINGS:    Plaque Description: Mild calcific plaque.    Waveform Morphology: Biphasic and triphasic arterial waveforms are   present.    Stenosis/Occlusion: The arteries are patent. No disturbed color-flow or   elevated blood flow velocities are encountered in the femoropopliteal   circulation. There are moderate stenoses of the right mid posterior   tibial artery and both dorsalis pedis arteries.    The peak systolic velocities of the RIGHT lower extremity are as follows:    Common femoral artery: 170 cm/s  Deep femoral artery: 108 cm/s  Superficial femoral artery: 135 cm/s proximal,  140 cm/s mid, 146 cm/s   distal  Popliteal artery: 144 cm/s  Posterior tibial artery: 58 cm/s proximal,  161 cm/s mid, 79 cm/s distal  Peroneal artery: 134 cm/s proximal,  124 cm/s mid, 54 cm/s distal  Anterior tibial artery: 107 cm/s  Dorsalis pedis artery: 174 cm/s    The peak systolic velocities of the LEFT lower extremity are as follows:    Common femoral artery: 111 cm/s  Deep femoral artery: 99 cm/s  Superficial femoral artery: 123 cm/s proximal,  140 cm/s mid, 114 cm/s   distal  Popliteal artery: 105 cm/s  Posterior tibial artery: 35 cm/s proximal,  82 cm/s mid, 54 cm/s distal  Peroneal artery: 85 cm/s proximal,  72 cm/s mid, 38 cm/s distal  Anterior tibial artery: 98 cm/s as  Dorsalis pedis artery: 153 cm/s      IMPRESSION: Mild calcific plaque without duplex evidence of   hemodynamically significant stenosis or occlusion in the femoropopliteal   circulation of either lower extremity.    Moderate stenoses of the right posterior tibial and bilateral dorsalis   pedis arteries.        --- End of Report ---            FIDEL GREENBERG MD; Attending Radiologist  This document has been electronically signed. Nov 2 2023 12:12PM    < end of copied text >  < from: US Duplex Venous Lower Ext Complete, Bilateral (10.31.23 @ 15:21) >    ACC: [id number] EXAM:  US DPLX LWR EXT VEINS COMPL BI   ORDERED BY: VIRGILIO JASON     PROCEDURE DATE:  10/31/2023          INTERPRETATION:  CLINICAL INFORMATION: Lower extremity swelling    COMPARISON: None available.    TECHNIQUE: Duplex sonography of theBILATERAL LOWER extremity veins with   color and spectral Doppler, with and without compression.    FINDINGS:    RIGHT: Subcutaneous edema, greater distally.  The area of concern as indicated by the patient corresponds with the foot   there is superficial edema of the soft tissues.  Great saphenous vein at the level of the lower thigh is associated with a   thickened valve    Normal compressibility of the RIGHT common femoral, femoral and popliteal   veins.  Doppler examination shows normal spontaneous and phasic flow.  No RIGHT calf vein thrombosis is detected involving gastrocnemius veins.  Remaining calf veins were not able to be diagnostically visualized.    LEFT:  Normal compressibility of the LEFT common femoral, femoral and popliteal   veins.  Doppler examination shows normal spontaneous and phasic flow.  No LEFT calf vein thrombosis is detected.    IMPRESSION:  No evidence of deep venous thrombosis in either lower extremity.  Statement above in regards to the venous segments able to be   diagnostically examined as described.            --- End of Report ---            ENRIQUE MOE MD; Attending Radiologist  This document has been electronically signed. Oct 31 2023  3:27PM    < end of copied text >  Culture - Blood (10.31.23 @ 15:39)   - Streptococcus sp. (Not Grp A, B or S pneumoniae): Detec  Gram Stain:   Growth in aerobic bottle: Gram positive cocci in pairs   Growth in anaerobic bottle: Gram positive cocci in pairs  Specimen Source: .Blood None  Organism: Blood Culture PCR  Culture Results:   Growth in aerobic and anaerobic bottles: Streptococcus mitis/oralis group   "Susceptibilities not performed"   Direct identification is available within approximately 3-5   hours either by Blood Panel Multiplexed PCR or Direct   MALDI-TOF. Details: [email or web address]  Organism Identification: Blood Culture PCR  Method Type: PCR      Historical Values  Culture - Blood (10.31.23 @ 15:39)   - Streptococcus sp. (Not Grp A, B or S pneumoniae): Detec  Gram Stain:   Growth in aerobic bottle: Gram positive cocci in pairs   Growth in anaerobic bottle: Gram positive cocci in pairs  Specimen Source: .Blood None  Organism: Blood Culture PCR  Culture Results:   Growth in aerobic and anaerobic bottles: Streptococcus mitis/oralis group   "Susceptibilities not performed"   Direct identification is available within approximately 3-5   hours either by Blood Panel Multiplexed PCR or Direct   MALDI-TOF. Details: [email or web address]  Organism Identification: Blood Culture PCR  Method Type: PCR  Culture - Blood (10.31.23 @ 15:27)   Gram Stain:   Growth in aerobic bottle: Gram positive cocci in pairs   Growth in anaerobic bottle: Gram positive cocci in pairs  Specimen Source: .Blood None  Culture Results:   Growth in aerobic and anaerobic bottles: Streptococcus mitis/oralis group   Susceptibility to follow.< from: TTE Echo Complete w/o Contrast w/ Doppler (11.01.23 @ 10:56) >      
Patient is a 63y old  Female who presents with a chief complaint of foot ulcer (05 Nov 2023 18:03)      HPI:  HPI:  63F w/no sig PMH, presents c/o infection of Right first toe w/ malodorous drainage occurring over past several weeks. She's been sent in by her podiatrist for IV abx for osteomyelitis. Pt denies any pain, fever/chills, n/v/d, abd pain, dysuria, cp, sob.  Pt states this started after getting a pedicure. She has not been to any doctor for many years.     In ED, given zosyn, vanco, seen by podiatry (note reviewed), gluc 325, Na 129, temp 99, HR 100s, sbp 140s  MRI Rt foot 1.  Extensive osseous destruction of the 1st toe is again seen consistent with osteomyelitis. 2.  Faint marrow edema of the 1st metatarsal head and hallux sesamoids   may be reactive in the absence of confluent T1 marrow replacement, however, early infection cannot be absolutely excluded.    LE doppler b/l NEG for dvt    PAST MEDICAL & SURGICAL HISTORY:  no sig PMH/PSH    Review of Systems:   CONSTITUTIONAL: No fever.  EYES: No eye pain or discharge.  ENMT:  No sinus or throat pain  NECK: No pain or stiffness  RESPIRATORY: No cough, wheezing, chills or hemoptysis; No shortness of breath  CARDIOVASCULAR: No chest pain, palpitations, dizziness, or leg swelling  GASTROINTESTINAL: No abdominal or epigastric pain. No nausea, vomiting, or hematemesis; No diarrhea or constipation. No melena or hematochezia.  GENITOURINARY: No dysuria or incontinence  NEUROLOGICAL: No headaches, memory loss, loss of strength, numbness, or tremors  SKIN: No rashes.  MUSCULOSKELETAL: No joint pain or swelling; No muscle, back, or extremity pain ++ see hpi   PSYCHIATRIC: No depression, anxiety, mood swings, or difficulty sleeping    Social History:   denies smoking/etoh/drug use  lives alone, ind ADLs    FAMILY HISTORY:  pt denies       MEDICATIONS  (STANDING):  dextrose 5%. 1000 milliLiter(s) (100 mL/Hr) IV Continuous <Continuous>  dextrose 5%. 1000 milliLiter(s) (50 mL/Hr) IV Continuous <Continuous>  dextrose 50% Injectable 12.5 Gram(s) IV Push once  dextrose 50% Injectable 25 Gram(s) IV Push once  dextrose 50% Injectable 25 Gram(s) IV Push once  glucagon  Injectable 1 milliGRAM(s) IntraMuscular once  insulin glargine Injectable (LANTUS) 8 Unit(s) SubCutaneous at bedtime  insulin lispro (ADMELOG) corrective regimen sliding scale   SubCutaneous at bedtime  insulin lispro (ADMELOG) corrective regimen sliding scale   SubCutaneous three times a day before meals  piperacillin/tazobactam IVPB.. 3.375 Gram(s) IV Intermittent once    MEDICATIONS  (PRN):  acetaminophen     Tablet .. 650 milliGRAM(s) Oral every 6 hours PRN Temp greater or equal to 38C (100.4F), Mild Pain (1 - 3)  aluminum hydroxide/magnesium hydroxide/simethicone Suspension 30 milliLiter(s) Oral every 4 hours PRN Dyspepsia  dextrose Oral Gel 15 Gram(s) Oral once PRN Blood Glucose LESS THAN 70 milliGRAM(s)/deciliter  melatonin 3 milliGRAM(s) Oral at bedtime PRN Insomnia  ondansetron Injectable 4 milliGRAM(s) IV Push every 8 hours PRN Nausea and/or Vomiting      PHYSICAL EXAM:  Vital Signs Last 24 Hrs  T(C): 37.4 (31 Oct 2023 18:12), Max: 37.4 (31 Oct 2023 18:12)  T(F): 99.3 (31 Oct 2023 18:12), Max: 99.3 (31 Oct 2023 18:12)  HR: 94 (31 Oct 2023 18:12) (94 - 104)  BP: 144/69 (31 Oct 2023 18:12) (140/72 - 144/69)  BP(mean): 88 (31 Oct 2023 13:49) (88 - 88)  RR: 18 (31 Oct 2023 18:12) (18 - 18)  SpO2: 96% (31 Oct 2023 18:12) (96% - 98%)    Parameters below as of 31 Oct 2023 18:12  Patient On (Oxygen Delivery Method): room air  GENERAL: NAD,   HEAD:  Atraumatic, Normocephalic  EYES: EOMI, PERRLA, conjunctiva and sclera clear  ENT: normal hearing, no nasal discharge, throat clear, dentition normal  NECK: Supple, No JVD, no LAD, no thyromegaly   CHEST/LUNG: Clear to auscultation bilaterally; No wheeze, respirations unlabored  HEART: Regular rate and rhythm; No murmurs, rubs, or gallops  ABDOMEN: Soft, Nontender, Nondistended; Bowel sounds present, no HSM  EXTREMITIES:  dec Peripheral Pulses of LE, No clubbing, cyanosis, +mild  edema RLE, RT first toe w/ swelling/erythema/draining ulcer  PSYCH: AAOx3, normal behavior  NEUROLOGY: non-focal, sensory and cn 2-12 intact, speech/language intact  SKIN: No visible rashes or lesions    LABS:                        12.4   16.24 )-----------( 313      ( 31 Oct 2023 15:27 )             37.2     10-31    129<L>  |  95<L>  |  12  ----------------------------<  325<H>  4.0   |  25  |  0.87    Ca    8.9      31 Oct 2023 15:27    TPro  7.4  /  Alb  2.4<L>  /  TBili  0.8  /  DBili  x   /  AST  12<L>  /  ALT  23  /  AlkPhos  111  10-31    PT/INR - ( 31 Oct 2023 15:27 )   PT: 13.7 sec;   INR: 1.22 ratio         PTT - ( 31 Oct 2023 15:27 )  PTT:31.7 sec      Urinalysis Basic - ( 31 Oct 2023 15:27 )    Color: x / Appearance: x / SG: x / pH: x  Gluc: 325 mg/dL / Ketone: x  / Bili: x / Urobili: x   Blood: x / Protein: x / Nitrite: x   Leuk Esterase: x / RBC: x / WBC x   Sq Epi: x / Non Sq Epi: x / Bacteria: x        RADIOLOGY & ADDITIONAL TESTS:    Imaging Personally Reviewed:  MRI Rt foot 1.  Extensive osseous destruction of the 1st toe is again seen consistent with osteomyelitis. 2.  Faint marrow edema of the 1st metatarsal head and hallux sesamoids   may be reactive in the absence of confluent T1 marrow replacement, however, early infection cannot be absolutely excluded.    LE doppler b/l NEG for dvt    EKG Personally Reviewed:    Consultant(s) Notes Reviewed:      Care Discussed with Consultants/Other Providers:   (31 Oct 2023 19:32)      Allergies    No Known Allergies    Intolerances        MEDICATIONS  (STANDING):  atorvastatin 10 milliGRAM(s) Oral at bedtime  cefTRIAXone Injectable. 2000 milliGRAM(s) IV Push every 24 hours  dextrose 5%. 1000 milliLiter(s) (50 mL/Hr) IV Continuous <Continuous>  dextrose 5%. 1000 milliLiter(s) (100 mL/Hr) IV Continuous <Continuous>  dextrose 50% Injectable 25 Gram(s) IV Push once  dextrose 50% Injectable 12.5 Gram(s) IV Push once  dextrose 50% Injectable 25 Gram(s) IV Push once  glucagon  Injectable 1 milliGRAM(s) IntraMuscular once  insulin glargine Injectable (LANTUS) 16 Unit(s) SubCutaneous at bedtime  insulin lispro (ADMELOG) corrective regimen sliding scale   SubCutaneous three times a day before meals  insulin lispro (ADMELOG) corrective regimen sliding scale   SubCutaneous at bedtime  lactobacillus acidophilus 1 Tablet(s) Oral three times a day with meals  sodium chloride 0.9% lock flush 3 milliLiter(s) IV Push every 8 hours    MEDICATIONS  (PRN):  acetaminophen     Tablet .. 650 milliGRAM(s) Oral every 6 hours PRN Temp greater or equal to 38C (100.4F), Mild Pain (1 - 3)  aluminum hydroxide/magnesium hydroxide/simethicone Suspension 30 milliLiter(s) Oral every 4 hours PRN Dyspepsia  dextrose Oral Gel 15 Gram(s) Oral once PRN Blood Glucose LESS THAN 70 milliGRAM(s)/deciliter  melatonin 3 milliGRAM(s) Oral at bedtime PRN Insomnia  ondansetron Injectable 4 milliGRAM(s) IV Push every 8 hours PRN Nausea and/or Vomiting        RADIOLOGY                    
Patient is a 63y old  Female who presents with a chief complaint of foot ulcer (07 Nov 2023 15:24)      HPI:  HPI:  63F w/no sig PMH, presents c/o infection of Right first toe w/ malodorous drainage occurring over past several weeks. She's been sent in by her podiatrist for IV abx for osteomyelitis. Pt denies any pain, fever/chills, n/v/d, abd pain, dysuria, cp, sob.  Pt states this started after getting a pedicure. She has not been to any doctor for many years.     In ED, given zosyn, vanco, seen by podiatry (note reviewed), gluc 325, Na 129, temp 99, HR 100s, sbp 140s  MRI Rt foot 1.  Extensive osseous destruction of the 1st toe is again seen consistent with osteomyelitis. 2.  Faint marrow edema of the 1st metatarsal head and hallux sesamoids   may be reactive in the absence of confluent T1 marrow replacement, however, early infection cannot be absolutely excluded.    LE doppler b/l NEG for dvt    PAST MEDICAL & SURGICAL HISTORY:  no sig PMH/PSH    Review of Systems:   CONSTITUTIONAL: No fever.  EYES: No eye pain or discharge.  ENMT:  No sinus or throat pain  NECK: No pain or stiffness  RESPIRATORY: No cough, wheezing, chills or hemoptysis; No shortness of breath  CARDIOVASCULAR: No chest pain, palpitations, dizziness, or leg swelling  GASTROINTESTINAL: No abdominal or epigastric pain. No nausea, vomiting, or hematemesis; No diarrhea or constipation. No melena or hematochezia.  GENITOURINARY: No dysuria or incontinence  NEUROLOGICAL: No headaches, memory loss, loss of strength, numbness, or tremors  SKIN: No rashes.  MUSCULOSKELETAL: No joint pain or swelling; No muscle, back, or extremity pain ++ see hpi   PSYCHIATRIC: No depression, anxiety, mood swings, or difficulty sleeping    Social History:   denies smoking/etoh/drug use  lives alone, ind ADLs    FAMILY HISTORY:  pt denies       MEDICATIONS  (STANDING):  dextrose 5%. 1000 milliLiter(s) (100 mL/Hr) IV Continuous <Continuous>  dextrose 5%. 1000 milliLiter(s) (50 mL/Hr) IV Continuous <Continuous>  dextrose 50% Injectable 12.5 Gram(s) IV Push once  dextrose 50% Injectable 25 Gram(s) IV Push once  dextrose 50% Injectable 25 Gram(s) IV Push once  glucagon  Injectable 1 milliGRAM(s) IntraMuscular once  insulin glargine Injectable (LANTUS) 8 Unit(s) SubCutaneous at bedtime  insulin lispro (ADMELOG) corrective regimen sliding scale   SubCutaneous at bedtime  insulin lispro (ADMELOG) corrective regimen sliding scale   SubCutaneous three times a day before meals  piperacillin/tazobactam IVPB.. 3.375 Gram(s) IV Intermittent once    MEDICATIONS  (PRN):  acetaminophen     Tablet .. 650 milliGRAM(s) Oral every 6 hours PRN Temp greater or equal to 38C (100.4F), Mild Pain (1 - 3)  aluminum hydroxide/magnesium hydroxide/simethicone Suspension 30 milliLiter(s) Oral every 4 hours PRN Dyspepsia  dextrose Oral Gel 15 Gram(s) Oral once PRN Blood Glucose LESS THAN 70 milliGRAM(s)/deciliter  melatonin 3 milliGRAM(s) Oral at bedtime PRN Insomnia  ondansetron Injectable 4 milliGRAM(s) IV Push every 8 hours PRN Nausea and/or Vomiting      PHYSICAL EXAM:  Vital Signs Last 24 Hrs  T(C): 37.4 (31 Oct 2023 18:12), Max: 37.4 (31 Oct 2023 18:12)  T(F): 99.3 (31 Oct 2023 18:12), Max: 99.3 (31 Oct 2023 18:12)  HR: 94 (31 Oct 2023 18:12) (94 - 104)  BP: 144/69 (31 Oct 2023 18:12) (140/72 - 144/69)  BP(mean): 88 (31 Oct 2023 13:49) (88 - 88)  RR: 18 (31 Oct 2023 18:12) (18 - 18)  SpO2: 96% (31 Oct 2023 18:12) (96% - 98%)    Parameters below as of 31 Oct 2023 18:12  Patient On (Oxygen Delivery Method): room air  GENERAL: NAD,   HEAD:  Atraumatic, Normocephalic  EYES: EOMI, PERRLA, conjunctiva and sclera clear  ENT: normal hearing, no nasal discharge, throat clear, dentition normal  NECK: Supple, No JVD, no LAD, no thyromegaly   CHEST/LUNG: Clear to auscultation bilaterally; No wheeze, respirations unlabored  HEART: Regular rate and rhythm; No murmurs, rubs, or gallops  ABDOMEN: Soft, Nontender, Nondistended; Bowel sounds present, no HSM  EXTREMITIES:  dec Peripheral Pulses of LE, No clubbing, cyanosis, +mild  edema RLE, RT first toe w/ swelling/erythema/draining ulcer  PSYCH: AAOx3, normal behavior  NEUROLOGY: non-focal, sensory and cn 2-12 intact, speech/language intact  SKIN: No visible rashes or lesions    LABS:                        12.4   16.24 )-----------( 313      ( 31 Oct 2023 15:27 )             37.2     10-31    129<L>  |  95<L>  |  12  ----------------------------<  325<H>  4.0   |  25  |  0.87    Ca    8.9      31 Oct 2023 15:27    TPro  7.4  /  Alb  2.4<L>  /  TBili  0.8  /  DBili  x   /  AST  12<L>  /  ALT  23  /  AlkPhos  111  10-31    PT/INR - ( 31 Oct 2023 15:27 )   PT: 13.7 sec;   INR: 1.22 ratio         PTT - ( 31 Oct 2023 15:27 )  PTT:31.7 sec      Urinalysis Basic - ( 31 Oct 2023 15:27 )    Color: x / Appearance: x / SG: x / pH: x  Gluc: 325 mg/dL / Ketone: x  / Bili: x / Urobili: x   Blood: x / Protein: x / Nitrite: x   Leuk Esterase: x / RBC: x / WBC x   Sq Epi: x / Non Sq Epi: x / Bacteria: x        RADIOLOGY & ADDITIONAL TESTS:    Imaging Personally Reviewed:  MRI Rt foot 1.  Extensive osseous destruction of the 1st toe is again seen consistent with osteomyelitis. 2.  Faint marrow edema of the 1st metatarsal head and hallux sesamoids   may be reactive in the absence of confluent T1 marrow replacement, however, early infection cannot be absolutely excluded.    LE doppler b/l NEG for dvt    EKG Personally Reviewed:    Consultant(s) Notes Reviewed:      Care Discussed with Consultants/Other Providers:   (31 Oct 2023 19:32)      Allergies    No Known Allergies    Intolerances        MEDICATIONS  (STANDING):  atorvastatin 10 milliGRAM(s) Oral at bedtime  cefTRIAXone Injectable. 2000 milliGRAM(s) IV Push every 24 hours  dextrose 5%. 1000 milliLiter(s) (50 mL/Hr) IV Continuous <Continuous>  dextrose 5%. 1000 milliLiter(s) (100 mL/Hr) IV Continuous <Continuous>  dextrose 50% Injectable 12.5 Gram(s) IV Push once  dextrose 50% Injectable 25 Gram(s) IV Push once  dextrose 50% Injectable 25 Gram(s) IV Push once  glucagon  Injectable 1 milliGRAM(s) IntraMuscular once  insulin glargine Injectable (LANTUS) 16 Unit(s) SubCutaneous at bedtime  insulin lispro (ADMELOG) corrective regimen sliding scale   SubCutaneous at bedtime  insulin lispro (ADMELOG) corrective regimen sliding scale   SubCutaneous three times a day before meals  lactobacillus acidophilus 1 Tablet(s) Oral three times a day with meals  sodium chloride 0.9% lock flush 3 milliLiter(s) IV Push every 8 hours    MEDICATIONS  (PRN):  acetaminophen     Tablet .. 650 milliGRAM(s) Oral every 6 hours PRN Temp greater or equal to 38C (100.4F), Mild Pain (1 - 3)  aluminum hydroxide/magnesium hydroxide/simethicone Suspension 30 milliLiter(s) Oral every 4 hours PRN Dyspepsia  dextrose Oral Gel 15 Gram(s) Oral once PRN Blood Glucose LESS THAN 70 milliGRAM(s)/deciliter  melatonin 3 milliGRAM(s) Oral at bedtime PRN Insomnia  ondansetron Injectable 4 milliGRAM(s) IV Push every 8 hours PRN Nausea and/or Vomiting        RADIOLOGY    CBC Full  -  ( 07 Nov 2023 08:32 )  WBC Count : 6.05 K/uL  RBC Count : 4.36 M/uL  Hemoglobin : 12.6 g/dL  Hematocrit : 38.9 %  Platelet Count - Automated : 317 K/uL  Mean Cell Volume : 89.2 fl  Mean Cell Hemoglobin : 28.9 pg  Mean Cell Hemoglobin Concentration : 32.4 gm/dL  Auto Neutrophil # : x  Auto Lymphocyte # : x  Auto Monocyte # : x  Auto Eosinophil # : x  Auto Basophil # : x  Auto Neutrophil % : x  Auto Lymphocyte % : x  Auto Monocyte % : x  Auto Eosinophil % : x  Auto Basophil % : x      11-07    141  |  106  |  9   ----------------------------<  135<H>  3.6   |  30  |  0.62    Ca    9.2      07 Nov 2023 08:32      Culture - Blood in AM (11.02.23 @ 08:47)   Specimen Source: .Blood None  Culture Results:   No growth at 5 days< from: Xray Chest 1 View- PORTABLE-Urgent (Xray Chest 1 View- PORTABLE-Urgent .) (11.06.23 @ 12:32) >  ACC: [id number] EXAM:  XR CHEST PORTABLE URGENT 1V   ORDERED BY: VINOD CALLAHAN     PROCEDURE DATE:  11/06/2023          INTERPRETATION:  CLINICAL INFORMATION: Line placement    AP chest radiograph from 1157 hours    COMPARISON: October 31, 2023    FINDINGS: Right arm PICC catheter with tip at the cavoatrial junction.   Lungs are clear. No pneumothorax. Cardiac and mediastinal contours are   unremarkable.    IMPRESSION:    Right arm PICC catheter with tip at the cavoatrial junction.    --- End of Report ---            MACHO SALAZAR MD; Attending Radiologist  This document has been electronically signed. Nov 6 2023  1:55PM    < end of copied text >  < from: Xray Foot AP + Lateral, Right (11.01.23 @ 13:58) >    ACC: [id number] EXAM:  XR FOOT 2 VIEWS RT   ORDERED BY: KATY DAILEY     PROCEDURE DATE:  11/01/2023          INTERPRETATION:  Clinical history: 63-year-old female osteomyelitis.    Two views of the right foot are correlated with the MRI of 10/31/2023.    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    Soft tissue swelling in the forefoot with no other acute findings.    IMPRESSION:    Unremarkable postoperative views    --- End of Report ---            RIVKA LEE DO; Attending Radiologist  This document has been electronically signed. Nov 1 2023  3:51PM    < end of copied text >        
Patient is a 63y old  Female who presents with a chief complaint of foot ulcer (08 Nov 2023 15:44)      HPI:  HPI:  63F w/no sig PMH, presents c/o infection of Right first toe w/ malodorous drainage occurring over past several weeks. She's been sent in by her podiatrist for IV abx for osteomyelitis. Pt denies any pain, fever/chills, n/v/d, abd pain, dysuria, cp, sob.  Pt states this started after getting a pedicure. She has not been to any doctor for many years.     In ED, given zosyn, vanco, seen by podiatry (note reviewed), gluc 325, Na 129, temp 99, HR 100s, sbp 140s  MRI Rt foot 1.  Extensive osseous destruction of the 1st toe is again seen consistent with osteomyelitis. 2.  Faint marrow edema of the 1st metatarsal head and hallux sesamoids   may be reactive in the absence of confluent T1 marrow replacement, however, early infection cannot be absolutely excluded.    LE doppler b/l NEG for dvt    PAST MEDICAL & SURGICAL HISTORY:  no sig PMH/PSH    Review of Systems:   CONSTITUTIONAL: No fever.  EYES: No eye pain or discharge.  ENMT:  No sinus or throat pain  NECK: No pain or stiffness  RESPIRATORY: No cough, wheezing, chills or hemoptysis; No shortness of breath  CARDIOVASCULAR: No chest pain, palpitations, dizziness, or leg swelling  GASTROINTESTINAL: No abdominal or epigastric pain. No nausea, vomiting, or hematemesis; No diarrhea or constipation. No melena or hematochezia.  GENITOURINARY: No dysuria or incontinence  NEUROLOGICAL: No headaches, memory loss, loss of strength, numbness, or tremors  SKIN: No rashes.  MUSCULOSKELETAL: No joint pain or swelling; No muscle, back, or extremity pain ++ see hpi   PSYCHIATRIC: No depression, anxiety, mood swings, or difficulty sleeping    Social History:   denies smoking/etoh/drug use  lives alone, ind ADLs    FAMILY HISTORY:  pt denies       MEDICATIONS  (STANDING):  dextrose 5%. 1000 milliLiter(s) (100 mL/Hr) IV Continuous <Continuous>  dextrose 5%. 1000 milliLiter(s) (50 mL/Hr) IV Continuous <Continuous>  dextrose 50% Injectable 12.5 Gram(s) IV Push once  dextrose 50% Injectable 25 Gram(s) IV Push once  dextrose 50% Injectable 25 Gram(s) IV Push once  glucagon  Injectable 1 milliGRAM(s) IntraMuscular once  insulin glargine Injectable (LANTUS) 8 Unit(s) SubCutaneous at bedtime  insulin lispro (ADMELOG) corrective regimen sliding scale   SubCutaneous at bedtime  insulin lispro (ADMELOG) corrective regimen sliding scale   SubCutaneous three times a day before meals  piperacillin/tazobactam IVPB.. 3.375 Gram(s) IV Intermittent once    MEDICATIONS  (PRN):  acetaminophen     Tablet .. 650 milliGRAM(s) Oral every 6 hours PRN Temp greater or equal to 38C (100.4F), Mild Pain (1 - 3)  aluminum hydroxide/magnesium hydroxide/simethicone Suspension 30 milliLiter(s) Oral every 4 hours PRN Dyspepsia  dextrose Oral Gel 15 Gram(s) Oral once PRN Blood Glucose LESS THAN 70 milliGRAM(s)/deciliter  melatonin 3 milliGRAM(s) Oral at bedtime PRN Insomnia  ondansetron Injectable 4 milliGRAM(s) IV Push every 8 hours PRN Nausea and/or Vomiting      PHYSICAL EXAM:  Vital Signs Last 24 Hrs  T(C): 37.4 (31 Oct 2023 18:12), Max: 37.4 (31 Oct 2023 18:12)  T(F): 99.3 (31 Oct 2023 18:12), Max: 99.3 (31 Oct 2023 18:12)  HR: 94 (31 Oct 2023 18:12) (94 - 104)  BP: 144/69 (31 Oct 2023 18:12) (140/72 - 144/69)  BP(mean): 88 (31 Oct 2023 13:49) (88 - 88)  RR: 18 (31 Oct 2023 18:12) (18 - 18)  SpO2: 96% (31 Oct 2023 18:12) (96% - 98%)    Parameters below as of 31 Oct 2023 18:12  Patient On (Oxygen Delivery Method): room air  GENERAL: NAD,   HEAD:  Atraumatic, Normocephalic  EYES: EOMI, PERRLA, conjunctiva and sclera clear  ENT: normal hearing, no nasal discharge, throat clear, dentition normal  NECK: Supple, No JVD, no LAD, no thyromegaly   CHEST/LUNG: Clear to auscultation bilaterally; No wheeze, respirations unlabored  HEART: Regular rate and rhythm; No murmurs, rubs, or gallops  ABDOMEN: Soft, Nontender, Nondistended; Bowel sounds present, no HSM  EXTREMITIES:  dec Peripheral Pulses of LE, No clubbing, cyanosis, +mild  edema RLE, RT first toe w/ swelling/erythema/draining ulcer  PSYCH: AAOx3, normal behavior  NEUROLOGY: non-focal, sensory and cn 2-12 intact, speech/language intact  SKIN: No visible rashes or lesions    LABS:                        12.4   16.24 )-----------( 313      ( 31 Oct 2023 15:27 )             37.2     10-31    129<L>  |  95<L>  |  12  ----------------------------<  325<H>  4.0   |  25  |  0.87    Ca    8.9      31 Oct 2023 15:27    TPro  7.4  /  Alb  2.4<L>  /  TBili  0.8  /  DBili  x   /  AST  12<L>  /  ALT  23  /  AlkPhos  111  10-31    PT/INR - ( 31 Oct 2023 15:27 )   PT: 13.7 sec;   INR: 1.22 ratio         PTT - ( 31 Oct 2023 15:27 )  PTT:31.7 sec      Urinalysis Basic - ( 31 Oct 2023 15:27 )    Color: x / Appearance: x / SG: x / pH: x  Gluc: 325 mg/dL / Ketone: x  / Bili: x / Urobili: x   Blood: x / Protein: x / Nitrite: x   Leuk Esterase: x / RBC: x / WBC x   Sq Epi: x / Non Sq Epi: x / Bacteria: x        RADIOLOGY & ADDITIONAL TESTS:    Imaging Personally Reviewed:  MRI Rt foot 1.  Extensive osseous destruction of the 1st toe is again seen consistent with osteomyelitis. 2.  Faint marrow edema of the 1st metatarsal head and hallux sesamoids   may be reactive in the absence of confluent T1 marrow replacement, however, early infection cannot be absolutely excluded.    LE doppler b/l NEG for dvt    EKG Personally Reviewed:    Consultant(s) Notes Reviewed:      Care Discussed with Consultants/Other Providers:   (31 Oct 2023 19:32)      Allergies    No Known Allergies    Intolerances        MEDICATIONS  (STANDING):  atorvastatin 10 milliGRAM(s) Oral at bedtime  cefTRIAXone Injectable. 2000 milliGRAM(s) IV Push every 24 hours  dextrose 5%. 1000 milliLiter(s) (50 mL/Hr) IV Continuous <Continuous>  dextrose 5%. 1000 milliLiter(s) (100 mL/Hr) IV Continuous <Continuous>  dextrose 50% Injectable 12.5 Gram(s) IV Push once  dextrose 50% Injectable 25 Gram(s) IV Push once  dextrose 50% Injectable 25 Gram(s) IV Push once  glucagon  Injectable 1 milliGRAM(s) IntraMuscular once  insulin glargine Injectable (LANTUS) 16 Unit(s) SubCutaneous at bedtime  insulin lispro (ADMELOG) corrective regimen sliding scale   SubCutaneous at bedtime  insulin lispro (ADMELOG) corrective regimen sliding scale   SubCutaneous three times a day before meals  lactobacillus acidophilus 1 Tablet(s) Oral three times a day with meals  sodium chloride 0.9% lock flush 3 milliLiter(s) IV Push every 8 hours    MEDICATIONS  (PRN):  acetaminophen     Tablet .. 650 milliGRAM(s) Oral every 6 hours PRN Temp greater or equal to 38C (100.4F), Mild Pain (1 - 3)  aluminum hydroxide/magnesium hydroxide/simethicone Suspension 30 milliLiter(s) Oral every 4 hours PRN Dyspepsia  dextrose Oral Gel 15 Gram(s) Oral once PRN Blood Glucose LESS THAN 70 milliGRAM(s)/deciliter  melatonin 3 milliGRAM(s) Oral at bedtime PRN Insomnia  ondansetron Injectable 4 milliGRAM(s) IV Push every 8 hours PRN Nausea and/or Vomiting        RADIOLOGY    CBC Full  -  ( 07 Nov 2023 08:32 )  WBC Count : 6.05 K/uL  RBC Count : 4.36 M/uL  Hemoglobin : 12.6 g/dL  Hematocrit : 38.9 %  Platelet Count - Automated : 317 K/uL  Mean Cell Volume : 89.2 fl  Mean Cell Hemoglobin : 28.9 pg  Mean Cell Hemoglobin Concentration : 32.4 gm/dL  Auto Neutrophil # : x  Auto Lymphocyte # : x  Auto Monocyte # : x  Auto Eosinophil # : x  Auto Basophil # : x  Auto Neutrophil % : x  Auto Lymphocyte % : x  Auto Monocyte % : x  Auto Eosinophil % : x  Auto Basophil % : x      11-07    141  |  106  |  9   ----------------------------<  135<H>  3.6   |  30  |  0.62    Ca    9.2      07 Nov 2023 08:32            
63 F w/no sig PMH, presents c/o infection of Right first toe w/ malodorous drainage occurring over past several weeks. She's been sent in by her podiatrist for IV abx for osteomyelitis. Pt denies any pain, fever/chills, n/v/d, abd pain, dysuria, cp, sob.  Pt states this started after getting a pedicure. She has not been to any doctor for many years.     In ED, given zosyn, vanco, seen by podiatry (note reviewed), gluc 325, Na 129, temp 99, HR 100s, sbp 140s  MRI Rt foot 1.  Extensive osseous destruction of the 1st toe is again seen consistent with osteomyelitis. 2.  Faint marrow edema of the 1st metatarsal head and hallux sesamoids   may be reactive in the absence of confluent T1 marrow replacement, however, early infection cannot be absolutely excluded.    LE doppler b/l NEG for dvt    11.01:  no cp, no sob  no exertional angina, no dyspnea  11.02: no distress, s/p Rt foot excisional debridement   11.03: pod#1, no pain, no dyspnea   11/04/23: No new issues. Discussed with patient regarding plan of care.  11/05/23: Patient denies any new complaints. Possible PICC tomorrow for longer IV antibiotic.        REVIEW OF SYSTEMS:    CONSTITUTIONAL: No weakness, No fevers or chills  ENT: No ear ache, No sorethroat  NECK: No pain, No stiffness  RESPIRATORY: No cough, No wheezing, No hemoptysis; No dyspnea  CARDIOVASCULAR: No chest pain, No palpitations  GASTROINTESTINAL: No abd pain, No nausea, No vomiting, No hematemesis, No diarrhea or constipation. No melena, No hematochezia.  GENITOURINARY: No dysuria, No  hematuria  NEUROLOGICAL: No diplopia, No paresthesia, No motor dysfunction  MUSCULOSKELETAL: No arthralgia, No myalgia  SKIN: No rashes, or lesions   PSYCH: no anxiety, no suicidal ideation    All other review of systems is negative unless indicated above        Vital Signs Last 24 Hrs  T(C): 36.7 (05 Nov 2023 07:56), Max: 36.7 (05 Nov 2023 07:56)  T(F): 98 (05 Nov 2023 07:56), Max: 98 (05 Nov 2023 07:56)  HR: 61 (05 Nov 2023 07:56) (61 - 79)  BP: 137/62 (05 Nov 2023 07:56) (137/62 - 152/75)  BP(mean): --  RR: 18 (05 Nov 2023 07:56) (18 - 18)  SpO2: 95% (05 Nov 2023 07:56) (95% - 97%)    Parameters below as of 05 Nov 2023 07:56  Patient On (Oxygen Delivery Method): room air        PHYSICAL EXAM:    GENERAL: NAD  HEENT:  NC/AT, EOMI, PERRLA, No scleral icterus, Moist mucous membranes  NECK: Supple, No JVD  CNS:  Alert & Oriented X3, Motor Strength 5/5 B/L upper and lower extremities; DTRs 2+ intact   LUNG: Normal Breath sounds, Clear to auscultation bilaterally, No rales, No rhonchi, No wheezing  HEART: RRR; No murmurs, No rubs  ABDOMEN: +BS, ST/ND/NT  GENITOURINARY: Voiding, Bladder not distended  EXTREMITIES: Rt foot bulky surgical dressing present   MUSCULOSKELTAL: Joints normal ROM, No TTP, No effusion  VAGINAL: deferred  SKIN: no rashes      CAPILLARY BLOOD GLUCOSE      POCT Blood Glucose.: 167 mg/dL (04 Nov 2023 12:10)  POCT Blood Glucose.: 127 mg/dL (04 Nov 2023 08:25)  POCT Blood Glucose.: 172 mg/dL (03 Nov 2023 20:49)  POCT Blood Glucose.: 212 mg/dL (03 Nov 2023 17:08)          MEDICATIONS  (STANDING):  cefTRIAXone Injectable. 2000 milliGRAM(s) IV Push every 24 hours  glucagon  Injectable 1 milliGRAM(s) IntraMuscular once  insulin glargine Injectable (LANTUS) 12 Unit(s) SubCutaneous at bedtime  insulin lispro (ADMELOG) corrective regimen sliding scale   SubCutaneous three times a day before meals  insulin lispro (ADMELOG) corrective regimen sliding scale   SubCutaneous at bedtime  sodium chloride 0.9% lock flush 3 milliLiter(s) IV Push every 8 hours      all labs reviewed  all imaging reviewed    a/p:    1. Right foot OM involving 1st MT and 1st Toe:  2. Sepsis due to Streptococcus Viridans  bacteremia  -s/p excisional debridement by Podiatry pod#2  local wound care per surgery  TTE: normal EF, mild-mod TR  no evidence of vegetations  c/w Ceftriaxone day#4  repeat Blood Cx: no growth  ID follow up: will need PICC and home IV Abx x 6weeks    3. DM type II:  c/w ADA  ISS  Increase Lantus to 16u qHS    4. Hypokalemia:   replete K    Disposition: PICC tomorrow
CHIEF COMPLAINT: Patient is a 63y old  Female who presents with a chief complaint of foot ulcer (01 Nov 2023 09:43)    FROM H&P: 63F w/no sig PMH, presents c/o infection of Right first toe w/ malodorous drainage occurring over past several weeks. She's been sent in by her podiatrist for IV abx for osteomyelitis. Pt denies any pain, fever/chills, n/v/d, abd pain, dysuria, cp, sob.  Pt states this started after getting a pedicure. She has not been to any doctor for many years.   -- In ED, given zosyn, vanco, seen by podiatry (note reviewed), gluc 325, Na 129, temp 99, HR 100s, sbp 140s  MRI Rt foot 1.  Extensive osseous destruction of the 1st toe is again seen consistent with osteomyelitis. 2.  Faint marrow edema of the 1st metatarsal head and hallux sesamoids   may be reactive in the absence of confluent T1 marrow replacement, however, early infection cannot be absolutely excluded.  -- LE doppler b/l NEG for dvt    11/1. Denies hx of vascular intervention  Warm to touch extremities. Plan for arterial doppler    11/2. s/p OR w/ amp  11/4. Patient seen and examined this AM. No pain. No SOB. No CP.  OOB.    PAST MEDICAL & SURGICAL HISTORY:  no sig PMH/PSH    FAMILY HISTORY:  denies smoking/etoh/drug use  lives alone, ind ADLs    FAMILY HISTORY:  pt denies     MEDICATIONS  (STANDING):  cefTRIAXone Injectable. 2000 milliGRAM(s) IV Push every 24 hours  dextrose 5%. 1000 milliLiter(s) (50 mL/Hr) IV Continuous <Continuous>  dextrose 5%. 1000 milliLiter(s) (100 mL/Hr) IV Continuous <Continuous>  dextrose 50% Injectable 25 Gram(s) IV Push once  dextrose 50% Injectable 12.5 Gram(s) IV Push once  dextrose 50% Injectable 25 Gram(s) IV Push once  glucagon  Injectable 1 milliGRAM(s) IntraMuscular once  insulin glargine Injectable (LANTUS) 16 Unit(s) SubCutaneous at bedtime  insulin lispro (ADMELOG) corrective regimen sliding scale   SubCutaneous three times a day before meals  insulin lispro (ADMELOG) corrective regimen sliding scale   SubCutaneous at bedtime  sodium chloride 0.9% lock flush 3 milliLiter(s) IV Push every 8 hours    MEDICATIONS  (PRN):  acetaminophen     Tablet .. 650 milliGRAM(s) Oral every 6 hours PRN Temp greater or equal to 38C (100.4F), Mild Pain (1 - 3)  aluminum hydroxide/magnesium hydroxide/simethicone Suspension 30 milliLiter(s) Oral every 4 hours PRN Dyspepsia  dextrose Oral Gel 15 Gram(s) Oral once PRN Blood Glucose LESS THAN 70 milliGRAM(s)/deciliter  melatonin 3 milliGRAM(s) Oral at bedtime PRN Insomnia  ondansetron Injectable 4 milliGRAM(s) IV Push every 8 hours PRN Nausea and/or Vomiting    HOME MEDICATIONS:  Fish Oil 1000 mg oral capsule: 1 cap(s) orally once a day (31 Oct 2023 16:37)  Multiple Vitamins oral tablet: 1 tab(s) orally once a day (31 Oct 2023 16:37)  Oyster Jan 500 mg oral tablet: 1 tab(s) orally once a day (31 Oct 2023 16:37)    PHYSICAL EXAM:  T(C): 36.5 (04 Nov 2023 09:08), Max: 36.5 (04 Nov 2023 09:08)  T(F): 97.7 (04 Nov 2023 09:08), Max: 97.7 (04 Nov 2023 09:08)  HR: 69 (04 Nov 2023 09:08) (69 - 75)  BP: 136/65 (04 Nov 2023 09:08) (128/68 - 136/65)  BP(mean): 84 (03 Nov 2023 16:07) (84 - 84)  RR: 18 (04 Nov 2023 09:08) (18 - 18)  SpO2: 100% (04 Nov 2023 09:08) (95% - 100%)    Parameters below as of 04 Nov 2023 09:08  Patient On (Oxygen Delivery Method): room air    Constitutional: NAD, awake and alert  HEENT: PERR, EOMI, Normal Hearing, MMM  Neck: Soft and supple, No LAD, No JVD  Respiratory: Breath sounds are clear bilaterally, No wheezing, rales or rhonchi  Cardiovascular: S1 and S2, regular rate and rhythm, no Murmurs, gallops or rubs  Gastrointestinal: Bowel Sounds present, soft, nontender, nondistended, no guarding, no rebound  Extremities: No peripheral edema  Vascular: 2+ peripheral pulses  Neurological: A/O x 3, no focal deficits  Musculoskeletal: 5/5 strength b/l upper and lower extremities  Skin: RT foot ulcers/erythema - ACE in place  CVI - R>L    =======================================    LABS: All Labs Reviewed:    ECG:   Ventricular Rate 92 BPM    Atrial Rate 92 BPM    P-R Interval 134 ms    QRS Duration 84 ms    Q-T Interval 340 ms    QTC Calculation(Bazett) 420 ms    P Axis 63 degrees    R Axis -31 degrees    T Axis 54 degrees    Diagnosis Line Normal sinus rhythm  Left axis deviation  Septal infarct , age undetermined  Abnormal ECG  No previous ECGs available  Confirmed by Nilton Pretty MD ([id number]) on 11/1/2023 10:28:25 AM           RADIOLOGY & ADDITIONAL STUDIES:    < from: US Duplex Arterial Lower Ext Compl, Bilateral (11.01.23 @ 19:10) >  Mild calcific plaque without duplex evidence of   hemodynamically significant stenosis or occlusion in the femoropopliteal   circulation of either lower extremity.    Moderate stenoses of the right posterior tibial and bilateral dorsalis   pedis arteries.    < from: MR Foot w/wo IV Cont, Right (10.31.23 @ 17:48) >  1.  Extensive osseous destruction of the 1st toe is again seen consistent   with osteomyelitis.  2.  Faint marrow edema of the 1st metatarsal head and hallux sesamoids   
Date of service: 11-05-23 @ 13:07    Lying in bed in NAD  Weak looking  Has right foot local tenderness and swelling  No fever    ROS: no fever or chills; denies dizziness, no HA, no SOB or cough, no abdominal pain, no diarrhea or constipation; no dysuria    MEDICATIONS  (STANDING):  atorvastatin 10 milliGRAM(s) Oral at bedtime  cefTRIAXone Injectable. 2000 milliGRAM(s) IV Push every 24 hours  dextrose 5%. 1000 milliLiter(s) (50 mL/Hr) IV Continuous <Continuous>  dextrose 5%. 1000 milliLiter(s) (100 mL/Hr) IV Continuous <Continuous>  dextrose 50% Injectable 25 Gram(s) IV Push once  dextrose 50% Injectable 12.5 Gram(s) IV Push once  dextrose 50% Injectable 25 Gram(s) IV Push once  glucagon  Injectable 1 milliGRAM(s) IntraMuscular once  insulin glargine Injectable (LANTUS) 16 Unit(s) SubCutaneous at bedtime  insulin lispro (ADMELOG) corrective regimen sliding scale   SubCutaneous three times a day before meals  insulin lispro (ADMELOG) corrective regimen sliding scale   SubCutaneous at bedtime  lactobacillus acidophilus 1 Tablet(s) Oral three times a day with meals  sodium chloride 0.9% lock flush 3 milliLiter(s) IV Push every 8 hours    Vital Signs Last 24 Hrs  T(C): 36.7 (05 Nov 2023 07:56), Max: 36.7 (05 Nov 2023 07:56)  T(F): 98 (05 Nov 2023 07:56), Max: 98 (05 Nov 2023 07:56)  HR: 61 (05 Nov 2023 07:56) (61 - 79)  BP: 137/62 (05 Nov 2023 07:56) (137/62 - 152/75)  BP(mean): --  RR: 18 (05 Nov 2023 07:56) (18 - 18)  SpO2: 95% (05 Nov 2023 07:56) (95% - 97%)    Parameters below as of 05 Nov 2023 07:56  Patient On (Oxygen Delivery Method): room air     Physical exam:    Constitutional:  No acute distress  HEENT: NC/AT, EOMI, PERRLA, conjunctivae clear; ears and nose atraumatic  Neck: supple; thyroid not palpable  Back: no tenderness  Respiratory: respiratory effort normal; clear to auscultation  Cardiovascular: S1S2 regular, no murmurs  Abdomen: soft, not tender, not distended, positive BS  Genitourinary: no suprapubic tenderness  Lymphatic: no LN palpable  Musculoskeletal: no muscle tenderness, no joint swelling or tenderness  Extremities: 2+ pedal edema  Right great toe ulcer s/p surgery  Right foot, right ankle, shin and calf erythema, edema and tenderness improving  Neurological/ Psychiatric: AxOx3, judgement and insight normal; moving all extremities  Skin: no rashes; no palpable lesions    Labs: reviewed    Vancomycin Level, Trough: 9.7 ug/mL (11-02 @ 21:51  C-Reactive Protein, Serum: 114 mg/L (10-31-23 @ 15:27)    Vancomycin Level, Trough: 11.6 ug/mL (11-01 @ 20:07)                        11.1   12.47 )-----------( 249      ( 01 Nov 2023 08:04 )             32.8     11-01    136  |  100  |  9   ----------------------------<  246<H>  3.4<L>   |  28  |  0.59    Ca    8.5      01 Nov 2023 08:04    TPro  5.9<L>  /  Alb  1.9<L>  /  TBili  0.6  /  DBili  x   /  AST  12<L>  /  ALT  18  /  AlkPhos  96  11-01    Vancomycin Level, Trough: 11.6 ug/mL (11-01 @ 20:07)  C-Reactive Protein, Serum: 114 mg/L (10-31-23 @ 15:27)                        11.1   12.47 )-----------( 249      ( 01 Nov 2023 08:04 )             32.8     11-01    136  |  100  |  9   ----------------------------<  246<H>  3.4<L>   |  28  |  0.59    Ca    8.5      01 Nov 2023 08:04    TPro  5.9<L>  /  Alb  1.9<L>  /  TBili  0.6  /  DBili  x   /  AST  12<L>  /  ALT  18  /  AlkPhos  96  11-01     LIVER FUNCTIONS - ( 01 Nov 2023 08:04 )  Alb: 1.9 g/dL / Pro: 5.9 gm/dL / ALK PHOS: 96 U/L / ALT: 18 U/L / AST: 12 U/L / GGT: x           Culture - Fungal, Tissue (collected 01 Nov 2023 12:19)  Source: .Tissue RIGHT GREAT TOE PROXIMAL PHALANX BONE CX  Preliminary Report (02 Nov 2023 10:17):    Testing in progress    Culture - Acid Fast - Tissue w/Smear (collected 01 Nov 2023 12:19)  Source: .Tissue RIGHT GREAT TOE PROXIMAL PHALANX BONE CX    Culture - Tissue with Gram Stain (collected 01 Nov 2023 12:19)  Source: .Tissue RIGHT GREAT TOE PROXIMAL PHALANX BONE CX  Gram Stain (01 Nov 2023 21:57):    Few polymorphonuclear leukocytes per low power field    Few Gram positive cocci in pairs per oil power field  Preliminary Report (02 Nov 2023 23:43):    Few Streptococcus anginosus    "Susceptibilities not performed"    Culture - Fungal, Other (collected 01 Nov 2023 12:19)  Source: .Other RIGHT GREAT TOE PROXIMAL PHALANX BONE CX  Preliminary Report (02 Nov 2023 10:09):    Testing in progress    Culture - Surgical Swab (collected 01 Nov 2023 12:19)  Source: .Surgical Swab RIGHT GREAT TOE PROXIMAL PHALANX BONE CX  Preliminary Report (02 Nov 2023 23:44):    Moderate Streptococcus anginosus    "Susceptibilities not performed"    Culture - Blood (collected 31 Oct 2023 15:39)  Source: .Blood None  Gram Stain (01 Nov 2023 16:58):    Growth in aerobic bottle: Gram positive cocci in pairs    Growth in anaerobic bottle: Gram positive cocci in pairs  Final Report (02 Nov 2023 10:44):    Growth in aerobic and anaerobic bottles: Streptococcus mitis/oralis group    "Susceptibilities not performed"    Direct identification is available within approximately 3-5    hours either by Blood Panel Multiplexed PCR or Direct    MALDI-TOF. Details: [email or web address]  Organism: Blood Culture PCR (02 Nov 2023 10:44)  Organism: Blood Culture PCR (02 Nov 2023 10:44)      Method Type: PCR      -  Streptococcus sp. (Not Grp A, B or S pneumoniae): Detec    Culture - Blood (collected 31 Oct 2023 15:27)  Source: .Blood None  Gram Stain (01 Nov 2023 15:06):    Growth in aerobic bottle: Gram positive cocci in pairs    Growth in anaerobic bottle: Gram positive cocci in pairs  Final Report (03 Nov 2023 07:30):    Growth in aerobic and anaerobic bottles: Streptococcus mitis/oralis group  Organism: Streptococcus mitis/oralis group (03 Nov 2023 07:30)  Organism: Streptococcus mitis/oralis group (03 Nov 2023 07:30)      Method Type: MARIBEL      -  Ceftriaxone: S <=0.25      -  Penicillin: S 0.06      -  Vancomycin: S 1    Culture - Other (collected 31 Oct 2023 14:20)  Source: .Other wound on right big toe  Final Report (02 Nov 2023 18:06):    Numerous Streptococcus anginosus "Susceptibilities not performed"    Normal skin jones isolated    Culture - Blood (collected 02 Nov 2023 08:47)  Source: .Blood None  Preliminary Report (03 Nov 2023 13:01):    No growth at 24 hours    Culture - Blood (collected 02 Nov 2023 08:46)  Source: .Blood None  Preliminary Report (03 Nov 2023 13:01):    No growth at 24 hours    Radiology: all available radiological tests reviewed    < from: MR Foot w/wo IV Cont, Right (10.31.23 @ 17:48) >  1.  Extensive osseous destruction of the 1st toe is again seen consistent   with osteomyelitis.  2.  Faint marrow edema of the 1st metatarsal head and hallux sesamoids   may be reactive in the absence of confluent T1 marrow replacement,   however, early infection cannot be absolutely excluded.    < end of copied text >      Advanced directives addressed: full resuscitation
PODIATRIC SX: ED H&PEPmeredith is a 63y old  Female who presents with a chief complaint of malodorous draining RIGHT Great Toe seen in office today. No fever or chills. No rest pain. Xrays taken of the right foot revealed bone destruction of the Right Great Toe Proximal & Distal phalanges. No Gas. She has not seen a medical doctor x years. No pulses noted to right foot. C&S was taken in office of plantar sinus tract + probe to bone.No RX's NKDA     HPI:See above      Allergies    No Known Allergies    Intolerances        MEDICATIONS  (STANDING):  piperacillin/tazobactam IVPB. 3.375 Gram(s) IV Intermittent once  piperacillin/tazobactam IVPB.. 3.375 Gram(s) IV Intermittent once  vancomycin  IVPB 1250 milliGRAM(s) IV Intermittent once    MEDICATIONS  (PRN):        RADIOLOGY Xray taken of L foot SHOULD BE RIGHT FOOT.        HPI:      Allergies    No Known Allergies    Intolerances        MEDICATIONS  (STANDING):  piperacillin/tazobactam IVPB. 3.375 Gram(s) IV Intermittent once  piperacillin/tazobactam IVPB.. 3.375 Gram(s) IV Intermittent once  vancomycin  IVPB 1250 milliGRAM(s) IV Intermittent once    MEDICATIONS  (PRN):      PHYSICAL EXAM: Antalgic gait Nonpalkpable DP/PT pulses R foot XS edema & erythema Cap fill delayed, bilat temp gradient reversed R>>L multiple VV no Mikala's Muted DTRs/STRs Vibratory sensation muted Below the knees to the Great Toe Joint. Right Hallux with effused 1st MTP, draining ulcer over plantar medial IPJ + probe to bone cellulitis to 1st MTP malodor & serous drainage. Xrays taken in office today reveal alix bone destruction to the proximal & distal phalanges of the right Great Toe. No Gas.      Constitutional:SEE HPI    Eyes:Clear moist    ENMT:No tinnitus    Neck:No DJV    Breasts:Defer    Back:No LBP    Respiratory:No cough    Cardiovascular:No SOB    Gastrointestinal:No nausea    Genitourinary:No frequency    Rectal:defer    Extremities:weakness no rest pain/claudication    Vascular:PVD No pulses R foot    Neurological:Muted sensorium    Skin:Foot Ulcer R Hallux    Lymph Nodes:No    Musculoskeletal:Weakness    Psychiatric:?        RADIOLOGY X-ray taken in office today DP/Lateral Right foot NWB reveasled alix bone destruction of the proximal & distal phalanges to the right Great Toe. No Gas.                                
Patient is a 63y old  Female who presents with a chief complaint of foot ulcer (31 Oct 2023 19:32)      HPI:  HPI:  63F w/no sig PMH, presents c/o infection of Right first toe w/ malodorous drainage occurring over past several weeks. She's been sent in by her podiatrist for IV abx for osteomyelitis. Pt denies any pain, fever/chills, n/v/d, abd pain, dysuria, cp, sob.  Pt states this started after getting a pedicure. She has not been to any doctor for many years.     In ED, given zosyn, vanco, seen by podiatry (note reviewed), gluc 325, Na 129, temp 99, HR 100s, sbp 140s  MRI Rt foot 1.  Extensive osseous destruction of the 1st toe is again seen consistent with osteomyelitis. 2.  Faint marrow edema of the 1st metatarsal head and hallux sesamoids   may be reactive in the absence of confluent T1 marrow replacement, however, early infection cannot be absolutely excluded.    LE doppler b/l NEG for dvt    PAST MEDICAL & SURGICAL HISTORY:  no sig PMH/PSH    Review of Systems:   CONSTITUTIONAL: No fever.  EYES: No eye pain or discharge.  ENMT:  No sinus or throat pain  NECK: No pain or stiffness  RESPIRATORY: No cough, wheezing, chills or hemoptysis; No shortness of breath  CARDIOVASCULAR: No chest pain, palpitations, dizziness, or leg swelling  GASTROINTESTINAL: No abdominal or epigastric pain. No nausea, vomiting, or hematemesis; No diarrhea or constipation. No melena or hematochezia.  GENITOURINARY: No dysuria or incontinence  NEUROLOGICAL: No headaches, memory loss, loss of strength, numbness, or tremors  SKIN: No rashes.  MUSCULOSKELETAL: No joint pain or swelling; No muscle, back, or extremity pain ++ see hpi   PSYCHIATRIC: No depression, anxiety, mood swings, or difficulty sleeping    Social History:   denies smoking/etoh/drug use  lives alone, ind ADLs    FAMILY HISTORY:  pt denies       MEDICATIONS  (STANDING):  dextrose 5%. 1000 milliLiter(s) (100 mL/Hr) IV Continuous <Continuous>  dextrose 5%. 1000 milliLiter(s) (50 mL/Hr) IV Continuous <Continuous>  dextrose 50% Injectable 12.5 Gram(s) IV Push once  dextrose 50% Injectable 25 Gram(s) IV Push once  dextrose 50% Injectable 25 Gram(s) IV Push once  glucagon  Injectable 1 milliGRAM(s) IntraMuscular once  insulin glargine Injectable (LANTUS) 8 Unit(s) SubCutaneous at bedtime  insulin lispro (ADMELOG) corrective regimen sliding scale   SubCutaneous at bedtime  insulin lispro (ADMELOG) corrective regimen sliding scale   SubCutaneous three times a day before meals  piperacillin/tazobactam IVPB.. 3.375 Gram(s) IV Intermittent once    MEDICATIONS  (PRN):  acetaminophen     Tablet .. 650 milliGRAM(s) Oral every 6 hours PRN Temp greater or equal to 38C (100.4F), Mild Pain (1 - 3)  aluminum hydroxide/magnesium hydroxide/simethicone Suspension 30 milliLiter(s) Oral every 4 hours PRN Dyspepsia  dextrose Oral Gel 15 Gram(s) Oral once PRN Blood Glucose LESS THAN 70 milliGRAM(s)/deciliter  melatonin 3 milliGRAM(s) Oral at bedtime PRN Insomnia  ondansetron Injectable 4 milliGRAM(s) IV Push every 8 hours PRN Nausea and/or Vomiting      PHYSICAL EXAM:  Vital Signs Last 24 Hrs  T(C): 37.4 (31 Oct 2023 18:12), Max: 37.4 (31 Oct 2023 18:12)  T(F): 99.3 (31 Oct 2023 18:12), Max: 99.3 (31 Oct 2023 18:12)  HR: 94 (31 Oct 2023 18:12) (94 - 104)  BP: 144/69 (31 Oct 2023 18:12) (140/72 - 144/69)  BP(mean): 88 (31 Oct 2023 13:49) (88 - 88)  RR: 18 (31 Oct 2023 18:12) (18 - 18)  SpO2: 96% (31 Oct 2023 18:12) (96% - 98%)    Parameters below as of 31 Oct 2023 18:12  Patient On (Oxygen Delivery Method): room air  GENERAL: NAD,   HEAD:  Atraumatic, Normocephalic  EYES: EOMI, PERRLA, conjunctiva and sclera clear  ENT: normal hearing, no nasal discharge, throat clear, dentition normal  NECK: Supple, No JVD, no LAD, no thyromegaly   CHEST/LUNG: Clear to auscultation bilaterally; No wheeze, respirations unlabored  HEART: Regular rate and rhythm; No murmurs, rubs, or gallops  ABDOMEN: Soft, Nontender, Nondistended; Bowel sounds present, no HSM  EXTREMITIES:  dec Peripheral Pulses of LE, No clubbing, cyanosis, +mild  edema RLE, RT first toe w/ swelling/erythema/draining ulcer  PSYCH: AAOx3, normal behavior  NEUROLOGY: non-focal, sensory and cn 2-12 intact, speech/language intact  SKIN: No visible rashes or lesions    LABS:                        12.4   16.24 )-----------( 313      ( 31 Oct 2023 15:27 )             37.2     10-31    129<L>  |  95<L>  |  12  ----------------------------<  325<H>  4.0   |  25  |  0.87    Ca    8.9      31 Oct 2023 15:27    TPro  7.4  /  Alb  2.4<L>  /  TBili  0.8  /  DBili  x   /  AST  12<L>  /  ALT  23  /  AlkPhos  111  10-31    PT/INR - ( 31 Oct 2023 15:27 )   PT: 13.7 sec;   INR: 1.22 ratio         PTT - ( 31 Oct 2023 15:27 )  PTT:31.7 sec      Urinalysis Basic - ( 31 Oct 2023 15:27 )    Color: x / Appearance: x / SG: x / pH: x  Gluc: 325 mg/dL / Ketone: x  / Bili: x / Urobili: x   Blood: x / Protein: x / Nitrite: x   Leuk Esterase: x / RBC: x / WBC x   Sq Epi: x / Non Sq Epi: x / Bacteria: x        RADIOLOGY & ADDITIONAL TESTS:    Imaging Personally Reviewed:  MRI Rt foot 1.  Extensive osseous destruction of the 1st toe is again seen consistent with osteomyelitis. 2.  Faint marrow edema of the 1st metatarsal head and hallux sesamoids   may be reactive in the absence of confluent T1 marrow replacement, however, early infection cannot be absolutely excluded.    LE doppler b/l NEG for dvt    EKG Personally Reviewed:    Consultant(s) Notes Reviewed:      Care Discussed with Consultants/Other Providers:   (31 Oct 2023 19:32)      Allergies    No Known Allergies    Intolerances        MEDICATIONS  (STANDING):  dextrose 5%. 1000 milliLiter(s) (50 mL/Hr) IV Continuous <Continuous>  dextrose 5%. 1000 milliLiter(s) (100 mL/Hr) IV Continuous <Continuous>  dextrose 50% Injectable 12.5 Gram(s) IV Push once  dextrose 50% Injectable 25 Gram(s) IV Push once  dextrose 50% Injectable 25 Gram(s) IV Push once  glucagon  Injectable 1 milliGRAM(s) IntraMuscular once  insulin glargine Injectable (LANTUS) 8 Unit(s) SubCutaneous at bedtime  insulin lispro (ADMELOG) corrective regimen sliding scale   SubCutaneous at bedtime  insulin lispro (ADMELOG) corrective regimen sliding scale   SubCutaneous three times a day before meals  piperacillin/tazobactam IVPB.. 3.375 Gram(s) IV Intermittent every 8 hours  sodium chloride 0.9%. 1000 milliLiter(s) (125 mL/Hr) IV Continuous <Continuous>  vancomycin  IVPB 1500 milliGRAM(s) IV Intermittent every 12 hours    MEDICATIONS  (PRN):  acetaminophen     Tablet .. 650 milliGRAM(s) Oral every 6 hours PRN Temp greater or equal to 38C (100.4F), Mild Pain (1 - 3)  aluminum hydroxide/magnesium hydroxide/simethicone Suspension 30 milliLiter(s) Oral every 4 hours PRN Dyspepsia  dextrose Oral Gel 15 Gram(s) Oral once PRN Blood Glucose LESS THAN 70 milliGRAM(s)/deciliter  melatonin 3 milliGRAM(s) Oral at bedtime PRN Insomnia  ondansetron Injectable 4 milliGRAM(s) IV Push every 8 hours PRN Nausea and/or Vomiting        RADIOLOGY    CBC Full  -  ( 31 Oct 2023 15:27 )  WBC Count : 16.24 K/uL  RBC Count : 4.26 M/uL  Hemoglobin : 12.4 g/dL  Hematocrit : 37.2 %  Platelet Count - Automated : 313 K/uL  Mean Cell Volume : 87.3 fl  Mean Cell Hemoglobin : 29.1 pg  Mean Cell Hemoglobin Concentration : 33.3 gm/dL  Auto Neutrophil # : 14.09 K/uL  Auto Lymphocyte # : 0.95 K/uL  Auto Monocyte # : 0.99 K/uL  Auto Eosinophil # : 0.01 K/uL  Auto Basophil # : 0.08 K/uL  Auto Neutrophil % : 86.8 %  Auto Lymphocyte % : 5.8 %  Auto Monocyte % : 6.1 %  Auto Eosinophil % : 0.1 %  Auto Basophil % : 0.5 %      10-31    129<L>  |  95<L>  |  12  ----------------------------<  325<H>  4.0   |  25  |  0.87    Ca    8.9      31 Oct 2023 15:27    TPro  7.4  /  Alb  2.4<L>  /  TBili  0.8  /  DBili  x   /  AST  12<L>  /  ALT  23  /  AlkPhos  111  10-31      Sedimentation Rate, Erythrocyte: 95 mm/hr (10-31 @ 15:27)    < from: Xray Foot AP + Lateral + Oblique, Bilat (10.31.23 @ 14:53) >  ACC: [id number] EXAM:  XR CHEST PA LAT 2V   ORDERED BY: VIRGILIO JASON     ACC: [id number] EXAM:  XR FOOT COMP MIN 3 VIEWS BI   ORDERED BY: VIRGILIO JASON     PROCEDURE DATE:  10/31/2023          INTERPRETATION:  XR FOOT COMPLETE 3 VIEWS BILATERAL, XR CHEST PA AND   LATERAL    Clinical History: Redness and swelling of the first toe. Admission    AP, lateral and oblique view of the left foot      FINDINGS:    There is no fracture, dislocation or joint effusion.  Mild thickening shaft of the fifth metatarsal may be related to remote   trauma.  Moderate to severe hallux valgus. Lucency first metatarsal head with   adjacent soft tissue swelling. Focal soft tissue ulcer.  Moderate plantar spur.  Small spur at the peroneus brevis insertion base of fifth metatarsal.  No radiopaque foreign body.  Atherosclerotic changes    PA and lateral chest    COMPARISON: None    FINDINGS:  Heart/Vascular: The heart size, mediastinum, hilum and aorta are within   normal limits for projection.  Pulmonary: Midline trachea. There is no focal infiltrate, congestion or   effusion.  Bones: There is no fracture.  Lines and catheter: None    Impression:    No acute pulmonary disease.    Moderate to severe hallux valgus.  Lucency first metatarsal head with adjacent soft tissue swelling. Focal   soft tissue ulcer. Findings could represent osteomyelitis.  Additional findings as above.    Recommend correlation clinically and consider MRI.    --- End of Report ---            YUMIKO SIMON DO; Attending Radiologist  This document has been electronically signed. Oct 31 2023  3:09PM    < end of copied text >  < from: MR Foot w/wo IV Cont, Right (10.31.23 @ 17:48) >  ACC: [id number] EXAM:  MR FOOT WAW IC RT   ORDERED BY: KATY DAILEY     PROCEDURE DATE:  10/31/2023          INTERPRETATION:  MR FOOT WITHOUT AND WITH IV CONTRAST RIGHT    HISTORY: Foot swelling, diabetic, osteomyelitis suspected. Malodorous   drainage and ulcer of the right hallux. Wound probes to bone.    TECHNIQUE:  Multiplanar MRI of the right forefoot was performed without   and with 8.0 cc administered Gadavist intravenous contrast.    COMPARISON:  Bilateral foot x-rays dated 10/31/2023.    FINDINGS:    OSSEOUS STRUCTURES    Osteomyelitis: Soft tissue wound is present along the plantar margin of   the 1st interphalangeal joint with extensive osseous destruction and   osteomyelitis of the 1st proximal and distal phalanges. Gas is present   within the soft tissue wound and tract extending into the interphalangeal   joint and fragmented proximal phalanx.    Marrow:  There is faint marrow edema within the 1st metatarsal head and   hallux sesamoids without confluent T1 marrow replacement that may be   reactive or reflect additional infection. Marrow edema involves the 1st   metatarsal neck, 2nd through 4th metatarsal proximal diaphyses, and   midfoot that is presumably reactive or stress related. There is   associated postcontrast enhancement.    INTERPHALANGEAL JOINTS    Articular Surfaces:  Otherwise preserved.    Effusions/Synovitis:  There is mild synovitis of the 1st   interphalangeal joint with soft tissue wound extending into the joint.    1ST METATARSOPHALANGEAL JOINT    Articular Surfaces:  Tiny marginal osteophytes are present. Hallux   valgus deformity appears to be present.    Effusions/Synovitis:  There is mild synovitis.    Sesamoids:  Hallux sesamoids are located with tiny osteophytes and   faint marrow edema.    LESSER METATARSOPHALANGEAL JOINTS    Articular Surfaces:  Preserved.    Effusions/Synovitis:  There is slight synovial enhancement of the 2nd   through 5th metatarsophalangeal joints.    TARSOMETATARSAL JOINTS    Articular Surfaces:  There are tiny scattered osteophytes. Tiny   subchondral cyst is noted within the 3rd cuneiform.    Effusions/Synovitis:  None.    INTERTARSAL JOINTS    Articular Surfaces:  There are tiny scattered osteophytes. Small   subchondral cyst is noted within the navicular at the 1st   naviculocuneiform joint.    Effusions/Synovitis:  None.    INTERMETATARSAL SPACES    Neuromas:  None.    Bursa:  None.    LISFRANC LIGAMENT  Intact.    TENDONS    Flexor Tendons:  The flexor hallucis longus tendon is suspected to be   ruptured at the level of the soft tissue wound.    Extensor Tendons:  Intact.    DISTAL PLANTAR FASCIA  Intact.    SOFT TISSUES    Musculature:  Moderate generalized muscle atrophy is present with   increased T2 signal likely reflecting diabetic neuropathy.    Subcutaneous Tissues:  Moderate generalized subcutaneous edema is   present. No discrete abscess or additional gas is seen. Plantar soft   tissue wound along the 1st interphalangeal joint contains gas within the   tract.    IMPRESSION:  1.  Extensive osseous destruction of the 1st toe is again seen consistent   with osteomyelitis.  2.  Faint marrow edema of the 1st metatarsal head and hallux sesamoids   may be reactive in the absence of confluent T1 marrow replacement,   however, early infection cannot be absolutely excluded.    --- End of Report ---            THIAGO SHAY MD; Attending Radiologist  This document has been electronically signed. Oct 31 2023  6:08PM    < end of copied text >  < from: MR Foot w/wo IV Cont, Right (10.31.23 @ 17:48) >  ACC: [id number] EXAM:  MR FOOT WAW IC RT   ORDERED BY: KATY DAILEY     PROCEDURE DATE:  10/31/2023          INTERPRETATION:  MR FOOT WITHOUT AND WITH IV CONTRAST RIGHT    HISTORY: Foot swelling, diabetic, osteomyelitis suspected. Malodorous   drainage and ulcer of the right hallux. Wound probes to bone.    TECHNIQUE:  Multiplanar MRI of the right forefoot was performed without   and with 8.0 cc administered Gadavist intravenous contrast.    COMPARISON:  Bilateral foot x-rays dated 10/31/2023.    FINDINGS:    OSSEOUS STRUCTURES    Osteomyelitis: Soft tissue wound is present along the plantar margin of   the 1st interphalangeal joint with extensive osseous destruction and   osteomyelitis of the 1st proximal and distal phalanges. Gas is present   within the soft tissue wound and tract extending into the interphalangeal   joint and fragmented proximal phalanx.    Marrow:  There is faint marrow edema within the 1st metatarsal head and   hallux sesamoids without confluent T1 marrow replacement that may be   reactive or reflect additional infection. Marrow edema involves the 1st   metatarsal neck, 2nd through 4th metatarsal proximal diaphyses, and   midfoot that is presumably reactive or stress related. There is   associated postcontrast enhancement.    INTERPHALANGEAL JOINTS    Articular Surfaces:  Otherwise preserved.    Effusions/Synovitis:  There is mild synovitis of the 1st   interphalangeal joint with soft tissue wound extending into the joint.    1ST METATARSOPHALANGEAL JOINT    Articular Surfaces:  Tiny marginal osteophytes are present. Hallux   valgus deformity appears to be present.    Effusions/Synovitis:  There is mild synovitis.    Sesamoids:  Hallux sesamoids are located with tiny osteophytes and   faint marrow edema.    LESSER METATARSOPHALANGEAL JOINTS    Articular Surfaces:  Preserved.    Effusions/Synovitis:  There is slight synovial enhancement of the 2nd   through 5th metatarsophalangeal joints.    TARSOMETATARSAL JOINTS    Articular Surfaces:  There are tiny scattered osteophytes. Tiny   subchondral cyst is noted within the 3rd cuneiform.    Effusions/Synovitis:  None.    INTERTARSAL JOINTS    Articular Surfaces:  There are tiny scattered osteophytes. Small   subchondral cyst is noted within the navicular at the 1st   naviculocuneiform joint.    Effusions/Synovitis:  None.    INTERMETATARSAL SPACES    Neuromas:  None.    Bursa:  None.    LISFRANC LIGAMENT  Intact.    TENDONS    Flexor Tendons:  The flexor hallucis longus tendon is suspected to be   ruptured at the level of the soft tissue wound.    Extensor Tendons:  Intact.    DISTAL PLANTAR FASCIA  Intact.    SOFT TISSUES    Musculature:  Moderate generalized muscle atrophy is present with   increased T2 signal likely reflecting diabetic neuropathy.    Subcutaneous Tissues:  Moderate generalized subcutaneous edema is   present. No discrete abscess or additional gas is seen. Plantar soft   tissue wound along the 1st interphalangeal joint contains gas within the   tract.    IMPRESSION:  1.  Extensive osseous destruction of the 1st toe is again seen consistent   with osteomyelitis.  2.  Faint marrow edema of the 1st metatarsal head and hallux sesamoids   may be reactive in the absence of confluent T1 marrow replacement,   however, early infection cannot be absolutely excluded.    --- End of Report ---            THIAGO SHAY MD; Attending Radiologist  This document has been electronically signed. Oct 31 2023  6:08PM    < end of copied text >    
HPI:  63 F w/no sig PMH, presents c/o infection of Right first toe w/ malodorous drainage occurring over past several weeks. She's been sent in by her podiatrist for IV abx for osteomyelitis. Pt denies any pain, fever/chills, n/v/d, abd pain, dysuria, cp, sob.  Pt states this started after getting a pedicure. She has not been to any doctor for many years.     In ED, given zosyn, vanco, seen by podiatry (note reviewed), gluc 325, Na 129, temp 99, HR 100s, sbp 140s  MRI Rt foot 1.  Extensive osseous destruction of the 1st toe is again seen consistent with osteomyelitis. 2.  Faint marrow edema of the 1st metatarsal head and hallux sesamoids   may be reactive in the absence of confluent T1 marrow replacement, however, early infection cannot be absolutely excluded.    LE doppler b/l NEG for dvt    11.01:  no cp, no sob  no exertional angina, no dyspnea            REVIEW OF SYSTEMS:    CONSTITUTIONAL: No weakness, No fevers or chills  ENT: No ear ache, No sorethroat  NECK: No pain, No stiffness  RESPIRATORY: No cough, No wheezing, No hemoptysis; No dyspnea  CARDIOVASCULAR: No chest pain, No palpitations  GASTROINTESTINAL: No abd pain, No nausea, No vomiting, No hematemesis, No diarrhea or constipation. No melena, No hematochezia.  GENITOURINARY: No dysuria, No  hematuria  NEUROLOGICAL: No diplopia, No paresthesia, No motor dysfunction  MUSCULOSKELETAL: No arthralgia, No myalgia  SKIN: No rashes, or lesions   PSYCH: no anxiety, no suicidal ideation    All other review of systems is negative unless indicated above    Vital Signs Last 24 Hrs  T(C): 36.9 (01 Nov 2023 07:40), Max: 37.7 (31 Oct 2023 21:24)  T(F): 98.5 (01 Nov 2023 07:40), Max: 99.8 (31 Oct 2023 21:24)  HR: 86 (01 Nov 2023 07:40) (86 - 104)  BP: 122/57 (01 Nov 2023 07:40) (118/75 - 144/69)  BP(mean): 78 (31 Oct 2023 21:24) (78 - 88)  RR: 18 (01 Nov 2023 07:40) (18 - 18)  SpO2: 92% (01 Nov 2023 07:40) (92% - 98%)    Parameters below as of 01 Nov 2023 07:40  Patient On (Oxygen Delivery Method): room air        PHYSICAL EXAM:    GENERAL: NAD  HEENT:  NC/AT, EOMI, PERRLA, No scleral icterus, Moist mucous membranes  NECK: Supple, No JVD  CNS:  Alert & Oriented X3, Motor Strength 5/5 B/L upper and lower extremities; DTRs 2+ intact   LUNG: Normal Breath sounds, Clear to auscultation bilaterally, No rales, No rhonchi, No wheezing  HEART: RRR; No murmurs, No rubs  ABDOMEN: +BS, ST/ND/NT  GENITOURINARY: Voiding, Bladder not distended  EXTREMITIES:  2+ Peripheral Pulses, No clubbing, No cyanosis, No tibial edema  MUSCULOSKELTAL: Joints normal ROM, No TTP, No effusion  VAGINAL: deferred  SKIN: no rashes  RECTAL: deferred, not indicated  BREAST: deferred                          11.1   12.47 )-----------( 249      ( 01 Nov 2023 08:04 )             32.8     11-01    136  |  100  |  9   ----------------------------<  246<H>  3.4<L>   |  28  |  0.59    Ca    8.5      01 Nov 2023 08:04    TPro  5.9<L>  /  Alb  1.9<L>  /  TBili  0.6  /  DBili  x   /  AST  12<L>  /  ALT  18  /  AlkPhos  96  11-01    Vancomycin levels:   Cultures:     MEDICATIONS  (STANDING):  cefTRIAXone Injectable. 2000 milliGRAM(s) IV Push every 24 hours  glucagon  Injectable 1 milliGRAM(s) IntraMuscular once  insulin glargine Injectable (LANTUS) 8 Unit(s) SubCutaneous at bedtime  insulin lispro (ADMELOG) corrective regimen sliding scale   SubCutaneous three times a day before meals  insulin lispro (ADMELOG) corrective regimen sliding scale   SubCutaneous at bedtime  sodium chloride 0.9% lock flush 3 milliLiter(s) IV Push every 8 hours  sodium chloride 0.9%. 1000 milliLiter(s) (125 mL/Hr) IV Continuous <Continuous>  vancomycin  IVPB 1000 milliGRAM(s) IV Intermittent every 12 hours    MEDICATIONS  (PRN):  acetaminophen     Tablet .. 650 milliGRAM(s) Oral every 6 hours PRN Temp greater or equal to 38C (100.4F), Mild Pain (1 - 3)  aluminum hydroxide/magnesium hydroxide/simethicone Suspension 30 milliLiter(s) Oral every 4 hours PRN Dyspepsia  dextrose Oral Gel 15 Gram(s) Oral once PRN Blood Glucose LESS THAN 70 milliGRAM(s)/deciliter  melatonin 3 milliGRAM(s) Oral at bedtime PRN Insomnia  ondansetron Injectable 4 milliGRAM(s) IV Push every 8 hours PRN Nausea and/or Vomiting      all labs reviewed  all imaging reviewed    a/p:    1. Right foot OM involving 1st MT and 1st Toe:  2. Sepsis due to GPC bacteremia    will need source control: for OR/Surgical exploration     TTE: normal EF, mild-mod TR    Patient is medically stable for the podiatry surgical exploration    3. DM type II:  c/w ADA  ISS    4. Hypokalemia:   replete K
63 F w/no sig PMH, presents c/o infection of Right first toe w/ malodorous drainage occurring over past several weeks. She's been sent in by her podiatrist for IV abx for osteomyelitis. Pt denies any pain, fever/chills, n/v/d, abd pain, dysuria, cp, sob.  Pt states this started after getting a pedicure. She has not been to any doctor for many years.     In ED, given zosyn, vanco, seen by podiatry (note reviewed), gluc 325, Na 129, temp 99, HR 100s, sbp 140s  MRI Rt foot 1.  Extensive osseous destruction of the 1st toe is again seen consistent with osteomyelitis. 2.  Faint marrow edema of the 1st metatarsal head and hallux sesamoids   may be reactive in the absence of confluent T1 marrow replacement, however, early infection cannot be absolutely excluded.    LE doppler b/l NEG for dvt    11.01:  no cp, no sob  no exertional angina, no dyspnea  11.02: no distress, s/p Rt foot excisional debridement           REVIEW OF SYSTEMS:    CONSTITUTIONAL: No weakness, No fevers or chills  ENT: No ear ache, No sorethroat  NECK: No pain, No stiffness  RESPIRATORY: No cough, No wheezing, No hemoptysis; No dyspnea  CARDIOVASCULAR: No chest pain, No palpitations  GASTROINTESTINAL: No abd pain, No nausea, No vomiting, No hematemesis, No diarrhea or constipation. No melena, No hematochezia.  GENITOURINARY: No dysuria, No  hematuria  NEUROLOGICAL: No diplopia, No paresthesia, No motor dysfunction  MUSCULOSKELETAL: No arthralgia, No myalgia  SKIN: No rashes, or lesions   PSYCH: no anxiety, no suicidal ideation    All other review of systems is negative unless indicated above    Vital Signs Last 24 Hrs  T(C): 37.2 (02 Nov 2023 08:20), Max: 37.7 (01 Nov 2023 23:22)  T(F): 99 (02 Nov 2023 08:20), Max: 99.8 (01 Nov 2023 23:22)  HR: 88 (02 Nov 2023 08:20) (72 - 93)  BP: 127/53 (02 Nov 2023 08:20) (103/48 - 152/69)  RR: 20 (02 Nov 2023 08:20) (18 - 20)  SpO2: 95% (02 Nov 2023 08:20) (94% - 100%)    Parameters below as of 02 Nov 2023 08:20  Patient On (Oxygen Delivery Method): room air        PHYSICAL EXAM:    GENERAL: NAD  HEENT:  NC/AT, EOMI, PERRLA, No scleral icterus, Moist mucous membranes  NECK: Supple, No JVD  CNS:  Alert & Oriented X3, Motor Strength 5/5 B/L upper and lower extremities; DTRs 2+ intact   LUNG: Normal Breath sounds, Clear to auscultation bilaterally, No rales, No rhonchi, No wheezing  HEART: RRR; No murmurs, No rubs  ABDOMEN: +BS, ST/ND/NT  GENITOURINARY: Voiding, Bladder not distended  EXTREMITIES: Rt foot bulky surgical dressing present   MUSCULOSKELTAL: Joints normal ROM, No TTP, No effusion  VAGINAL: deferred  SKIN: no rashes  RECTAL: deferred, not indicated  BREAST: deferred                          11.1   12.47 )-----------( 249      ( 01 Nov 2023 08:04 )             32.8     11-01    136  |  100  |  9   ----------------------------<  246<H>  3.4<L>   |  28  |  0.59    Ca    8.5      01 Nov 2023 08:04    TPro  5.9<L>  /  Alb  1.9<L>  /  TBili  0.6  /  DBili  x   /  AST  12<L>  /  ALT  18  /  AlkPhos  96  11-01    Vancomycin levels:   Cultures:     MEDICATIONS  (STANDING):  cefTRIAXone Injectable. 2000 milliGRAM(s) IV Push every 24 hours  glucagon  Injectable 1 milliGRAM(s) IntraMuscular once  insulin glargine Injectable (LANTUS) 8 Unit(s) SubCutaneous at bedtime  insulin lispro (ADMELOG) corrective regimen sliding scale   SubCutaneous three times a day before meals  insulin lispro (ADMELOG) corrective regimen sliding scale   SubCutaneous at bedtime  sodium chloride 0.9% lock flush 3 milliLiter(s) IV Push every 8 hours  sodium chloride 0.9%. 1000 milliLiter(s) (125 mL/Hr) IV Continuous <Continuous>  vancomycin  IVPB 1000 milliGRAM(s) IV Intermittent every 12 hours    MEDICATIONS  (PRN):  acetaminophen     Tablet .. 650 milliGRAM(s) Oral every 6 hours PRN Temp greater or equal to 38C (100.4F), Mild Pain (1 - 3)  aluminum hydroxide/magnesium hydroxide/simethicone Suspension 30 milliLiter(s) Oral every 4 hours PRN Dyspepsia  dextrose Oral Gel 15 Gram(s) Oral once PRN Blood Glucose LESS THAN 70 milliGRAM(s)/deciliter  melatonin 3 milliGRAM(s) Oral at bedtime PRN Insomnia  ondansetron Injectable 4 milliGRAM(s) IV Push every 8 hours PRN Nausea and/or Vomiting      all labs reviewed  all imaging reviewed    a/p:    1. Right foot OM involving 1st MT and 1st Toe:  2. Sepsis due to Streptococcus  bacteremia    -s/p excisional debridement by Podiatry  local wound care per surgery    TTE: normal EF, mild-mod TR  no evidence of vegetations     c/w Ceftriaxone/vancomycin day#2  f/u wound cultures  ID follow up    3. DM type II:  c/w ADA  ISS    4. Hypokalemia:   replete K
Date of service: 11-04-23 @ 12:29    Lying in bed in NAD  Has right foot local tenderness  Right shin feels warm  No fever    ROS: no fever or chills; denies dizziness, no HA, no SOB or cough, no abdominal pain, no diarrhea or constipation; no dysuria    MEDICATIONS  (STANDING):  atorvastatin 10 milliGRAM(s) Oral at bedtime  cefTRIAXone Injectable. 2000 milliGRAM(s) IV Push every 24 hours  dextrose 5%. 1000 milliLiter(s) (50 mL/Hr) IV Continuous <Continuous>  dextrose 5%. 1000 milliLiter(s) (100 mL/Hr) IV Continuous <Continuous>  dextrose 50% Injectable 25 Gram(s) IV Push once  dextrose 50% Injectable 12.5 Gram(s) IV Push once  dextrose 50% Injectable 25 Gram(s) IV Push once  glucagon  Injectable 1 milliGRAM(s) IntraMuscular once  insulin glargine Injectable (LANTUS) 16 Unit(s) SubCutaneous at bedtime  insulin lispro (ADMELOG) corrective regimen sliding scale   SubCutaneous three times a day before meals  insulin lispro (ADMELOG) corrective regimen sliding scale   SubCutaneous at bedtime  lactobacillus acidophilus 1 Tablet(s) Oral three times a day with meals  sodium chloride 0.9% lock flush 3 milliLiter(s) IV Push every 8 hours    Vital Signs Last 24 Hrs  T(C): 36.5 (04 Nov 2023 09:08), Max: 36.5 (04 Nov 2023 09:08)  T(F): 97.7 (04 Nov 2023 09:08), Max: 97.7 (04 Nov 2023 09:08)  HR: 69 (04 Nov 2023 09:08) (69 - 75)  BP: 136/65 (04 Nov 2023 09:08) (128/68 - 136/65)  BP(mean): 84 (03 Nov 2023 16:07) (84 - 84)  RR: 18 (04 Nov 2023 09:08) (18 - 18)  SpO2: 100% (04 Nov 2023 09:08) (95% - 100%)    Parameters below as of 04 Nov 2023 09:08  Patient On (Oxygen Delivery Method): room air     Physical exam:    Constitutional:  No acute distress  HEENT: NC/AT, EOMI, PERRLA, conjunctivae clear; ears and nose atraumatic  Neck: supple; thyroid not palpable  Back: no tenderness  Respiratory: respiratory effort normal; clear to auscultation  Cardiovascular: S1S2 regular, no murmurs  Abdomen: soft, not tender, not distended, positive BS  Genitourinary: no suprapubic tenderness  Lymphatic: no LN palpable  Musculoskeletal: no muscle tenderness, no joint swelling or tenderness  Extremities: 2+ pedal edema  Right great toe ulcer s/p surgery  Right foot, right ankle, shin and calf erythema, edema and tenderness improving  Neurological/ Psychiatric: AxOx3, judgement and insight normal; moving all extremities  Skin: no rashes; no palpable lesions    Labs: reviewed    Vancomycin Level, Trough: 9.7 ug/mL (11-02 @ 21:51  C-Reactive Protein, Serum: 114 mg/L (10-31-23 @ 15:27)    Vancomycin Level, Trough: 11.6 ug/mL (11-01 @ 20:07)                        11.1   12.47 )-----------( 249      ( 01 Nov 2023 08:04 )             32.8     11-01    136  |  100  |  9   ----------------------------<  246<H>  3.4<L>   |  28  |  0.59    Ca    8.5      01 Nov 2023 08:04    TPro  5.9<L>  /  Alb  1.9<L>  /  TBili  0.6  /  DBili  x   /  AST  12<L>  /  ALT  18  /  AlkPhos  96  11-01    Vancomycin Level, Trough: 11.6 ug/mL (11-01 @ 20:07)  C-Reactive Protein, Serum: 114 mg/L (10-31-23 @ 15:27)                        11.1   12.47 )-----------( 249      ( 01 Nov 2023 08:04 )             32.8     11-01    136  |  100  |  9   ----------------------------<  246<H>  3.4<L>   |  28  |  0.59    Ca    8.5      01 Nov 2023 08:04    TPro  5.9<L>  /  Alb  1.9<L>  /  TBili  0.6  /  DBili  x   /  AST  12<L>  /  ALT  18  /  AlkPhos  96  11-01     LIVER FUNCTIONS - ( 01 Nov 2023 08:04 )  Alb: 1.9 g/dL / Pro: 5.9 gm/dL / ALK PHOS: 96 U/L / ALT: 18 U/L / AST: 12 U/L / GGT: x           Culture - Fungal, Tissue (collected 01 Nov 2023 12:19)  Source: .Tissue RIGHT GREAT TOE PROXIMAL PHALANX BONE CX  Preliminary Report (02 Nov 2023 10:17):    Testing in progress    Culture - Acid Fast - Tissue w/Smear (collected 01 Nov 2023 12:19)  Source: .Tissue RIGHT GREAT TOE PROXIMAL PHALANX BONE CX    Culture - Tissue with Gram Stain (collected 01 Nov 2023 12:19)  Source: .Tissue RIGHT GREAT TOE PROXIMAL PHALANX BONE CX  Gram Stain (01 Nov 2023 21:57):    Few polymorphonuclear leukocytes per low power field    Few Gram positive cocci in pairs per oil power field  Preliminary Report (02 Nov 2023 23:43):    Few Streptococcus anginosus    "Susceptibilities not performed"    Culture - Fungal, Other (collected 01 Nov 2023 12:19)  Source: .Other RIGHT GREAT TOE PROXIMAL PHALANX BONE CX  Preliminary Report (02 Nov 2023 10:09):    Testing in progress    Culture - Surgical Swab (collected 01 Nov 2023 12:19)  Source: .Surgical Swab RIGHT GREAT TOE PROXIMAL PHALANX BONE CX  Preliminary Report (02 Nov 2023 23:44):    Moderate Streptococcus anginosus    "Susceptibilities not performed"    Culture - Blood (collected 31 Oct 2023 15:39)  Source: .Blood None  Gram Stain (01 Nov 2023 16:58):    Growth in aerobic bottle: Gram positive cocci in pairs    Growth in anaerobic bottle: Gram positive cocci in pairs  Final Report (02 Nov 2023 10:44):    Growth in aerobic and anaerobic bottles: Streptococcus mitis/oralis group    "Susceptibilities not performed"    Direct identification is available within approximately 3-5    hours either by Blood Panel Multiplexed PCR or Direct    MALDI-TOF. Details: [email or web address]  Organism: Blood Culture PCR (02 Nov 2023 10:44)  Organism: Blood Culture PCR (02 Nov 2023 10:44)      Method Type: PCR      -  Streptococcus sp. (Not Grp A, B or S pneumoniae): Detec    Culture - Blood (collected 31 Oct 2023 15:27)  Source: .Blood None  Gram Stain (01 Nov 2023 15:06):    Growth in aerobic bottle: Gram positive cocci in pairs    Growth in anaerobic bottle: Gram positive cocci in pairs  Final Report (03 Nov 2023 07:30):    Growth in aerobic and anaerobic bottles: Streptococcus mitis/oralis group  Organism: Streptococcus mitis/oralis group (03 Nov 2023 07:30)  Organism: Streptococcus mitis/oralis group (03 Nov 2023 07:30)      Method Type: MARIBEL      -  Ceftriaxone: S <=0.25      -  Penicillin: S 0.06      -  Vancomycin: S 1    Culture - Other (collected 31 Oct 2023 14:20)  Source: .Other wound on right big toe  Final Report (02 Nov 2023 18:06):    Numerous Streptococcus anginosus "Susceptibilities not performed"    Normal skin jones isolated    Culture - Blood (collected 02 Nov 2023 08:47)  Source: .Blood None  Preliminary Report (03 Nov 2023 13:01):    No growth at 24 hours    Culture - Blood (collected 02 Nov 2023 08:46)  Source: .Blood None  Preliminary Report (03 Nov 2023 13:01):    No growth at 24 hours    Radiology: all available radiological tests reviewed    < from: MR Foot w/wo IV Cont, Right (10.31.23 @ 17:48) >  1.  Extensive osseous destruction of the 1st toe is again seen consistent   with osteomyelitis.  2.  Faint marrow edema of the 1st metatarsal head and hallux sesamoids   may be reactive in the absence of confluent T1 marrow replacement,   however, early infection cannot be absolutely excluded.    < end of copied text >      Advanced directives addressed: full resuscitation
Date of service: 11-06-23 @ 13:32    Sitting in bed in NAD  Has right foot discomfort  No fever    ROS: no fever or chills; denies dizziness, no HA, no SOB or cough, no abdominal pain, no diarrhea or constipation; no dysuria, no rashes    MEDICATIONS  (STANDING):  atorvastatin 10 milliGRAM(s) Oral at bedtime  cefTRIAXone Injectable. 2000 milliGRAM(s) IV Push every 24 hours  dextrose 5%. 1000 milliLiter(s) (50 mL/Hr) IV Continuous <Continuous>  dextrose 5%. 1000 milliLiter(s) (100 mL/Hr) IV Continuous <Continuous>  dextrose 50% Injectable 25 Gram(s) IV Push once  dextrose 50% Injectable 12.5 Gram(s) IV Push once  dextrose 50% Injectable 25 Gram(s) IV Push once  glucagon  Injectable 1 milliGRAM(s) IntraMuscular once  insulin glargine Injectable (LANTUS) 16 Unit(s) SubCutaneous at bedtime  insulin lispro (ADMELOG) corrective regimen sliding scale   SubCutaneous three times a day before meals  insulin lispro (ADMELOG) corrective regimen sliding scale   SubCutaneous at bedtime  lactobacillus acidophilus 1 Tablet(s) Oral three times a day with meals  sodium chloride 0.9% lock flush 3 milliLiter(s) IV Push every 8 hours    Vital Signs Last 24 Hrs  T(C): 36.4 (06 Nov 2023 08:55), Max: 36.8 (05 Nov 2023 15:55)  T(F): 97.5 (06 Nov 2023 08:55), Max: 98.3 (05 Nov 2023 15:55)  HR: 69 (06 Nov 2023 08:55) (69 - 83)  BP: 142/74 (06 Nov 2023 08:55) (110/57 - 142/74)  BP(mean): --  RR: 18 (06 Nov 2023 08:55) (17 - 18)  SpO2: 95% (06 Nov 2023 08:55) (95% - 99%)    Parameters below as of 06 Nov 2023 08:55  Patient On (Oxygen Delivery Method): room air     Physical exam:    Constitutional:  No acute distress  HEENT: NC/AT, EOMI, PERRLA, conjunctivae clear; ears and nose atraumatic  Neck: supple; thyroid not palpable  Back: no tenderness  Respiratory: respiratory effort normal; clear to auscultation  Cardiovascular: S1S2 regular, no murmurs  Abdomen: soft, not tender, not distended, positive BS  Genitourinary: no suprapubic tenderness  Lymphatic: no LN palpable  Musculoskeletal: no muscle tenderness, no joint swelling or tenderness  Extremities: 2+ pedal edema  Right great toe ulcer s/p surgery  Right foot, right ankle, shin and calf erythema, edema and tenderness much improved  Neurological/ Psychiatric: AxOx3, judgement and insight normal; moving all extremities  Skin: no rashes; no palpable lesions    Labs: reviewed    Vancomycin Level, Trough: 9.7 ug/mL (11-02 @ 21:51  C-Reactive Protein, Serum: 114 mg/L (10-31-23 @ 15:27)    Vancomycin Level, Trough: 11.6 ug/mL (11-01 @ 20:07)                        11.1   12.47 )-----------( 249      ( 01 Nov 2023 08:04 )             32.8     11-01    136  |  100  |  9   ----------------------------<  246<H>  3.4<L>   |  28  |  0.59    Ca    8.5      01 Nov 2023 08:04    TPro  5.9<L>  /  Alb  1.9<L>  /  TBili  0.6  /  DBili  x   /  AST  12<L>  /  ALT  18  /  AlkPhos  96  11-01    Vancomycin Level, Trough: 11.6 ug/mL (11-01 @ 20:07)  C-Reactive Protein, Serum: 114 mg/L (10-31-23 @ 15:27)                        11.1   12.47 )-----------( 249      ( 01 Nov 2023 08:04 )             32.8     11-01    136  |  100  |  9   ----------------------------<  246<H>  3.4<L>   |  28  |  0.59    Ca    8.5      01 Nov 2023 08:04    TPro  5.9<L>  /  Alb  1.9<L>  /  TBili  0.6  /  DBili  x   /  AST  12<L>  /  ALT  18  /  AlkPhos  96  11-01     LIVER FUNCTIONS - ( 01 Nov 2023 08:04 )  Alb: 1.9 g/dL / Pro: 5.9 gm/dL / ALK PHOS: 96 U/L / ALT: 18 U/L / AST: 12 U/L / GGT: x           Culture - Fungal, Tissue (collected 01 Nov 2023 12:19)  Source: .Tissue RIGHT GREAT TOE PROXIMAL PHALANX BONE CX  Preliminary Report (02 Nov 2023 10:17):    Testing in progress    Culture - Acid Fast - Tissue w/Smear (collected 01 Nov 2023 12:19)  Source: .Tissue RIGHT GREAT TOE PROXIMAL PHALANX BONE CX    Culture - Tissue with Gram Stain (collected 01 Nov 2023 12:19)  Source: .Tissue RIGHT GREAT TOE PROXIMAL PHALANX BONE CX  Gram Stain (01 Nov 2023 21:57):    Few polymorphonuclear leukocytes per low power field    Few Gram positive cocci in pairs per oil power field  Preliminary Report (02 Nov 2023 23:43):    Few Streptococcus anginosus    "Susceptibilities not performed"    Culture - Fungal, Other (collected 01 Nov 2023 12:19)  Source: .Other RIGHT GREAT TOE PROXIMAL PHALANX BONE CX  Preliminary Report (02 Nov 2023 10:09):    Testing in progress    Culture - Surgical Swab (collected 01 Nov 2023 12:19)  Source: .Surgical Swab RIGHT GREAT TOE PROXIMAL PHALANX BONE CX  Preliminary Report (02 Nov 2023 23:44):    Moderate Streptococcus anginosus    "Susceptibilities not performed"    Culture - Blood (collected 31 Oct 2023 15:39)  Source: .Blood None  Gram Stain (01 Nov 2023 16:58):    Growth in aerobic bottle: Gram positive cocci in pairs    Growth in anaerobic bottle: Gram positive cocci in pairs  Final Report (02 Nov 2023 10:44):    Growth in aerobic and anaerobic bottles: Streptococcus mitis/oralis group    "Susceptibilities not performed"    Direct identification is available within approximately 3-5    hours either by Blood Panel Multiplexed PCR or Direct    MALDI-TOF. Details: [email or web address]  Organism: Blood Culture PCR (02 Nov 2023 10:44)  Organism: Blood Culture PCR (02 Nov 2023 10:44)      Method Type: PCR      -  Streptococcus sp. (Not Grp A, B or S pneumoniae): Detec    Culture - Blood (collected 31 Oct 2023 15:27)  Source: .Blood None  Gram Stain (01 Nov 2023 15:06):    Growth in aerobic bottle: Gram positive cocci in pairs    Growth in anaerobic bottle: Gram positive cocci in pairs  Final Report (03 Nov 2023 07:30):    Growth in aerobic and anaerobic bottles: Streptococcus mitis/oralis group  Organism: Streptococcus mitis/oralis group (03 Nov 2023 07:30)  Organism: Streptococcus mitis/oralis group (03 Nov 2023 07:30)      Method Type: MARIBEL      -  Ceftriaxone: S <=0.25      -  Penicillin: S 0.06      -  Vancomycin: S 1    Culture - Other (collected 31 Oct 2023 14:20)  Source: .Other wound on right big toe  Final Report (02 Nov 2023 18:06):    Numerous Streptococcus anginosus "Susceptibilities not performed"    Normal skin jones isolated    Culture - Blood (collected 02 Nov 2023 08:47)  Source: .Blood None  Preliminary Report (03 Nov 2023 13:01):    No growth at 24 hours    Culture - Blood (collected 02 Nov 2023 08:46)  Source: .Blood None  Preliminary Report (03 Nov 2023 13:01):    No growth at 24 hours    Radiology: all available radiological tests reviewed    < from: MR Foot w/wo IV Cont, Right (10.31.23 @ 17:48) >  1.  Extensive osseous destruction of the 1st toe is again seen consistent   with osteomyelitis.  2.  Faint marrow edema of the 1st metatarsal head and hallux sesamoids   may be reactive in the absence of confluent T1 marrow replacement,   however, early infection cannot be absolutely excluded.    < end of copied text >      Advanced directives addressed: full resuscitation
Patient is a 63y old  Female who presents with a chief complaint of foot ulcer (05 Nov 2023 14:13)      HPI:  HPI:  63F w/no sig PMH, presents c/o infection of Right first toe w/ malodorous drainage occurring over past several weeks. She's been sent in by her podiatrist for IV abx for osteomyelitis. Pt denies any pain, fever/chills, n/v/d, abd pain, dysuria, cp, sob.  Pt states this started after getting a pedicure. She has not been to any doctor for many years.     In ED, given zosyn, vanco, seen by podiatry (note reviewed), gluc 325, Na 129, temp 99, HR 100s, sbp 140s  MRI Rt foot 1.  Extensive osseous destruction of the 1st toe is again seen consistent with osteomyelitis. 2.  Faint marrow edema of the 1st metatarsal head and hallux sesamoids   may be reactive in the absence of confluent T1 marrow replacement, however, early infection cannot be absolutely excluded.    LE doppler b/l NEG for dvt    PAST MEDICAL & SURGICAL HISTORY:  no sig PMH/PSH    Review of Systems:   CONSTITUTIONAL: No fever.  EYES: No eye pain or discharge.  ENMT:  No sinus or throat pain  NECK: No pain or stiffness  RESPIRATORY: No cough, wheezing, chills or hemoptysis; No shortness of breath  CARDIOVASCULAR: No chest pain, palpitations, dizziness, or leg swelling  GASTROINTESTINAL: No abdominal or epigastric pain. No nausea, vomiting, or hematemesis; No diarrhea or constipation. No melena or hematochezia.  GENITOURINARY: No dysuria or incontinence  NEUROLOGICAL: No headaches, memory loss, loss of strength, numbness, or tremors  SKIN: No rashes.  MUSCULOSKELETAL: No joint pain or swelling; No muscle, back, or extremity pain ++ see hpi   PSYCHIATRIC: No depression, anxiety, mood swings, or difficulty sleeping    Social History:   denies smoking/etoh/drug use  lives alone, ind ADLs    FAMILY HISTORY:  pt denies       MEDICATIONS  (STANDING):  dextrose 5%. 1000 milliLiter(s) (100 mL/Hr) IV Continuous <Continuous>  dextrose 5%. 1000 milliLiter(s) (50 mL/Hr) IV Continuous <Continuous>  dextrose 50% Injectable 12.5 Gram(s) IV Push once  dextrose 50% Injectable 25 Gram(s) IV Push once  dextrose 50% Injectable 25 Gram(s) IV Push once  glucagon  Injectable 1 milliGRAM(s) IntraMuscular once  insulin glargine Injectable (LANTUS) 8 Unit(s) SubCutaneous at bedtime  insulin lispro (ADMELOG) corrective regimen sliding scale   SubCutaneous at bedtime  insulin lispro (ADMELOG) corrective regimen sliding scale   SubCutaneous three times a day before meals  piperacillin/tazobactam IVPB.. 3.375 Gram(s) IV Intermittent once    MEDICATIONS  (PRN):  acetaminophen     Tablet .. 650 milliGRAM(s) Oral every 6 hours PRN Temp greater or equal to 38C (100.4F), Mild Pain (1 - 3)  aluminum hydroxide/magnesium hydroxide/simethicone Suspension 30 milliLiter(s) Oral every 4 hours PRN Dyspepsia  dextrose Oral Gel 15 Gram(s) Oral once PRN Blood Glucose LESS THAN 70 milliGRAM(s)/deciliter  melatonin 3 milliGRAM(s) Oral at bedtime PRN Insomnia  ondansetron Injectable 4 milliGRAM(s) IV Push every 8 hours PRN Nausea and/or Vomiting      PHYSICAL EXAM:  Vital Signs Last 24 Hrs  T(C): 37.4 (31 Oct 2023 18:12), Max: 37.4 (31 Oct 2023 18:12)  T(F): 99.3 (31 Oct 2023 18:12), Max: 99.3 (31 Oct 2023 18:12)  HR: 94 (31 Oct 2023 18:12) (94 - 104)  BP: 144/69 (31 Oct 2023 18:12) (140/72 - 144/69)  BP(mean): 88 (31 Oct 2023 13:49) (88 - 88)  RR: 18 (31 Oct 2023 18:12) (18 - 18)  SpO2: 96% (31 Oct 2023 18:12) (96% - 98%)    Parameters below as of 31 Oct 2023 18:12  Patient On (Oxygen Delivery Method): room air  GENERAL: NAD,   HEAD:  Atraumatic, Normocephalic  EYES: EOMI, PERRLA, conjunctiva and sclera clear  ENT: normal hearing, no nasal discharge, throat clear, dentition normal  NECK: Supple, No JVD, no LAD, no thyromegaly   CHEST/LUNG: Clear to auscultation bilaterally; No wheeze, respirations unlabored  HEART: Regular rate and rhythm; No murmurs, rubs, or gallops  ABDOMEN: Soft, Nontender, Nondistended; Bowel sounds present, no HSM  EXTREMITIES:  dec Peripheral Pulses of LE, No clubbing, cyanosis, +mild  edema RLE, RT first toe w/ swelling/erythema/draining ulcer  PSYCH: AAOx3, normal behavior  NEUROLOGY: non-focal, sensory and cn 2-12 intact, speech/language intact  SKIN: No visible rashes or lesions    LABS:                        12.4   16.24 )-----------( 313      ( 31 Oct 2023 15:27 )             37.2     10-31    129<L>  |  95<L>  |  12  ----------------------------<  325<H>  4.0   |  25  |  0.87    Ca    8.9      31 Oct 2023 15:27    TPro  7.4  /  Alb  2.4<L>  /  TBili  0.8  /  DBili  x   /  AST  12<L>  /  ALT  23  /  AlkPhos  111  10-31    PT/INR - ( 31 Oct 2023 15:27 )   PT: 13.7 sec;   INR: 1.22 ratio         PTT - ( 31 Oct 2023 15:27 )  PTT:31.7 sec      Urinalysis Basic - ( 31 Oct 2023 15:27 )    Color: x / Appearance: x / SG: x / pH: x  Gluc: 325 mg/dL / Ketone: x  / Bili: x / Urobili: x   Blood: x / Protein: x / Nitrite: x   Leuk Esterase: x / RBC: x / WBC x   Sq Epi: x / Non Sq Epi: x / Bacteria: x        RADIOLOGY & ADDITIONAL TESTS:    Imaging Personally Reviewed:  MRI Rt foot 1.  Extensive osseous destruction of the 1st toe is again seen consistent with osteomyelitis. 2.  Faint marrow edema of the 1st metatarsal head and hallux sesamoids   may be reactive in the absence of confluent T1 marrow replacement, however, early infection cannot be absolutely excluded.    LE doppler b/l NEG for dvt    EKG Personally Reviewed:    Consultant(s) Notes Reviewed:      Care Discussed with Consultants/Other Providers:   (31 Oct 2023 19:32)      Allergies    No Known Allergies    Intolerances        MEDICATIONS  (STANDING):  atorvastatin 10 milliGRAM(s) Oral at bedtime  cefTRIAXone Injectable. 2000 milliGRAM(s) IV Push every 24 hours  dextrose 5%. 1000 milliLiter(s) (50 mL/Hr) IV Continuous <Continuous>  dextrose 5%. 1000 milliLiter(s) (100 mL/Hr) IV Continuous <Continuous>  dextrose 50% Injectable 25 Gram(s) IV Push once  dextrose 50% Injectable 12.5 Gram(s) IV Push once  dextrose 50% Injectable 25 Gram(s) IV Push once  glucagon  Injectable 1 milliGRAM(s) IntraMuscular once  insulin glargine Injectable (LANTUS) 16 Unit(s) SubCutaneous at bedtime  insulin lispro (ADMELOG) corrective regimen sliding scale   SubCutaneous three times a day before meals  insulin lispro (ADMELOG) corrective regimen sliding scale   SubCutaneous at bedtime  lactobacillus acidophilus 1 Tablet(s) Oral three times a day with meals  sodium chloride 0.9% lock flush 3 milliLiter(s) IV Push every 8 hours    MEDICATIONS  (PRN):  acetaminophen     Tablet .. 650 milliGRAM(s) Oral every 6 hours PRN Temp greater or equal to 38C (100.4F), Mild Pain (1 - 3)  aluminum hydroxide/magnesium hydroxide/simethicone Suspension 30 milliLiter(s) Oral every 4 hours PRN Dyspepsia  dextrose Oral Gel 15 Gram(s) Oral once PRN Blood Glucose LESS THAN 70 milliGRAM(s)/deciliter  melatonin 3 milliGRAM(s) Oral at bedtime PRN Insomnia  ondansetron Injectable 4 milliGRAM(s) IV Push every 8 hours PRN Nausea and/or Vomiting        RADIOLOGY    Culture - Blood in AM (11.02.23 @ 08:47)   Specimen Source: .Blood None  Culture Results:   No growth at 72 Hours                
Patient is a 63y old  Female who presents with a chief complaint of foot ulcer (06 Nov 2023 14:50)      HPI:  HPI:  63F w/no sig PMH, presents c/o infection of Right first toe w/ malodorous drainage occurring over past several weeks. She's been sent in by her podiatrist for IV abx for osteomyelitis. Pt denies any pain, fever/chills, n/v/d, abd pain, dysuria, cp, sob.  Pt states this started after getting a pedicure. She has not been to any doctor for many years.     In ED, given zosyn, vanco, seen by podiatry (note reviewed), gluc 325, Na 129, temp 99, HR 100s, sbp 140s  MRI Rt foot 1.  Extensive osseous destruction of the 1st toe is again seen consistent with osteomyelitis. 2.  Faint marrow edema of the 1st metatarsal head and hallux sesamoids   may be reactive in the absence of confluent T1 marrow replacement, however, early infection cannot be absolutely excluded.    LE doppler b/l NEG for dvt    PAST MEDICAL & SURGICAL HISTORY:  no sig PMH/PSH    Review of Systems:   CONSTITUTIONAL: No fever.  EYES: No eye pain or discharge.  ENMT:  No sinus or throat pain  NECK: No pain or stiffness  RESPIRATORY: No cough, wheezing, chills or hemoptysis; No shortness of breath  CARDIOVASCULAR: No chest pain, palpitations, dizziness, or leg swelling  GASTROINTESTINAL: No abdominal or epigastric pain. No nausea, vomiting, or hematemesis; No diarrhea or constipation. No melena or hematochezia.  GENITOURINARY: No dysuria or incontinence  NEUROLOGICAL: No headaches, memory loss, loss of strength, numbness, or tremors  SKIN: No rashes.  MUSCULOSKELETAL: No joint pain or swelling; No muscle, back, or extremity pain ++ see hpi   PSYCHIATRIC: No depression, anxiety, mood swings, or difficulty sleeping    Social History:   denies smoking/etoh/drug use  lives alone, ind ADLs    FAMILY HISTORY:  pt denies       MEDICATIONS  (STANDING):  dextrose 5%. 1000 milliLiter(s) (100 mL/Hr) IV Continuous <Continuous>  dextrose 5%. 1000 milliLiter(s) (50 mL/Hr) IV Continuous <Continuous>  dextrose 50% Injectable 12.5 Gram(s) IV Push once  dextrose 50% Injectable 25 Gram(s) IV Push once  dextrose 50% Injectable 25 Gram(s) IV Push once  glucagon  Injectable 1 milliGRAM(s) IntraMuscular once  insulin glargine Injectable (LANTUS) 8 Unit(s) SubCutaneous at bedtime  insulin lispro (ADMELOG) corrective regimen sliding scale   SubCutaneous at bedtime  insulin lispro (ADMELOG) corrective regimen sliding scale   SubCutaneous three times a day before meals  piperacillin/tazobactam IVPB.. 3.375 Gram(s) IV Intermittent once    MEDICATIONS  (PRN):  acetaminophen     Tablet .. 650 milliGRAM(s) Oral every 6 hours PRN Temp greater or equal to 38C (100.4F), Mild Pain (1 - 3)  aluminum hydroxide/magnesium hydroxide/simethicone Suspension 30 milliLiter(s) Oral every 4 hours PRN Dyspepsia  dextrose Oral Gel 15 Gram(s) Oral once PRN Blood Glucose LESS THAN 70 milliGRAM(s)/deciliter  melatonin 3 milliGRAM(s) Oral at bedtime PRN Insomnia  ondansetron Injectable 4 milliGRAM(s) IV Push every 8 hours PRN Nausea and/or Vomiting      PHYSICAL EXAM:  Vital Signs Last 24 Hrs  T(C): 37.4 (31 Oct 2023 18:12), Max: 37.4 (31 Oct 2023 18:12)  T(F): 99.3 (31 Oct 2023 18:12), Max: 99.3 (31 Oct 2023 18:12)  HR: 94 (31 Oct 2023 18:12) (94 - 104)  BP: 144/69 (31 Oct 2023 18:12) (140/72 - 144/69)  BP(mean): 88 (31 Oct 2023 13:49) (88 - 88)  RR: 18 (31 Oct 2023 18:12) (18 - 18)  SpO2: 96% (31 Oct 2023 18:12) (96% - 98%)    Parameters below as of 31 Oct 2023 18:12  Patient On (Oxygen Delivery Method): room air  GENERAL: NAD,   HEAD:  Atraumatic, Normocephalic  EYES: EOMI, PERRLA, conjunctiva and sclera clear  ENT: normal hearing, no nasal discharge, throat clear, dentition normal  NECK: Supple, No JVD, no LAD, no thyromegaly   CHEST/LUNG: Clear to auscultation bilaterally; No wheeze, respirations unlabored  HEART: Regular rate and rhythm; No murmurs, rubs, or gallops  ABDOMEN: Soft, Nontender, Nondistended; Bowel sounds present, no HSM  EXTREMITIES:  dec Peripheral Pulses of LE, No clubbing, cyanosis, +mild  edema RLE, RT first toe w/ swelling/erythema/draining ulcer  PSYCH: AAOx3, normal behavior  NEUROLOGY: non-focal, sensory and cn 2-12 intact, speech/language intact  SKIN: No visible rashes or lesions    LABS:                        12.4   16.24 )-----------( 313      ( 31 Oct 2023 15:27 )             37.2     10-31    129<L>  |  95<L>  |  12  ----------------------------<  325<H>  4.0   |  25  |  0.87    Ca    8.9      31 Oct 2023 15:27    TPro  7.4  /  Alb  2.4<L>  /  TBili  0.8  /  DBili  x   /  AST  12<L>  /  ALT  23  /  AlkPhos  111  10-31    PT/INR - ( 31 Oct 2023 15:27 )   PT: 13.7 sec;   INR: 1.22 ratio         PTT - ( 31 Oct 2023 15:27 )  PTT:31.7 sec      Urinalysis Basic - ( 31 Oct 2023 15:27 )    Color: x / Appearance: x / SG: x / pH: x  Gluc: 325 mg/dL / Ketone: x  / Bili: x / Urobili: x   Blood: x / Protein: x / Nitrite: x   Leuk Esterase: x / RBC: x / WBC x   Sq Epi: x / Non Sq Epi: x / Bacteria: x        RADIOLOGY & ADDITIONAL TESTS:    Imaging Personally Reviewed:  MRI Rt foot 1.  Extensive osseous destruction of the 1st toe is again seen consistent with osteomyelitis. 2.  Faint marrow edema of the 1st metatarsal head and hallux sesamoids   may be reactive in the absence of confluent T1 marrow replacement, however, early infection cannot be absolutely excluded.    LE doppler b/l NEG for dvt    EKG Personally Reviewed:    Consultant(s) Notes Reviewed:      Care Discussed with Consultants/Other Providers:   (31 Oct 2023 19:32)      Allergies    No Known Allergies    Intolerances        MEDICATIONS  (STANDING):  atorvastatin 10 milliGRAM(s) Oral at bedtime  cefTRIAXone Injectable. 2000 milliGRAM(s) IV Push every 24 hours  dextrose 5%. 1000 milliLiter(s) (50 mL/Hr) IV Continuous <Continuous>  dextrose 5%. 1000 milliLiter(s) (100 mL/Hr) IV Continuous <Continuous>  dextrose 50% Injectable 25 Gram(s) IV Push once  dextrose 50% Injectable 12.5 Gram(s) IV Push once  dextrose 50% Injectable 25 Gram(s) IV Push once  glucagon  Injectable 1 milliGRAM(s) IntraMuscular once  insulin glargine Injectable (LANTUS) 16 Unit(s) SubCutaneous at bedtime  insulin lispro (ADMELOG) corrective regimen sliding scale   SubCutaneous three times a day before meals  insulin lispro (ADMELOG) corrective regimen sliding scale   SubCutaneous at bedtime  lactobacillus acidophilus 1 Tablet(s) Oral three times a day with meals  sodium chloride 0.9% lock flush 3 milliLiter(s) IV Push every 8 hours    MEDICATIONS  (PRN):  acetaminophen     Tablet .. 650 milliGRAM(s) Oral every 6 hours PRN Temp greater or equal to 38C (100.4F), Mild Pain (1 - 3)  aluminum hydroxide/magnesium hydroxide/simethicone Suspension 30 milliLiter(s) Oral every 4 hours PRN Dyspepsia  dextrose Oral Gel 15 Gram(s) Oral once PRN Blood Glucose LESS THAN 70 milliGRAM(s)/deciliter  melatonin 3 milliGRAM(s) Oral at bedtime PRN Insomnia  ondansetron Injectable 4 milliGRAM(s) IV Push every 8 hours PRN Nausea and/or Vomiting        RADIOLOGY          < from: Xray Chest 1 View- PORTABLE-Urgent (Xray Chest 1 View- PORTABLE-Urgent .) (11.06.23 @ 12:32) >    ACC: [id number] EXAM:  XR CHEST PORTABLE URGENT 1V   ORDERED BY: VINOD CALLAHAN     PROCEDURE DATE:  11/06/2023          INTERPRETATION:  CLINICAL INFORMATION: Line placement    AP chest radiograph from 1157 hours    COMPARISON: October 31, 2023    FINDINGS: Right arm PICC catheter with tip at the cavoatrial junction.   Lungs are clear. No pneumothorax. Cardiac and mediastinal contours are   unremarkable.    IMPRESSION:    Right arm PICC catheter with tip at the cavoatrial junction.    --- End of Report ---            MACHO SALAZAR MD; Attending Radiologist  This document has been electronically signed. Nov 6 2023  1:55PM    < end of copied text >            
CHIEF COMPLAINT: Patient is a 63y old  Female who presents with a chief complaint of foot ulcer (01 Nov 2023 09:43)    FROM H&P: 63F w/no sig PMH, presents c/o infection of Right first toe w/ malodorous drainage occurring over past several weeks. She's been sent in by her podiatrist for IV abx for osteomyelitis. Pt denies any pain, fever/chills, n/v/d, abd pain, dysuria, cp, sob.  Pt states this started after getting a pedicure. She has not been to any doctor for many years.   -- In ED, given zosyn, vanco, seen by podiatry (note reviewed), gluc 325, Na 129, temp 99, HR 100s, sbp 140s  MRI Rt foot 1.  Extensive osseous destruction of the 1st toe is again seen consistent with osteomyelitis. 2.  Faint marrow edema of the 1st metatarsal head and hallux sesamoids   may be reactive in the absence of confluent T1 marrow replacement, however, early infection cannot be absolutely excluded.  -- LE doppler b/l NEG for dvt    11/1. Denies hx of vascular intervention  Warm to touch extremities. Plan for arterial doppler    11/2. s/p OR w/ amp    PAST MEDICAL & SURGICAL HISTORY:  no sig PMH/PSH    FAMILY HISTORY:  denies smoking/etoh/drug use  lives alone, ind ADLs    FAMILY HISTORY:  pt denies     MEDICATIONS  (STANDING):  cefTRIAXone Injectable. 2000 milliGRAM(s) IV Push every 24 hours  dextrose 5%. 1000 milliLiter(s) (100 mL/Hr) IV Continuous <Continuous>  dextrose 5%. 1000 milliLiter(s) (50 mL/Hr) IV Continuous <Continuous>  dextrose 50% Injectable 12.5 Gram(s) IV Push once  dextrose 50% Injectable 25 Gram(s) IV Push once  dextrose 50% Injectable 25 Gram(s) IV Push once  glucagon  Injectable 1 milliGRAM(s) IntraMuscular once  insulin glargine Injectable (LANTUS) 8 Unit(s) SubCutaneous at bedtime  insulin lispro (ADMELOG) corrective regimen sliding scale   SubCutaneous at bedtime  insulin lispro (ADMELOG) corrective regimen sliding scale   SubCutaneous three times a day before meals  sodium chloride 0.9% lock flush 3 milliLiter(s) IV Push every 8 hours  sodium chloride 0.9%. 1000 milliLiter(s) (125 mL/Hr) IV Continuous <Continuous>  vancomycin  IVPB 1000 milliGRAM(s) IV Intermittent every 12 hours    MEDICATIONS  (PRN):  acetaminophen     Tablet .. 650 milliGRAM(s) Oral every 6 hours PRN Temp greater or equal to 38C (100.4F), Mild Pain (1 - 3)  aluminum hydroxide/magnesium hydroxide/simethicone Suspension 30 milliLiter(s) Oral every 4 hours PRN Dyspepsia  dextrose Oral Gel 15 Gram(s) Oral once PRN Blood Glucose LESS THAN 70 milliGRAM(s)/deciliter  melatonin 3 milliGRAM(s) Oral at bedtime PRN Insomnia  ondansetron Injectable 4 milliGRAM(s) IV Push every 8 hours PRN Nausea and/or Vomiting    PHYSICAL EXAM:  T(C): 37.2 (02 Nov 2023 08:20), Max: 37.7 (01 Nov 2023 23:22)  T(F): 99 (02 Nov 2023 08:20), Max: 99.8 (01 Nov 2023 23:22)  HR: 88 (02 Nov 2023 08:20) (68 - 93)  BP: 127/53 (02 Nov 2023 08:20) (103/48 - 152/69)  RR: 20 (02 Nov 2023 08:20) (14 - 20)  SpO2: 95% (02 Nov 2023 08:20) (94% - 100%)    Parameters below as of 02 Nov 2023 08:20  Patient On (Oxygen Delivery Method): room air      Constitutional: NAD, awake and alert  HEENT: PERR, EOMI, Normal Hearing, MMM  Neck: Soft and supple, No LAD, No JVD  Respiratory: Breath sounds are clear bilaterally, No wheezing, rales or rhonchi  Cardiovascular: S1 and S2, regular rate and rhythm, no Murmurs, gallops or rubs  Gastrointestinal: Bowel Sounds present, soft, nontender, nondistended, no guarding, no rebound  Extremities: No peripheral edema  Vascular: 2+ peripheral pulses  Neurological: A/O x 3, no focal deficits  Musculoskeletal: 5/5 strength b/l upper and lower extremities  Skin: RT foot ulcers/erythema - ACE in place  CVI - R>L    =======================================    LABS:             LABS: All Labs Reviewed:                        11.1   12.47 )-----------( 249      ( 01 Nov 2023 08:04 )             32.8     11-01    136  |  100  |  9   ----------------------------<  246<H>  3.4<L>   |  28  |  0.59    Ca    8.5      01 Nov 2023 08:04    TPro  5.9<L>  /  Alb  1.9<L>  /  TBili  0.6  /  DBili  x   /  AST  12<L>  /  ALT  18  /  AlkPhos  96  11-01    PT/INR - ( 31 Oct 2023 15:27 )   PT: 13.7 sec;   INR: 1.22 ratio         PTT - ( 31 Oct 2023 15:27 )  PTT:31.7 sec                    ECG:   Ventricular Rate 92 BPM    Atrial Rate 92 BPM    P-R Interval 134 ms    QRS Duration 84 ms    Q-T Interval 340 ms    QTC Calculation(Bazett) 420 ms    P Axis 63 degrees    R Axis -31 degrees    T Axis 54 degrees    Diagnosis Line Normal sinus rhythm  Left axis deviation  Septal infarct , age undetermined  Abnormal ECG  No previous ECGs available  Confirmed by Nilton Pretty MD ([id number]) on 11/1/2023 10:28:25 AM        RADIOLOGY & ADDITIONAL STUDIES:    < from: MR Foot w/wo IV Cont, Right (10.31.23 @ 17:48) >  1.  Extensive osseous destruction of the 1st toe is again seen consistent   with osteomyelitis.  2.  Faint marrow edema of the 1st metatarsal head and hallux sesamoids   may be reactive in the absence of confluent T1 marrow replacement,   however, early infection cannot be absolutely excluded.    < end of copied text >

## 2023-11-09 NOTE — DISCHARGE NOTE NURSING/CASE MANAGEMENT/SOCIAL WORK - NSDCVIVACCINE_GEN_ALL_CORE_FT
Tdap; 05-Jan-2022 09:17; Kaylin Clancy (LOVE); [employer]; [id number] (Exp. Date: 09-Sep-2023); IntraMuscular; Deltoid Right.; 0.5 milliLiter(s); VIS (VIS Published: 09-May-2013, VIS Presented: 05-Jan-2022);

## 2023-11-09 NOTE — DISCHARGE NOTE NURSING/CASE MANAGEMENT/SOCIAL WORK - PATIENT PORTAL LINK FT
You can access the FollowMyHealth Patient Portal offered by [facility] by registering at the following website: [email or web address]. By joining [facility] FollowMyHealth portal, you will also be able to view your health information using other applications (apps) compatible with our system.

## 2023-11-09 NOTE — PROGRESS NOTE ADULT - PROVIDER SPECIALTY LIST ADULT
Hospitalist
Hospitalist
Infectious Disease
Hospitalist
Infectious Disease
Infectious Disease
Podiatry
Hospitalist
Infectious Disease
Infectious Disease
Podiatry
Hospitalist
Hospitalist
Podiatry
Vascular Cardiology
Vascular Cardiology

## 2023-11-09 NOTE — DISCHARGE NOTE NURSING/CASE MANAGEMENT/SOCIAL WORK - NSDCPEFALRISK_GEN_ALL_CORE
For information on Fall & Injury Prevention, visit: [email or web address] OR  [email or web address] OR  https://www.cdc.gov/steadi/patient.html

## 2023-11-09 NOTE — DISCHARGE NOTE PROVIDER - HOSPITAL COURSE
63 F w/no sig PMH, presents c/o infection of Right first toe w/ malodorous drainage occurring over past several weeks. She's been sent in by her podiatrist for IV abx for osteomyelitis. Pt denies any pain, fever/chills, n/v/d, abd pain, dysuria, cp, sob.  Pt states this started after getting a pedicure. She has not been to any doctor for many years.   In ED, given zosyn, vanco, seen by podiatry (note reviewed), gluc 325, Na 129, temp 99, HR 100s, sbp 140s  MRI Rt foot 1.  Extensive osseous destruction of the 1st toe is again seen consistent with osteomyelitis. 2.  Faint marrow edema of the 1st metatarsal head and hallux sesamoids   may be reactive in the absence of confluent T1 marrow replacement, however, early infection cannot be absolutely excluded.  LE doppler b/l NEG for dvt.  She was admitted to medical floor. She was seen by ID and also podiatry. She was started on IV antibiotic.   s/p Rt foot excisional debridement by podiatry. Her blood cultures were positive for strep viridans, her antibiotic was changed to IV rocephin.   2D echo was negative for any vegetations. Her repeat blood cultures were negative. She had PICC placement, waiting for rehab bed for longer IV antibiotic.  Her HGBA1C was >10.0 and started on lantus. Blood sugar much better now. Stable for discharge to rehab.       PHYSICAL EXAM:    GENERAL: NAD  HEENT:  NC/AT, EOMI, PERRLA, No scleral icterus, Moist mucous membranes  NECK: Supple, No JVD  CNS:  Alert & Oriented X3, Motor Strength 5/5 B/L upper and lower extremities; DTRs 2+ intact   LUNG: Normal Breath sounds, Clear to auscultation bilaterally, No rales, No rhonchi, No wheezing  HEART: RRR; No murmurs, No rubs  ABDOMEN: +BS, ST/ND/NT  GENITOURINARY: Voiding, Bladder not distended  EXTREMITIES: Rt foot bulky surgical dressing present   MUSCULOSKELTAL: Joints normal ROM, No TTP, No effusion          a/p:    1. Right foot OM involving 1st MT and 1st Toe:  2. Sepsis due to Streptococcus Viridans  bacteremia  -s/p excisional debridement by Podiatry   local wound care per surgery  TTE: normal EF, mild-mod TR  no evidence of vegetations  c/w Ceftriaxone   repeat Blood Cx: no growth  ID follow up: will need IV Abx x 6weeks via PICC    3. DM type II: uncontrolled  HGBA1C 11.2  c/w ADA  ISS  Continue Lantus     4. Hypokalemia:   repleted K    D/C to rehab once bed is available  Spent more than 30 min to prepare the discharge

## 2023-11-09 NOTE — PROGRESS NOTE ADULT - ASSESSMENT
63F w/no sig PMH, presents c/o infection of Right first toe w/ malodorous drainage occurring over past several weeks. She's been sent in by her podiatrist for IV abx for osteomyelitis. Pt denies any pain, fever/chills, n/v/d, abd pain, dysuria, cp, sob.  Pt states this started after getting a pedicure. She has not been to any doctor for many years. In ED, given zosyn, vanco, seen by podiatry (note reviewed), gluc 325, Na 129, temp 99, HR 100s, sbp 140s. MRI Rt foot 1.  Extensive osseous destruction of the 1st toe is again seen consistent with osteomyelitis. 2.  Faint marrow edema of the 1st metatarsal head and hallux sesamoids may be reactive in the absence of confluent T1 marrow replacement, however, early infection cannot be absolutely excluded. LE doppler b/l NEG for dvt    #Bacteremia / Sepsis   #Right great toe acute OM with surrounding right foot cellulitis.   #R/o PAD  #Chronic venous insufficiency   - Plan for arterial doppler for further evaluation (results pending)  - Podiatry and ID following  - On ABX per ID.          Case d/w Dr. Park
 63F w/no sig PMH, presents c/o infection of Right first toe w/ malodorous drainage occurring over past several weeks. She's been sent in by her podiatrist for IV abx for osteomyelitis. Pt denies any pain, fever/chills, n/v/d, abd pain, dysuria, cp, sob.  Pt states this started after getting a pedicure. She has not been to any doctor for many years. In ED, given zosyn, vanco, seen by podiatry (note reviewed), gluc 325, Na 129, temp 99, HR 100s, sbp 140s. MRI Rt foot 1.  Extensive osseous destruction of the 1st toe is again seen consistent with osteomyelitis. 2.  Faint marrow edema of the 1st metatarsal head and hallux sesamoids may be reactive in the absence of confluent T1 marrow replacement, however, early infection cannot be absolutely excluded. LE doppler b/l NEG for dvt    #Bacteremia / Sepsis   #Right great toe acute OM with surrounding right foot cellulitis. s/p Amp of Distal 1st metatarsal, sesamoids & Hallux on RT  #Chronic venous insufficiency   #PAD  #Uncontrolled DMT2 - a1c 11.6  - On ABX per ID. Eventual plan for PICC line and home IV abx   - OK to weight bear with Sx shoe for necessity. Keep dressing clean & dry do not change today. To be changed be Dr. Hilliard tomorrow  - Added low dose statin given DM2. Check lipid panel.  - Added probiotics      Case discussed/covering for Dr. Park and Dr. Hilliard.  
64 y/o Female with h/o nonhealing foot ulcer was admitted on 10/31 for infection of right first toe w/ malodorous drainage occurring over past several weeks. She's been sent in by her podiatrist for IV abx for osteomyelitis. Pt denies pain, fever/chills. Pt states this started after getting a pedicure. She has not been to any doctor for many years. In ED, she received zosyn, vanco IV. Overnight she is reported with bacterermia.    1. Bacteremia / Sepsis with strep oralis. Right great toe acute OM with strep anginosus. Right foot cellulitis. RLE cellulitis. Probable undiagnosed DM type 2.  -leukocytosis  -cultures reviewed  -s/p vancomycin 1 gm IV q12h # 3  -on ceftriaxone 2 gm IV qd # 4  -tolerating abx well so far; no side effects noted  -vancomycin trough level is therapeutic  -local foot care  -podiatry evaluation appreciated   -repeat BC x 2 are negative to date  -cardiac Echo noted  -f/u cultures   -continue abx coverage   -will need long term IV abx coverage   -plan for PICC line and home IV abx   -monitor temps  -f/u CBC  -supportive care  2. Other issues:   -care per medicine      
Alert afebrile Right foot looks good scant drainage bacteremia / strep recent Echo & blood Cult neg. Await PICC want to go to REHAB I go to [facility] Will F/U In AM THX 
Patient / PVD/Cellulitis and alix chronic osteomyelitis to the right Great Toe, Ray? Advise Admit Hospital Service Xrays R foot & MRI W/WO Edward R foot ID Consult Spoke with Dr Park who will do IC Consult. THX
Patient with leukocytosis / L shift Xray & MR consistent with osteomyelitis of distal right 1st ray and Great Toe / gas present Glu >400 Discussed with pt at length necessity for distal 1st ray amputation via KELVIN & that wound may not heal & require more Sx including proximal amputation BKA/AKA. Await ID & IC F/U For OR TODAY this afternoon. NPO to OR Pending med evaluation.
Alert afebrile Right foot w/o erythema scant edema & distal serous drainage. Has PICC R UE Apply Betadine & DSD. ABX as per Dr Kinney. For TVR to [facility] will F/U THx
POD # 1 Postop Right distal 1st Ray resection. Alert afebrile wound looks good marked reduction in edema/erythema. Drain out. No odor. Cult OR Strep AL 1.9 WBC 12.47 Apply DSD Will need PICC then D/C home THX
POD # 2 Alert afebrile Right foot markedly improved. Less edema, suture line in place. OR Cult Strep Postop Xrays clean resection of distal right 1st ray. AL 1.9 's ABX as per ID Awaiting PICC line then D/C home Spoke with Dr Park who will cover for me tomorrow. I will be away to [place] night 11[place]5. OK to weight bear with Sx shoe for necessity. Keep dressing clean & dry do not change. Will F/U Sun or As OP. THX
PODIATRIC SX: ADD Xrays were now available for review Right foot reveal destruction of proximal & distal phalanges of th Great Toe. No Gas. 
62 y/o Female with h/o nonhealing foot ulcer was admitted on 10/31 for infection of right first toe w/ malodorous drainage occurring over past several weeks. She's been sent in by her podiatrist for IV abx for osteomyelitis. Pt denies pain, fever/chills. Pt states this started after getting a pedicure. She has not been to any doctor for many years. In ED, she received zosyn, vanco IV. Overnight she is reported with bacterermia.    1. Bacteremia / Sepsis with strep oralis. Right great toe acute OM with strep anginosus. Right foot cellulitis. RLE cellulitis. Probable undiagnosed DM type 2.  -leukocytosis  -cultures reviewed  -s/p vancomycin 1 gm IV q12h # 3  -on ceftriaxone 2 gm IV qd # 6  -tolerating abx well so far; no side effects noted  -vancomycin trough level is therapeutic  -local foot care  -podiatry evaluation appreciated   -repeat BC x 2 are negative to date  -cardiac Echo noted  -f/u cultures   -continue abx coverage   -will need long term IV abx coverage for 6 weeks  -plan for PICC line and home IV abx   -home IV abx setup in progress; case reviewed with case management; orders reviewed and called in to pharmacist with infusion company; awaiting insurance approval  -f/u with podiatry as outpatient  -weekly labs  -monitor temps  -f/u CBC  -supportive care  2. Other issues:   -care per medicine      
Alert afebrile Right foot w/o erythema, scant edema suture line in place. 's BC neg OR Cult Strep Off Vanco On Ceftriaxone. Awaiting PICC & TVR to [facility]. Apply DSD. Wound Care Orders: Apply Betadine to wound with DSD Q day can weight bear for necessity / Sx shoe. THX
Alert afebrile Right foot with scant drainage to distal wound. No cellulitis / odor. Apply Betadine Sol & DSD. PICC in place. Await D/C home discussed with Pt can do wound care for her in office. ANUPAMA 
Alert afebrile Scant drainage on dressing R foot. Wound looks clean suture line in place. Apply Betadine to suture line & DSD. Unable to access rehab? For D/C home. Will do wound care in office. Will F/U TH X
62 y/o Female with h/o nonhealing foot ulcer was admitted on 10/31 for infection of right first toe w/ malodorous drainage occurring over past several weeks. She's been sent in by her podiatrist for IV abx for osteomyelitis. Pt denies pain, fever/chills. Pt states this started after getting a pedicure. She has not been to any doctor for many years. In ED, she received zosyn, vanco IV. Overnight she is reported with bacterermia.    1. Bacteremia / Sepsis with strep oralis. Right great toe acute OM with strep anginosus. Right foot cellulitis. RLE cellulitis. Probable undiagnosed DM type 2.  -leukocytosis  -cultures reviewed  -s/p vancomycin 1 gm IV q12h # 3  -on ceftriaxone 2 gm IV qd # 5  -tolerating abx well so far; no side effects noted  -vancomycin trough level is therapeutic  -local foot care  -podiatry evaluation appreciated   -repeat BC x 2 are negative to date  -cardiac Echo noted  -f/u cultures   -continue abx coverage   -will need long term IV abx coverage for 6 weeks  -plan for PICC line and home IV abx   -monitor temps  -f/u CBC  -supportive care  2. Other issues:   -care per medicine      
62 y/o Female with h/o nonhealing foot ulcer was admitted on 10/31 for infection of right first toe w/ malodorous drainage occurring over past several weeks. She's been sent in by her podiatrist for IV abx for osteomyelitis. Pt denies pain, fever/chills. Pt states this started after getting a pedicure. She has not been to any doctor for many years. In ED, she received zosyn, vanco IV. Overnight she is reported with bacterermia.    1. Bacteremia / Sepsis with strep spp. Right great toe acute OM with strep anginosus. Right foot cellulitis. RLE cellulitis. Probable undiagnosed DM type 2.  -leukocytosis  -cultures reviewed  -on vancomycin 1 gm IV q12h and ceftriaxone 2 gm IV qd # 2  -tolerating abx well so far; no side effects noted  -vancomycin trough level is therapeutic  -local foot care  -podiatry evaluation appreciated   -repeat BC x 2 collected  -cardiac Echo noted  -f/u cultures   -continue abx coverage   -monitor temps  -f/u CBC  -supportive care  2. Other issues:   -care per medicine      
64 y/o Female with h/o nonhealing foot ulcer was admitted on 10/31 for infection of right first toe w/ malodorous drainage occurring over past several weeks. She's been sent in by her podiatrist for IV abx for osteomyelitis. Pt denies pain, fever/chills. Pt states this started after getting a pedicure. She has not been to any doctor for many years. In ED, she received zosyn, vanco IV. Overnight she is reported with bacterermia.    1. Bacteremia / Sepsis with strep oralis. Right great toe acute OM with strep anginosus. Right foot cellulitis. RLE cellulitis. Probable undiagnosed DM type 2.  -leukocytosis  -cultures reviewed  -on vancomycin 1 gm IV q12h and ceftriaxone 2 gm IV qd # 3  -tolerating abx well so far; no side effects noted  -vancomycin trough level is therapeutic  -local foot care  -podiatry evaluation appreciated   -repeat BC x 2 collected  -d/c vancomycin  -cardiac Echo noted  -f/u cultures   -continue abx coverage with ceftriaxone  -plan for PICC line and home IV abx   -monitor temps  -f/u CBC  -supportive care  2. Other issues:   -care per medicine

## 2023-11-09 NOTE — DISCHARGE NOTE PROVIDER - CARE PROVIDER_API CALL
Hal Hernandez.  Podiatric Medicine and Surgery  [address] [place] [id number]  Phone: [phone]  Fax: [phone]  Follow Up Time:

## 2023-11-09 NOTE — DISCHARGE NOTE PROVIDER - NSDCMRMEDTOKEN_GEN_ALL_CORE_FT
atorvastatin 10 mg oral tablet: 1 tab(s) orally once a day (at bedtime)  cefTRIAXone: 2 gram(s) intravenous once a day 6 weeks  insulin glargine 100 units/mL subcutaneous solution: 16 unit(s) subcutaneous once a day (at bedtime)  insulin lispro 100 units/mL injectable solution: 2 unit(s) injectable 3 times a day 2U for -250, 4U for 251-300, 6U for 301-350, 8U for 351-400  Multiple Vitamins oral tablet: 1 tab(s) orally once a day

## 2023-11-09 NOTE — DISCHARGE NOTE PROVIDER - NSDCCPCAREPLAN_GEN_ALL_CORE_FT
PRINCIPAL DISCHARGE DIAGNOSIS  Diagnosis: Osteomyelitis of great toe of right foot  Assessment and Plan of Treatment: Continue IV antibiotic as ordered. Follow up with Dr Hernandez

## 2023-11-13 ENCOUNTER — TRANSCRIPTION ENCOUNTER (OUTPATIENT)
Age: 63
End: 2023-11-13

## 2023-11-17 DIAGNOSIS — Z91.199 PATIENT'S NONCOMPLIANCE WITH OTHER MEDICAL TREATMENT AND REGIMEN DUE TO UNSPECIFIED REASON: ICD-10-CM

## 2023-11-17 DIAGNOSIS — E11.52 TYPE 2 DIABETES MELLITUS WITH DIABETIC PERIPHERAL ANGIOPATHY WITH GANGRENE: ICD-10-CM
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
## 2023-11-17 DIAGNOSIS — E44.0 MODERATE PROTEIN-CALORIE MALNUTRITION: ICD-10-CM

## 2023-11-17 DIAGNOSIS — E11.69 TYPE 2 DIABETES MELLITUS WITH OTHER SPECIFIED COMPLICATION: ICD-10-CM
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
## 2023-11-17 DIAGNOSIS — E11.621 TYPE 2 DIABETES MELLITUS WITH FOOT ULCER: ICD-10-CM

## 2023-11-17 DIAGNOSIS — L03.115 CELLULITIS OF RIGHT LOWER LIMB: ICD-10-CM

## 2023-11-17 DIAGNOSIS — E11.65 TYPE 2 DIABETES MELLITUS WITH HYPERGLYCEMIA: ICD-10-CM

## 2023-11-17 DIAGNOSIS — E87.6 HYPOKALEMIA: ICD-10-CM

## 2023-11-17 DIAGNOSIS — M79.674 PAIN IN RIGHT TOE(S): ICD-10-CM

## 2023-11-17 DIAGNOSIS — M86.171 OTHER ACUTE OSTEOMYELITIS, RIGHT ANKLE AND FOOT: ICD-10-CM

## 2023-11-17 DIAGNOSIS — I87.2 VENOUS INSUFFICIENCY (CHRONIC) (PERIPHERAL): ICD-10-CM

## 2023-11-17 DIAGNOSIS — E87.1 HYPO-OSMOLALITY AND HYPONATREMIA: ICD-10-CM

## 2023-11-17 DIAGNOSIS — M89.771 MAJOR OSSEOUS DEFECT, RIGHT ANKLE AND FOOT: ICD-10-CM

## 2023-11-17 DIAGNOSIS — I10 ESSENTIAL (PRIMARY) HYPERTENSION: ICD-10-CM

## 2023-11-17 DIAGNOSIS — A40.8 OTHER STREPTOCOCCAL SEPSIS: ICD-10-CM

## 2023-11-17 DIAGNOSIS — L97.514 NON-PRESSURE CHRONIC ULCER OF OTHER PART OF RIGHT FOOT WITH NECROSIS OF BONE: ICD-10-CM

## 2023-11-22 ENCOUNTER — TRANSCRIPTION ENCOUNTER (OUTPATIENT)
Age: 63
End: 2023-11-22

## 2023-11-30 ENCOUNTER — TRANSCRIPTION ENCOUNTER (OUTPATIENT)
Age: 63
End: 2023-11-30

## 2023-12-02 LAB
CULTURE RESULTS: SIGNIFICANT CHANGE UP
SPECIMEN SOURCE: SIGNIFICANT CHANGE UP

## 2023-12-04 NOTE — PATIENT PROFILE ADULT - SURGICAL SITE INCISION
Problem: Activity Intolerance  Goal: # Functional status is maintained or returned to baseline  Outcome: Outcome Met, Continue evaluating goal progress toward completion     Problem: At Risk for Falls  Goal: # Patient does not fall  Outcome: Outcome Met, Continue evaluating goal progress toward completion      no

## 2023-12-07 ENCOUNTER — TRANSCRIPTION ENCOUNTER (OUTPATIENT)
Age: 63
End: 2023-12-07

## 2023-12-20 LAB
CULTURE RESULTS: SIGNIFICANT CHANGE UP
CULTURE RESULTS: SIGNIFICANT CHANGE UP
SPECIMEN SOURCE: SIGNIFICANT CHANGE UP
SPECIMEN SOURCE: SIGNIFICANT CHANGE UP

## 2024-01-05 ENCOUNTER — APPOINTMENT (OUTPATIENT)
Dept: INTERNAL MEDICINE | Facility: CLINIC | Age: 64
End: 2024-01-05

## 2024-01-22 ENCOUNTER — EMERGENCY (EMERGENCY)
Facility: HOSPITAL | Age: 64
LOS: 1 days | Discharge: HOME | End: 2024-01-22
Attending: STUDENT IN AN ORGANIZED HEALTH CARE EDUCATION/TRAINING PROGRAM
Payer: COMMERCIAL

## 2024-01-22 VITALS
DIASTOLIC BLOOD PRESSURE: 87 MMHG | OXYGEN SATURATION: 98 % | SYSTOLIC BLOOD PRESSURE: 139 MMHG | HEIGHT: 72 IN | RESPIRATION RATE: 15 BRPM | TEMPERATURE: 98 F | WEIGHT: 179.9 LBS | HEART RATE: 72 BPM

## 2024-01-22 PROCEDURE — 99283 EMERGENCY DEPT VISIT LOW MDM: CPT | Mod: 25

## 2024-01-22 PROCEDURE — 12011 RPR F/E/E/N/L/M 2.5 CM/<: CPT

## 2024-01-22 NOTE — ED STATDOCS - NSFOLLOWUPINSTRUCTIONS_ED_ALL_ED_FT
Head Injury    WHAT YOU NEED TO KNOW:    What do I need to know about a head injury? A head injury can include your scalp, face, skull, or brain and range from mild to severe. Effects can appear immediately after the injury or develop later. The effects may last a short time or be permanent. Healthcare providers may want to check your recovery over time. Treatment may change as you recover or develop new health problems from the head injury.    What are the signs and symptoms of a head injury?    An open scalp or skin wound, swelling, or bruising    Mild to moderate headache    Dizziness or loss of balance    Nausea or vomiting    Ringing in the ears or neck pain    Confusion, especially right after the injury    Change in mood, such as feeling restless or irritable    Trouble thinking, remembering, or concentrating    Drowsiness or decreased amount of energy    Trouble sleeping  How is a head injury diagnosed?    Tell your healthcare provider about your injury and symptoms. The provider will do an exam to check your brain function. He or she will check how your pupils react to light. He or she will check your memory, hand grasp, and balance.    You may need x-rays, a CT scan, or an MRI to check for bleeding or major damage to your skull or brain. You may be given contrast liquid to help the pictures show up better. Tell the healthcare provider if you have ever had an allergic reaction to contrast liquid. Do not enter the MRI room with anything metal. Metal can cause serious injury. Tell the provider if you have any metal in or on your body.  How is a head injury treated? A mild head injury may not need to be treated. You may be given medicine to decrease pain. Other treatments may depend on how severe your head injury is. A concussion, hematoma (collection of blood), or traumatic brain injury may need both immediate and long-term treatment.    How can I manage my symptoms?    Rest or do quiet activities. Limit your time watching TV, using the computer, or doing tasks that require a lot of thinking. Slowly return to your normal activities as directed. Do not play sports or do activities that may cause you to get hit in the head. Ask your healthcare provider when you can return to sports.    Apply ice on your head for 15 to 20 minutes every hour or as directed. Use an ice pack, or put crushed ice in a plastic bag. Cover it with a towel before you apply it to your skin. Ice helps prevent tissue damage and decreases swelling and pain.    Have someone stay with you for 24 hours , or as directed. This person can monitor you for problems and call for help if needed. When you are awake, the person should ask you a few questions every few hours to see if you are thinking clearly. An example is to ask your name or address.  What can I do to prevent another head injury?    Wear a helmet that fits properly. Do this when you play sports, or ride a bike, scooter, or skateboard. Helmets help decrease your risk for a serious head injury. Talk to your healthcare provider about other ways you can protect yourself if you play sports.    Wear your seatbelt every time you are in a car. This helps lower your risk for a head injury if you are in a car accident.  Call your local emergency number (911 in the ), or have someone else call if:    You cannot be woken.    You have a seizure.    You stop responding to others or you faint.    You have blurry or double vision.    Your speech becomes slurred or confused.    You have arm or leg weakness, loss of feeling, or new problems with coordination.    Your pupils are larger than usual, or one pupil is a different size than the other.    You have blood or clear fluid coming out of your ears or nose.  When should I seek immediate care?    You have repeated or forceful vomiting.    You feel confused.    Your headache gets worse or becomes severe.    You or someone caring for you notices that you are harder to wake than usual.  When should I call my doctor?    Your symptoms last longer than 6 weeks after the injury.    You have questions or concerns about your condition or care.  CARE AGREEMENT:    You have the right to help plan your care. Learn about your health condition and how it may be treated. Discuss treatment options with your healthcare providers to decide what care you want to receive. You always have the right to refuse treatment.        Facial Laceration  A facial laceration is a cut (laceration) on the face. It is caused by any injury that cuts or tears the skin or tissues on the face. Facial lacerations can bleed and be painful.    You may need medical attention to stop the bleeding, help the wound heal, lower your risk of infection, and prevent scarring. Lacerations usually heal quickly after treatment.    What are the causes?  This condition may be caused by:  A motor vehicle crash.  A sports injury.  A violent attack.  A fall.  What are the signs or symptoms?  Common symptoms of this condition include:  An obvious cut on the face.  Bleeding.  Pain.  Swelling.  Bruising.  A change in the appearance of the face.  How is this diagnosed?  Your health care provider can diagnose a facial laceration by doing a physical exam and asking how the injury happened. Your health care provider may also check for areas of bleeding, tissue damage, nerve injury, and objects (foreign bodies) in your wound.    How is this treated?  Treatment for a facial laceration depends on how severe and deep the wound is. It also depends on the risk for infection. First, your health care provider will clean the wound to prevent infection. Then, your health care provider will decide whether to close the wound. This depends on how deep the laceration is and how long ago your injury happened. If there is an increased risk of infection, the wound will not be closed.  If your wound needs to be closed:  Your health care provider will use stitches (sutures), skin glue (skin adhesive), or skin adhesive strips to repair the laceration.  Your health care provider may numb the area around your wound by injecting a numbing medicine in and around your laceration before doing the sutures.  Torn skin edges or dead skin may be removed.  If sutures are used, the laceration may be closed in layers. Absorbable sutures will be used for deep tissues and muscle. Removable sutures will be used to close the skin.  You may be given:  Pain medicine.  A tetanus shot.  Oral antibiotic medicines.  Antibiotic ointment.  Follow these instructions at home:  Wound care    Follow instructions from your health care provider about how to take care of your wound. Make sure you:  Wash your hands with soap and water for at least 20 seconds before and after you change your bandage (dressing). If soap and water are not available, use hand .  Change your dressing as told by your health care provider.  Leave sutures, skin adhesive, or adhesive strips in place. These skin closures may need to stay in place for 2 weeks or longer. If adhesive strip edges start to loosen and curl up, you may trim the loose edges. Do not remove adhesive strips completely unless your health care provider tells you to do that.  These instructions will vary depending on how the wound was closed.    For sutures:      Keep the wound clean and dry.  If you were given a dressing, change it at least once a day, or as told by your health care provider. Also change the dressing if it gets wet or dirty.  Wash the wound with soap and water two times a day, or as told by your health care provider. Rinse off the soap with water. Pat the wound dry with a clean towel.  After cleaning, apply a thin layer of antibiotic ointment as told by your health care provider. This helps prevent infection and keeps the dressing from sticking to the wound.  You may shower as usual after the first 24 hours. Do not soak the wound until the sutures are removed.  Return to have your sutures removed as told by your health care provider.  Do not wear makeup in the area of the wound until your health care provider has approved.  For skin adhesive:      You may briefly wet your wound in the shower or bath.  Do not soak or scrub the wound.  Do not swim.  Do not sweat heavily until the skin adhesive has fallen off on its own.  After showering or bathing, gently pat the wound dry with a clean towel.  Do not apply liquid medicine, cream medicine, ointment, or makeup to your wound while the skin adhesive is in place. This may loosen the film before your wound is healed.  If you have a dressing over your wound, be careful not to apply tape directly over the skin adhesive. This may pull off the adhesive before the wound is healed.  Do not spend a long time in the sun or use a tanning lamp while the skin adhesive is in place.  The skin adhesive will usually remain in place for 5–10 days and then naturally fall off the skin. Do not pick at the adhesive film.  For skin adhesive strips:      Keep the wound clean and dry.  Do not let the skin adhesive strips get wet.  Bathe carefully to keep the wound and adhesive strips dry. If the wound gets wet, pat it dry with a clean towel right away.  Skin adhesive strips fall off on their own over time. You may trim the strips as the wound heals. Do not remove skin adhesive strips that are still stuck to the wound.  General instructions    Check your wound area every day for signs of infection. Check for:  More redness, swelling, or pain.  Fluid or blood.  Warmth.  Pus or a bad smell.  Take over-the-counter and prescription medicines only as told by your health care provider.  If you were prescribed an antibiotic medicine, take it or apply it as told by your health care provider. Do not stop using the antibiotic even if you start to feel better.  After the laceration has healed:  Know that it can take a year or two for redness or scarring to fade.  Apply sunscreen to the skin of your healed wound to minimize scarring. Ultraviolet (UV) rays can darken scar tissue.  Contact a health care provider if:  You have a fever. A fever is a body temperature that is 100.4°F (38°C) or higher.  You have more redness, swelling, or pain around your wound.  You have fluid or blood coming from your wound.  Your wound feels warm to the touch.  You have pus or a bad smell coming from your wound.  Get help right away if:  You have a red streak going away from your wound.  Summary  You may need treatment for a facial laceration to prevent infection, stop bleeding, help healing, and prevent scarring.  A deep laceration may be closed with stitches (sutures).  Follow your health care provider's wound care instructions carefully.  This information is not intended to replace advice given to you by your health care provider. Make sure you discuss any questions you have with your health care provider.    Tissue Adhesive Wound Care  Some cuts and wounds can be closed with skin glue (tissue adhesive). Skin glue holds the skin together and helps your wound heal faster. Skin glue goes away on its own as your wound gets better. It is important to take good care of your wound while it heals.    Follow these instructions at home:  Wound care    Washing hands with soap and water.  Two wounds closed with skin glue. One is normal. The other is red with pus and infected.  If a bandage (dressing) was put on the wound, keep it clean and dry.  Follow instructions from your doctor about how often to change the bandage. Make sure you:  Wash your hands with soap and water for at least 20 seconds before and after you change your bandage. If you cannot use soap and water, use hand .  Change the bandage as often as told by your doctor.  Leave skin glue in place. It will fall off on its own after 7–10 days.  Do not scratch, rub, or pick at the skin glue.  Do not put tape over the skin glue. The skin glue could come off when you take the tape off.  Check your wound daily to make sure it is not starting to reopen.  Protect the wound from another injury.  Check your wound area every day for signs of infection. Check for:  More redness, swelling, or pain.  Fluid or blood.  Warmth or a rash around the wound.  Pus or a bad smell.  Bathing    Do not take baths, swim, or use a hot tub. Ask your doctor about taking showers or sponge baths.  You can usually shower after the first 24 hours.  Cover the bandage with a watertight covering when you take a shower.  Do not soak the area where skin glue has been used.  Do not use soaps or put creams on your wound.  Eating and drinking    Eat healthy foods to help the wound heal. As told by your doctor, eat:  Foods rich in protein. These include meat, fish, eggs, dairy, beans, and nuts.  Foods rich in vitamin A. These include carrots and dark green, leafy vegetables.  Foods rich in vitamin C. These include oranges, tomatoes, broccoli, and peppers.  Drink enough fluid to keep your pee (urine) pale yellow.  General instructions    Protect your wound from the sun when you are outside for the first 6 months, or for as long as told by your doctor. Put on sunscreen with an SPF of 30 or higher around the scar, or cover it up.  Take over-the-counter and prescription medicines only as told by your doctor.  Do not smoke or use any products that contain nicotine or tobacco. These can delay wound healing. If you need help quitting, ask your doctor.  Keep all follow-up visits.  Contact a doctor if:  The glue used on your wound gets soaked with blood or falls off before your wound has healed. The glue may need to be replaced.  You have a fever or chills.  You have redness, swelling, or pain around your wound.  You have fluid or blood coming from your wound.  You get a rash after the glue is put on.  Get help right away if:  Your wound breaks open.  You have a red streak at the area around your wound.  You have pus or a bad smell coming from your wound.  Summary  Some cuts and wounds can be closed with skin glue (tissue adhesive). Skin glue holds the skin together and helps your wound heal faster.  It is important to take good care of your wound while it heals.  Wash your hands with soap and water for at least 20 seconds before and after you change your bandage.  Eat healthy foods.  Check your wound every day for signs of infection.  This information is not intended to replace advice given to you by your health care provider. Make sure you discuss any questions you have with your health care provider.    Document Revised: 04/25/2022 Document Reviewed: 04/25/2022

## 2024-01-22 NOTE — ED STATDOCS - CLINICAL SUMMARY MEDICAL DECISION MAKING FREE TEXT BOX
Pt presents s/p mechanical fall with head strike. Will Dermabond laceration. CT head not indicated. Pt presents s/p mechanical fall with head strike. Will Dermabond laceration. CT head not indicated.          Laceration repaired with dermal glue and steri strips.  DC with return instructions.  Sangita Maya PA-C

## 2024-01-22 NOTE — ED STATDOCS - OBJECTIVE STATEMENT
64 y/o female employee at  presents to the ED s/p trip and fall. Pt hit her head on the floor. No LOC. Not on anticoagulation. Pt c/o mild HA. +laceration. Denies neck pain, vision changes, CP, SOB, abd pain, n/v. No other complaints at this time.

## 2024-01-22 NOTE — ED STATDOCS - PATIENT PORTAL LINK FT
You can access the FollowMyHealth Patient Portal offered by [facility] by registering at the following website: [email or web address]. By joining [employer]’s FollowMyHealth portal, you will also be able to view your health information using other applications (apps) compatible with our system.

## 2024-01-22 NOTE — ED ADULT TRIAGE NOTE - CHIEF COMPLAINT QUOTE
patient s/p trip and fall at work.  hit head on floor.  laceration noted above left eyebrow.  tetanus UTD.  no anticoagulation use.  no LOC.

## 2024-01-22 NOTE — ED STATDOCS - PROGRESS NOTE DETAILS
Laceration repaired with dermal glue and steri strips.  DC with return instructions.  Sangita Maya PA-C Pt. is a 63 year old female presents with trip and fall at work.  Pt. tripped over a cord and hit her face on the ground.  Noted 1.5 cm superficial laceration above left eyebrow.  Neg. LOC.  Pt. c/o headache.  Neg. heck pain, vision changes, N/V, other injuries.  Sangita Maya PA-C

## 2024-01-30 DIAGNOSIS — S01.112A LACERATION WITHOUT FOREIGN BODY OF LEFT EYELID AND PERIOCULAR AREA, INITIAL ENCOUNTER: ICD-10-CM

## 2024-01-30 DIAGNOSIS — R51.9 HEADACHE, UNSPECIFIED: ICD-10-CM
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
## 2024-02-05 ENCOUNTER — NON-APPOINTMENT (OUTPATIENT)
Age: 64
End: 2024-02-05

## 2024-02-12 ENCOUNTER — APPOINTMENT (OUTPATIENT)
Dept: INTERNAL MEDICINE | Facility: CLINIC | Age: 64
End: 2024-02-12
Payer: COMMERCIAL

## 2024-02-12 VITALS
RESPIRATION RATE: 16 BRPM | DIASTOLIC BLOOD PRESSURE: 66 MMHG | OXYGEN SATURATION: 95 % | HEART RATE: 73 BPM | BODY MASS INDEX: 22.62 KG/M2 | HEIGHT: 72 IN | TEMPERATURE: 97.8 F | SYSTOLIC BLOOD PRESSURE: 106 MMHG | WEIGHT: 167 LBS

## 2024-02-12 DIAGNOSIS — Z13.31 ENCOUNTER FOR SCREENING FOR DEPRESSION: ICD-10-CM

## 2024-02-12 DIAGNOSIS — Z12.39 ENCOUNTER FOR OTHER SCREENING FOR MALIGNANT NEOPLASM OF BREAST: ICD-10-CM

## 2024-02-12 DIAGNOSIS — Z00.00 ENCOUNTER FOR GENERAL ADULT MEDICAL EXAMINATION W/OUT ABNORMAL FINDINGS: ICD-10-CM

## 2024-02-12 DIAGNOSIS — Z13.820 ENCOUNTER FOR SCREENING FOR OSTEOPOROSIS: ICD-10-CM

## 2024-02-12 DIAGNOSIS — R74.8 ABNORMAL LEVELS OF OTHER SERUM ENZYMES: ICD-10-CM

## 2024-02-12 PROBLEM — Z78.9 OTHER SPECIFIED HEALTH STATUS: Chronic | Status: ACTIVE | Noted: 2024-01-26

## 2024-02-12 PROCEDURE — 99387 INIT PM E/M NEW PAT 65+ YRS: CPT

## 2024-02-12 PROCEDURE — 93000 ELECTROCARDIOGRAM COMPLETE: CPT

## 2024-02-12 PROCEDURE — 96127 BRIEF EMOTIONAL/BEHAV ASSMT: CPT

## 2024-02-13 PROBLEM — Z13.31 DEPRESSION SCREENING: Status: ACTIVE | Noted: 2024-02-13

## 2024-02-13 RX ORDER — BENZONATATE 200 MG/1
200 CAPSULE ORAL
Qty: 21 | Refills: 0 | Status: ACTIVE | COMMUNITY
Start: 2024-02-06

## 2024-02-13 NOTE — ASSESSMENT
[FreeTextEntry1] : Annual: Ordered comprehensive blood work , screen for hyperlipidemia , diabetes and thyroid disorder.   Patient is not fasting prescription given. The results will be reviewed, and any abnormalities will be addressed accordingly. Ordered mammogram and bone density.  She will schedule appoint meant with GYN for well woman exam. Received   flu vaccine Deferred pneumonia and shingles vaccine. Declined for HIV and Hep C  screening test. alcohol screening :SIBRT:0 Depression screening:PHQ2:0 Patient sees gastroenterology at [place] she will call and schedule appointment for colonoscopy   EKG: NSR , no ST-T wave changes    type 2 diabetes:  ordered HbA1c, urine microalbumin.  She is currently on semglee 16 unit at bedtime.   home monitoring of blood sugar as recommended. symptoms of low blood sugar discussed. Adherence to ADA diet, portion control. exercise regularly, maintain a healthy weight. Educated on foot care( choose comfortable shocks and shoes, trim nails careful, wash and check your feet daily).  Hyperlipidemia : on atorvasattin 10 mg .  Ordered lipid profile. Advise low-fat diet and exercise.  Follow-up in 1 week.

## 2024-02-13 NOTE — HEALTH RISK ASSESSMENT
[Good] : ~his/her~ current health as good [Very Good] : ~his/her~  mood as very good [No] : No [Little interest or pleasure doing things] : 1) Little interest or pleasure doing things [Feeling down, depressed, or hopeless] : 2) Feeling down, depressed, or hopeless [0] : 2) Feeling down, depressed, or hopeless: Not at all (0) [PHQ-2 Negative - No further assessment needed] : PHQ-2 Negative - No further assessment needed [Patient declined colonoscopy] : Patient declined colonoscopy [HIV test declined] : HIV test declined [Hepatitis C test declined] : Hepatitis C test declined [Employed] : employed [Smoke Detector] : smoke detector [Seat Belt] :  uses seat belt [Patient/Caregiver not ready to engage] : , patient/caregiver not ready to engage [Never] : Never [Audit-CScore] : 0 [MEC1Xjmnv] : 0 [Reports changes in hearing] : Reports no changes in hearing [Reports changes in vision] : Reports no changes in vision [Reports changes in dental health] : Reports no changes in dental health [MammogramComments] : ordered today [PapSmearComments] : she will schedule  [BoneDensityComments] : ordered today [AdvancecareDate] : 2/24

## 2024-02-13 NOTE — HISTORY OF PRESENT ILLNESS
[FreeTextEntry1] : Establish care. Annual wellness exam. [de-identified] : Ms. JENNIFER PINA is a 63 year female Type 2 diabetes diagnosed in last admission December of last year when she had her right big toe amputation from cellulitis She is currently on semglee 16 unit at bedtime,  hyperlipidemia on atorvastatin 10 mg at bedtime.  Patient stated last 1 weeks she had been having cough and congestion she went to urgent care COVID test was negative.  She was prescribed cough medicine and discharged home her chest x-ray did not show any pneumonia.  She states she is feeling better still have intermittent cough.  Denied any fever no shortness of breath.

## 2024-02-20 ENCOUNTER — APPOINTMENT (OUTPATIENT)
Dept: INTERNAL MEDICINE | Facility: CLINIC | Age: 64
End: 2024-02-20

## 2024-03-12 LAB
ALBUMIN SERPL ELPH-MCNC: 4.2 G/DL
ALP BLD-CCNC: 102 U/L
ALT SERPL-CCNC: 26 U/L
ANION GAP SERPL CALC-SCNC: 11 MMOL/L
AST SERPL-CCNC: 22 U/L
BASOPHILS # BLD AUTO: 0.11 K/UL
BASOPHILS NFR BLD AUTO: 1.7 %
BILIRUB SERPL-MCNC: 0.6 MG/DL
BUN SERPL-MCNC: 16 MG/DL
CALCIUM SERPL-MCNC: 9.6 MG/DL
CHLORIDE SERPL-SCNC: 101 MMOL/L
CHOLEST SERPL-MCNC: 210 MG/DL
CO2 SERPL-SCNC: 26 MMOL/L
CREAT SERPL-MCNC: 0.71 MG/DL
EGFR: 94 ML/MIN/1.73M2
EOSINOPHIL # BLD AUTO: 0.18 K/UL
EOSINOPHIL NFR BLD AUTO: 2.8 %
ESTIMATED AVERAGE GLUCOSE: 223 MG/DL
GLUCOSE SERPL-MCNC: 180 MG/DL
HAV IGM SER QL: NONREACTIVE
HBA1C MFR BLD HPLC: 9.4 %
HBV CORE IGM SER QL: NONREACTIVE
HBV SURFACE AG SER QL: NONREACTIVE
HCT VFR BLD CALC: 42.4 %
HCV AB SER QL: NONREACTIVE
HCV S/CO RATIO: 0.14 S/CO
HDLC SERPL-MCNC: 69 MG/DL
HGB BLD-MCNC: 13.6 G/DL
IMM GRANULOCYTES NFR BLD AUTO: 0.2 %
LDLC SERPL CALC-MCNC: 127 MG/DL
LYMPHOCYTES # BLD AUTO: 2.13 K/UL
LYMPHOCYTES NFR BLD AUTO: 32.6 %
MAN DIFF?: NORMAL
MCHC RBC-ENTMCNC: 28.6 PG
MCHC RBC-ENTMCNC: 32.1 GM/DL
MCV RBC AUTO: 89.1 FL
MONOCYTES # BLD AUTO: 0.53 K/UL
MONOCYTES NFR BLD AUTO: 8.1 %
NEUTROPHILS # BLD AUTO: 3.57 K/UL
NEUTROPHILS NFR BLD AUTO: 54.6 %
NONHDLC SERPL-MCNC: 141 MG/DL
PLATELET # BLD AUTO: 223 K/UL
POTASSIUM SERPL-SCNC: 4.1 MMOL/L
PROT SERPL-MCNC: 6.6 G/DL
RBC # BLD: 4.76 M/UL
RBC # FLD: 12.8 %
SODIUM SERPL-SCNC: 138 MMOL/L
TRIGL SERPL-MCNC: 80 MG/DL
TSH SERPL-ACNC: 0.39 UIU/ML
WBC # FLD AUTO: 6.53 K/UL

## 2024-03-13 ENCOUNTER — INPATIENT (INPATIENT)
Facility: HOSPITAL | Age: 64
LOS: 1 days | Discharge: HOME | DRG: 637 | End: 2024-03-15
Attending: HOSPITALIST | Admitting: HOSPITALIST
Payer: COMMERCIAL

## 2024-03-13 VITALS
RESPIRATION RATE: 16 BRPM | SYSTOLIC BLOOD PRESSURE: 146 MMHG | HEIGHT: 72 IN | HEART RATE: 57 BPM | OXYGEN SATURATION: 99 % | DIASTOLIC BLOOD PRESSURE: 68 MMHG | TEMPERATURE: 98 F | WEIGHT: 149.91 LBS

## 2024-03-13 DIAGNOSIS — M86.10 OTHER ACUTE OSTEOMYELITIS, UNSPECIFIED SITE: ICD-10-CM

## 2024-03-13 LAB
ALBUMIN SERPL ELPH-MCNC: 3.2 G/DL — LOW (ref 3.3–5)
ALP SERPL-CCNC: 103 U/L — SIGNIFICANT CHANGE UP (ref 40–120)
ALT FLD-CCNC: 30 U/L — SIGNIFICANT CHANGE UP (ref 12–78)
ANION GAP SERPL CALC-SCNC: 3 MMOL/L — LOW (ref 5–17)
APTT BLD: 30.4 SEC — SIGNIFICANT CHANGE UP (ref 24.5–35.6)
AST SERPL-CCNC: 16 U/L — SIGNIFICANT CHANGE UP (ref 15–37)
BASOPHILS # BLD AUTO: 0.1 K/UL — SIGNIFICANT CHANGE UP (ref 0–0.2)
BASOPHILS NFR BLD AUTO: 1.6 % — SIGNIFICANT CHANGE UP (ref 0–2)
BILIRUB SERPL-MCNC: 0.6 MG/DL — SIGNIFICANT CHANGE UP (ref 0.2–1.2)
BUN SERPL-MCNC: 18 MG/DL — SIGNIFICANT CHANGE UP (ref 7–23)
CALCIUM SERPL-MCNC: 9.5 MG/DL — SIGNIFICANT CHANGE UP (ref 8.5–10.1)
CHLORIDE SERPL-SCNC: 106 MMOL/L — SIGNIFICANT CHANGE UP (ref 96–108)
CO2 SERPL-SCNC: 32 MMOL/L — HIGH (ref 22–31)
CREAT SERPL-MCNC: 0.78 MG/DL — SIGNIFICANT CHANGE UP (ref 0.5–1.3)
CRP SERPL-MCNC: <3 MG/L — SIGNIFICANT CHANGE UP
EGFR: 84 ML/MIN/1.73M2 — SIGNIFICANT CHANGE UP
EOSINOPHIL # BLD AUTO: 0.15 K/UL — SIGNIFICANT CHANGE UP (ref 0–0.5)
EOSINOPHIL NFR BLD AUTO: 2.4 % — SIGNIFICANT CHANGE UP (ref 0–6)
ERYTHROCYTE [SEDIMENTATION RATE] IN BLOOD: 11 MM/HR — SIGNIFICANT CHANGE UP (ref 0–20)
GLUCOSE BLDC GLUCOMTR-MCNC: 187 MG/DL — HIGH (ref 70–99)
GLUCOSE BLDC GLUCOMTR-MCNC: 195 MG/DL — HIGH (ref 70–99)
GLUCOSE SERPL-MCNC: 206 MG/DL — HIGH (ref 70–99)
HCT VFR BLD CALC: 40.9 % — SIGNIFICANT CHANGE UP (ref 34.5–45)
HGB BLD-MCNC: 13.5 G/DL — SIGNIFICANT CHANGE UP (ref 11.5–15.5)
IMM GRANULOCYTES NFR BLD AUTO: 0.3 % — SIGNIFICANT CHANGE UP (ref 0–0.9)
INR BLD: 0.93 RATIO — SIGNIFICANT CHANGE UP (ref 0.85–1.18)
LACTATE SERPL-SCNC: 0.6 MMOL/L — LOW (ref 0.7–2)
LYMPHOCYTES # BLD AUTO: 2.29 K/UL — SIGNIFICANT CHANGE UP (ref 1–3.3)
LYMPHOCYTES # BLD AUTO: 36.1 % — SIGNIFICANT CHANGE UP (ref 13–44)
MCHC RBC-ENTMCNC: 28.8 PG — SIGNIFICANT CHANGE UP (ref 27–34)
MCHC RBC-ENTMCNC: 33 GM/DL — SIGNIFICANT CHANGE UP (ref 32–36)
MCV RBC AUTO: 87.2 FL — SIGNIFICANT CHANGE UP (ref 80–100)
MONOCYTES # BLD AUTO: 0.44 K/UL — SIGNIFICANT CHANGE UP (ref 0–0.9)
MONOCYTES NFR BLD AUTO: 6.9 % — SIGNIFICANT CHANGE UP (ref 2–14)
NEUTROPHILS # BLD AUTO: 3.35 K/UL — SIGNIFICANT CHANGE UP (ref 1.8–7.4)
NEUTROPHILS NFR BLD AUTO: 52.7 % — SIGNIFICANT CHANGE UP (ref 43–77)
PLATELET # BLD AUTO: 195 K/UL — SIGNIFICANT CHANGE UP (ref 150–400)
POTASSIUM SERPL-MCNC: 3.8 MMOL/L — SIGNIFICANT CHANGE UP (ref 3.5–5.3)
POTASSIUM SERPL-SCNC: 3.8 MMOL/L — SIGNIFICANT CHANGE UP (ref 3.5–5.3)
PROT SERPL-MCNC: 6.9 GM/DL — SIGNIFICANT CHANGE UP (ref 6–8.3)
PROTHROM AB SERPL-ACNC: 10.5 SEC — SIGNIFICANT CHANGE UP (ref 9.5–13)
RBC # BLD: 4.69 M/UL — SIGNIFICANT CHANGE UP (ref 3.8–5.2)
RBC # FLD: 12.8 % — SIGNIFICANT CHANGE UP (ref 10.3–14.5)
SODIUM SERPL-SCNC: 141 MMOL/L — SIGNIFICANT CHANGE UP (ref 135–145)
URATE SERPL-MCNC: 4.3 MG/DL — SIGNIFICANT CHANGE UP (ref 2.5–7)
WBC # BLD: 6.35 K/UL — SIGNIFICANT CHANGE UP (ref 3.8–10.5)
WBC # FLD AUTO: 6.35 K/UL — SIGNIFICANT CHANGE UP (ref 3.8–10.5)

## 2024-03-13 PROCEDURE — 99223 1ST HOSP IP/OBS HIGH 75: CPT

## 2024-03-13 PROCEDURE — 80048 BASIC METABOLIC PNL TOTAL CA: CPT

## 2024-03-13 PROCEDURE — 73720 MRI LWR EXTREMITY W/O&W/DYE: CPT | Mod: MC,RT

## 2024-03-13 PROCEDURE — 93010 ELECTROCARDIOGRAM REPORT: CPT

## 2024-03-13 PROCEDURE — C1751: CPT

## 2024-03-13 PROCEDURE — 73630 X-RAY EXAM OF FOOT: CPT | Mod: 26,RT

## 2024-03-13 PROCEDURE — 85027 COMPLETE CBC AUTOMATED: CPT

## 2024-03-13 PROCEDURE — 99285 EMERGENCY DEPT VISIT HI MDM: CPT

## 2024-03-13 PROCEDURE — 71045 X-RAY EXAM CHEST 1 VIEW: CPT | Mod: 26

## 2024-03-13 PROCEDURE — 82962 GLUCOSE BLOOD TEST: CPT

## 2024-03-13 PROCEDURE — 36415 COLL VENOUS BLD VENIPUNCTURE: CPT

## 2024-03-13 PROCEDURE — 99053 MED SERV 10PM-8AM 24 HR FAC: CPT

## 2024-03-13 PROCEDURE — A9579: CPT

## 2024-03-13 PROCEDURE — 36569 INSJ PICC 5 YR+ W/O IMAGING: CPT

## 2024-03-13 PROCEDURE — 93005 ELECTROCARDIOGRAM TRACING: CPT

## 2024-03-13 PROCEDURE — 71045 X-RAY EXAM CHEST 1 VIEW: CPT

## 2024-03-13 PROCEDURE — 83036 HEMOGLOBIN GLYCOSYLATED A1C: CPT

## 2024-03-13 PROCEDURE — 80202 ASSAY OF VANCOMYCIN: CPT

## 2024-03-13 RX ORDER — GLUCAGON INJECTION, SOLUTION 0.5 MG/.1ML
1 INJECTION, SOLUTION SUBCUTANEOUS ONCE
Refills: 0 | Status: DISCONTINUED | OUTPATIENT
Start: 2024-03-13 | End: 2024-03-15

## 2024-03-13 RX ORDER — SODIUM CHLORIDE 9 MG/ML
1000 INJECTION, SOLUTION INTRAVENOUS
Refills: 0 | Status: DISCONTINUED | OUTPATIENT
Start: 2024-03-13 | End: 2024-03-15

## 2024-03-13 RX ORDER — INSULIN GLARGINE 100 [IU]/ML
16 INJECTION, SOLUTION SUBCUTANEOUS AT BEDTIME
Refills: 0 | Status: DISCONTINUED | OUTPATIENT
Start: 2024-03-13 | End: 2024-03-15

## 2024-03-13 RX ORDER — DEXTROSE 50 % IN WATER 50 %
25 SYRINGE (ML) INTRAVENOUS ONCE
Refills: 0 | Status: DISCONTINUED | OUTPATIENT
Start: 2024-03-13 | End: 2024-03-15

## 2024-03-13 RX ORDER — VANCOMYCIN HCL 1 G
VIAL (EA) INTRAVENOUS
Refills: 0 | Status: DISCONTINUED | OUTPATIENT
Start: 2024-03-13 | End: 2024-03-13

## 2024-03-13 RX ORDER — CEFTRIAXONE 500 MG/1
INJECTION, POWDER, FOR SOLUTION INTRAMUSCULAR; INTRAVENOUS
Refills: 0 | Status: DISCONTINUED | OUTPATIENT
Start: 2024-03-13 | End: 2024-03-15

## 2024-03-13 RX ORDER — VANCOMYCIN HCL 1 G
1000 VIAL (EA) INTRAVENOUS EVERY 12 HOURS
Refills: 0 | Status: DISCONTINUED | OUTPATIENT
Start: 2024-03-13 | End: 2024-03-15

## 2024-03-13 RX ORDER — CEFTRIAXONE 500 MG/1
1000 INJECTION, POWDER, FOR SOLUTION INTRAMUSCULAR; INTRAVENOUS ONCE
Refills: 0 | Status: DISCONTINUED | OUTPATIENT
Start: 2024-03-13 | End: 2024-03-13

## 2024-03-13 RX ORDER — VANCOMYCIN HCL 1 G
1000 VIAL (EA) INTRAVENOUS ONCE
Refills: 0 | Status: COMPLETED | OUTPATIENT
Start: 2024-03-13 | End: 2024-03-13

## 2024-03-13 RX ORDER — ONDANSETRON 8 MG/1
4 TABLET, FILM COATED ORAL EVERY 8 HOURS
Refills: 0 | Status: DISCONTINUED | OUTPATIENT
Start: 2024-03-13 | End: 2024-03-15

## 2024-03-13 RX ORDER — CEFTRIAXONE 500 MG/1
2000 INJECTION, POWDER, FOR SOLUTION INTRAMUSCULAR; INTRAVENOUS ONCE
Refills: 0 | Status: COMPLETED | OUTPATIENT
Start: 2024-03-13 | End: 2024-03-13

## 2024-03-13 RX ORDER — DEXTROSE 50 % IN WATER 50 %
15 SYRINGE (ML) INTRAVENOUS ONCE
Refills: 0 | Status: DISCONTINUED | OUTPATIENT
Start: 2024-03-13 | End: 2024-03-15

## 2024-03-13 RX ORDER — CEFTRIAXONE 500 MG/1
1000 INJECTION, POWDER, FOR SOLUTION INTRAMUSCULAR; INTRAVENOUS ONCE
Refills: 0 | Status: COMPLETED | OUTPATIENT
Start: 2024-03-13 | End: 2024-03-13

## 2024-03-13 RX ORDER — LANOLIN ALCOHOL/MO/W.PET/CERES
3 CREAM (GRAM) TOPICAL AT BEDTIME
Refills: 0 | Status: DISCONTINUED | OUTPATIENT
Start: 2024-03-13 | End: 2024-03-15

## 2024-03-13 RX ORDER — DEXTROSE 50 % IN WATER 50 %
12.5 SYRINGE (ML) INTRAVENOUS ONCE
Refills: 0 | Status: DISCONTINUED | OUTPATIENT
Start: 2024-03-13 | End: 2024-03-15

## 2024-03-13 RX ORDER — CEFTRIAXONE 500 MG/1
2000 INJECTION, POWDER, FOR SOLUTION INTRAMUSCULAR; INTRAVENOUS EVERY 24 HOURS
Refills: 0 | Status: DISCONTINUED | OUTPATIENT
Start: 2024-03-14 | End: 2024-03-15

## 2024-03-13 RX ORDER — ACETAMINOPHEN 500 MG
650 TABLET ORAL EVERY 6 HOURS
Refills: 0 | Status: DISCONTINUED | OUTPATIENT
Start: 2024-03-13 | End: 2024-03-15

## 2024-03-13 RX ORDER — INSULIN LISPRO 100/ML
VIAL (ML) SUBCUTANEOUS
Refills: 0 | Status: DISCONTINUED | OUTPATIENT
Start: 2024-03-13 | End: 2024-03-15

## 2024-03-13 RX ADMIN — INSULIN GLARGINE 16 UNIT(S): 100 INJECTION, SOLUTION SUBCUTANEOUS at 23:53

## 2024-03-13 RX ADMIN — Medication 250 MILLIGRAM(S): at 08:06

## 2024-03-13 RX ADMIN — Medication 1 TABLET(S): at 09:47

## 2024-03-13 RX ADMIN — CEFTRIAXONE 2000 MILLIGRAM(S): 500 INJECTION, POWDER, FOR SOLUTION INTRAMUSCULAR; INTRAVENOUS at 09:47

## 2024-03-13 RX ADMIN — Medication 250 MILLIGRAM(S): at 23:53

## 2024-03-13 RX ADMIN — CEFTRIAXONE 1000 MILLIGRAM(S): 500 INJECTION, POWDER, FOR SOLUTION INTRAMUSCULAR; INTRAVENOUS at 08:07

## 2024-03-13 RX ADMIN — Medication 1: at 23:58

## 2024-03-13 NOTE — H&P ADULT - HISTORY OF PRESENT ILLNESS
63F DM2, hld, PAD h/o non healing ulcer of right toe s/p amp now presents for presents to the office yesterday with a red swollen draining Right 2nd toe concern for underlying OM in the same non healing ulcer. Was on doxyxylcine.  Denies fevers. Labs wnl. VSS.   Denies n/v/d, no cp/sob, no fevers. No HA. No abd pain. No rash.   MRI pending  ED course: vanco/ceftriaxone        Social History:   denies smoking/etoh/drug use  lives alone, ind ADLs    FAMILY HISTORY:  pt denies

## 2024-03-13 NOTE — ED ADULT NURSE NOTE - NSFALLHARMRISKINTERV_ED_ALL_ED

## 2024-03-13 NOTE — ED ADULT TRIAGE NOTE - BSA (M2)
Patient just got back from a trip and is dealing with some aches and pain from traveling. Patient is hoping to request a refill for Methotrexate (4 pack). Patient states usually his orthopedic prescribes the medication but he no longer sees him. Please send refill to [facility] [place] [address]. Patient saw Dr Mary Reynaga for an annual physical in May 2022. 1.88

## 2024-03-13 NOTE — ED PROVIDER NOTE - PHYSICAL EXAMINATION
Gen: Well appearing in NAD  Head: NC/AT  Neck: Trachea midline  Resp: No distress  Ext: Right second toe with distal ulceration and some erythema  Neuro: A&O appears non focal  Skin: Warm and dry as visualized  Psych: Normal affect and mood

## 2024-03-13 NOTE — H&P ADULT - NSHPLABSRESULTS_GEN_ALL_CORE
LABS: All Labs Reviewed:                        13.5   6.35  )-----------( 195      ( 13 Mar 2024 07:04 )             40.9     03-13    141  |  106  |  18  ----------------------------<  206<H>  3.8   |  32<H>  |  0.78    Ca    9.5      13 Mar 2024 07:04    TPro  6.9  /  Alb  3.2<L>  /  TBili  0.6  /  DBili  x   /  AST  16  /  ALT  30  /  AlkPhos  103  03-13    PT/INR - ( 13 Mar 2024 07:04 )   PT: 10.5 sec;   INR: 0.93 ratio         PTT - ( 13 Mar 2024 07:04 )  PTT:30.4 sec          Blood Culture:

## 2024-03-13 NOTE — ED ADULT NURSE NOTE - OBJECTIVE STATEMENT
Pt presents to ED sen in by podiatrist for concern for R second toe osteomyelitis. pmh of R great toe osteomyelitis w amputation of great toe. denies fever, chills, n/v labs obtained pt has no other concerns at this time

## 2024-03-13 NOTE — CONSULT NOTE ADULT - ASSESSMENT
64 y/o female h/o DM liam 2, HL, prior right great toe OM s/p amputation was admitted on 3/12 for a nonhealing 2nd toe ulcer. There is concern for second toe osteomyelitis. The patient denies fever or chills at home. Patient was sent in by Dr. Hernandez.    1. Right 2nd toe ulcer with probable underlying acute on chronic OM. Probable PVD.   -no clinical signs of sepsis  -obtain BC x 2  -start vancomycin 1 gm IV q12h and cefepime 2 gm IV q12h  -reason for abx use and side effects reviewed with patient; monitor BMP and vancomycin trough levels   -podiatry evaluation appreciated  -local wound care  -old chart reviewed to assess prior cultures  -monitor temps  -f/u CBC  -supportive care  2. Other issues:   -care per medicine    Clinical team may change from intravenous to oral antibiotics when the following criteria are met:   1. Patient is clinically improving/stable       a)	Improved signs and symptoms of infection from initial presentation       b)	Afebrile for 24 hours       c)	Leukocytosis trending towards normal range   2. Patient is tolerating oral intake   3. Initial/repeat blood cultures are negative     When above criteria met may change iv antibiotics to an appropriate oral agent or she may need longer course of IV abx therapy  Cannot advise changing to oral antibiotic therapy until culture sensitivity is available.     62 y/o female h/o DM liam 2, HL, prior right great toe OM s/p amputation was admitted on 3/12 for a nonhealing 2nd toe ulcer. There is concern for second toe osteomyelitis. The patient denies fever or chills at home. Patient was sent in by Dr. Hernandez.    1. Right 2nd toe ulcer with probable underlying acute on chronic OM and toe cellulitis. Probable PVD.   -no clinical signs of sepsis  -obtain BC x 2  -start vancomycin 1 gm IV q12h and ceftriaxone 2 gm IV qd  -reason for abx use and side effects reviewed with patient; monitor BMP and vancomycin trough levels   -podiatry evaluation appreciated  -local wound care  -old chart reviewed to assess prior cultures  -monitor temps  -f/u CBC  -supportive care  2. Other issues:   -care per medicine    Clinical team may change from intravenous to oral antibiotics when the following criteria are met:   1. Patient is clinically improving/stable       a)	Improved signs and symptoms of infection from initial presentation       b)	Afebrile for 24 hours       c)	Leukocytosis trending towards normal range   2. Patient is tolerating oral intake   3. Initial/repeat blood cultures are negative     When above criteria met may change iv antibiotics to an appropriate oral agent or she may need longer course of IV abx therapy  Cannot advise changing to oral antibiotic therapy until culture sensitivity is available.

## 2024-03-13 NOTE — H&P ADULT - NSHPPHYSICALEXAM_GEN_ALL_CORE
ICU Vital Signs Last 24 Hrs  T(C): 36.6 (13 Mar 2024 06:17), Max: 36.6 (13 Mar 2024 06:17)  T(F): 97.8 (13 Mar 2024 06:17), Max: 97.8 (13 Mar 2024 06:17)  HR: 57 (13 Mar 2024 06:17) (57 - 57)  BP: 146/68 (13 Mar 2024 06:17) (146/68 - 146/68)  BP(mean): 90 (13 Mar 2024 06:17) (90 - 90)  ABP: --  ABP(mean): --  RR: 16 (13 Mar 2024 06:17) (16 - 16)  SpO2: 99% (13 Mar 2024 06:17) (99% - 99%)    O2 Parameters below as of 13 Mar 2024 06:17  Patient On (Oxygen Delivery Method): room air            PHYSICAL EXAM:    Constitutional: NAD, awake and alert  HEENT: PERR, EOMI, Normal Hearing, MMM  Neck: Soft and supple, No LAD, No JVD  Respiratory: Breath sounds are clear bilaterally, No wheezing, rales or rhonchi  Cardiovascular: S1 and S2, regular rate and rhythm, no Murmurs, gallops or rubs  Gastrointestinal: Bowel Sounds present, soft, nontender, nondistended, no guarding, no rebound  Extremities: No peripheral edema  Vascular: 2+ peripheral pulses  Neurological: A/O x 3, no focal deficits  Musculoskeletal: 5/5 strength b/l upper and lower extremities. Right 2nd toe with oval shaped ulcer, not draining + erythema  Skin: No rashes

## 2024-03-13 NOTE — CONSULT NOTE ADULT - SUBJECTIVE AND OBJECTIVE BOX
Patient is a 63y old  Female who presents with a chief complaint of foot ulcer    HPI:  62 y/o female h/o DM liam 2, HL, prior right great toe OM s/p amputation was admitted on 3/12 for a nonhealing 2nd toe ulcer. There is concern for second toe osteomyelitis. The patient denies fever or chills at home. Patient was sent in by Dr. Hernandez.    PMH: as above  PSH: as above  Meds: per reconciliation sheet, noted below  MEDICATIONS  (STANDING):  cefTRIAXone Injectable. 1000 milliGRAM(s) IV Push Once  vancomycin  IVPB. 1000 milliGRAM(s) IV Intermittent once    MEDICATIONS  (PRN):    Allergies    No Known Allergies    Intolerances      Social: no smoking, no alcohol, no illegal drugs; no recent travel, no exposure to TB  FAMILY HISTORY:    no history of premature cardiovascular disease in first degree relatives    ROS: the patient denies fever, no chills, no HA, no seizures, no dizziness, no sore throat, no nasal congestion, no blurry vision, no CP, no palpitations, no SOB, no cough, no abdominal pain, no diarrhea, no N/V, no dysuria, no leg pain, no claudication, no rash, no joint aches, no rectal pain or bleeding, no night sweats, has right foot ulcer  All other systems reviewed and are negative    Vital Signs Last 24 Hrs  T(C): 36.6 (13 Mar 2024 06:17), Max: 36.6 (13 Mar 2024 06:17)  T(F): 97.8 (13 Mar 2024 06:17), Max: 97.8 (13 Mar 2024 06:17)  HR: 57 (13 Mar 2024 06:17) (57 - 57)  BP: 146/68 (13 Mar 2024 06:17) (146/68 - 146/68)  BP(mean): 90 (13 Mar 2024 06:17) (90 - 90)  RR: 16 (13 Mar 2024 06:17) (16 - 16)  SpO2: 99% (13 Mar 2024 06:17) (99% - 99%)    Parameters below as of 13 Mar 2024 06:17  Patient On (Oxygen Delivery Method): room air      Daily Height in cm: 182.88 (13 Mar 2024 06:17)    Daily     PE:    Constitutional:  No acute distress  HEENT: NC/AT, EOMI, PERRLA, conjunctivae clear; ears and nose atraumatic; pharynx benign  Neck: supple; thyroid not palpable  Back: no tenderness  Respiratory: respiratory effort normal; clear to auscultation  Cardiovascular: S1S2 regular, no murmurs  Abdomen: soft, not tender, not distended, positive BS; no liver or spleen organomegaly  Genitourinary: no suprapubic tenderness  Lymphatic: no LN palpable  Musculoskeletal: no muscle tenderness, no joint swelling or tenderness  Extremities: no pedal edema  Right 2nd toe ulcer  Neurological/ Psychiatric: AxOx3, judgement and insight normal; moving all extremities  Skin: no rashes; no palpable lesions    Labs: all available labs reviewed                        13.5   6.35  )-----------( 195      ( 13 Mar 2024 07:04 )             40.9     03-13    141  |  106  |  18  ----------------------------<  206<H>  3.8   |  32<H>  |  0.78    Ca    9.5      13 Mar 2024 07:04    TPro  6.9  /  Alb  3.2<L>  /  TBili  0.6  /  DBili  x   /  AST  16  /  ALT  30  /  AlkPhos  103  03-13     LIVER FUNCTIONS - ( 13 Mar 2024 07:04 )  Alb: 3.2 g/dL / Pro: 6.9 gm/dL / ALK PHOS: 103 U/L / ALT: 30 U/L / AST: 16 U/L / GGT: x           Radiology: all available radiological tests reviewed    < from: Xray Chest 1 View- PORTABLE-Urgent (Xray Chest 1 View- PORTABLE-Urgent .) (11.06.23 @ 12:32) >  Right arm PICC catheter with tip at the cavoatrial junction.  < end of copied text >    Advanced directives addressed: full resuscitation Patient is a 63y old  Female who presents with a chief complaint of foot ulcer    HPI:  64 y/o female h/o DM liam 2, HL, prior right great toe OM s/p amputation was admitted on 3/12 for a nonhealing 2nd toe ulcer. There is concern for second toe osteomyelitis. The patient denies fever or chills at home. Patient was sent in by Dr. Hernandez.    PMH: as above  PSH: as above  Meds: per reconciliation sheet, noted below  MEDICATIONS  (STANDING):  cefTRIAXone Injectable. 1000 milliGRAM(s) IV Push Once  vancomycin  IVPB. 1000 milliGRAM(s) IV Intermittent once    MEDICATIONS  (PRN):    Allergies    No Known Allergies    Intolerances      Social: no smoking, no alcohol, no illegal drugs; no recent travel, no exposure to TB  FAMILY HISTORY:    no history of premature cardiovascular disease in first degree relatives    ROS: the patient denies fever, no chills, no HA, no seizures, no dizziness, no sore throat, no nasal congestion, no blurry vision, no CP, no palpitations, no SOB, no cough, no abdominal pain, no diarrhea, no N/V, no dysuria, no leg pain, no claudication, no rash, no joint aches, no rectal pain or bleeding, no night sweats, has right foot ulcer  All other systems reviewed and are negative    Vital Signs Last 24 Hrs  T(C): 36.6 (13 Mar 2024 06:17), Max: 36.6 (13 Mar 2024 06:17)  T(F): 97.8 (13 Mar 2024 06:17), Max: 97.8 (13 Mar 2024 06:17)  HR: 57 (13 Mar 2024 06:17) (57 - 57)  BP: 146/68 (13 Mar 2024 06:17) (146/68 - 146/68)  BP(mean): 90 (13 Mar 2024 06:17) (90 - 90)  RR: 16 (13 Mar 2024 06:17) (16 - 16)  SpO2: 99% (13 Mar 2024 06:17) (99% - 99%)    Parameters below as of 13 Mar 2024 06:17  Patient On (Oxygen Delivery Method): room air      Daily Height in cm: 182.88 (13 Mar 2024 06:17)    Daily     PE:    Constitutional:  No acute distress  HEENT: NC/AT, EOMI, PERRLA, conjunctivae clear; ears and nose atraumatic; pharynx benign  Neck: supple; thyroid not palpable  Back: no tenderness  Respiratory: respiratory effort normal; clear to auscultation  Cardiovascular: S1S2 regular, no murmurs  Abdomen: soft, not tender, not distended, positive BS; no liver or spleen organomegaly  Genitourinary: no suprapubic tenderness  Lymphatic: no LN palpable  Musculoskeletal: no muscle tenderness, no joint swelling or tenderness  Extremities: no pedal edema  Right 2nd toe ulcer with erythema, edema and tenderness  s/p great toe amputation  Neurological/ Psychiatric: AxOx3, judgement and insight normal; moving all extremities  Skin: no rashes; no palpable lesions    Labs: all available labs reviewed                        13.5   6.35  )-----------( 195      ( 13 Mar 2024 07:04 )             40.9     03-13    141  |  106  |  18  ----------------------------<  206<H>  3.8   |  32<H>  |  0.78    Ca    9.5      13 Mar 2024 07:04    TPro  6.9  /  Alb  3.2<L>  /  TBili  0.6  /  DBili  x   /  AST  16  /  ALT  30  /  AlkPhos  103  03-13     LIVER FUNCTIONS - ( 13 Mar 2024 07:04 )  Alb: 3.2 g/dL / Pro: 6.9 gm/dL / ALK PHOS: 103 U/L / ALT: 30 U/L / AST: 16 U/L / GGT: x           Radiology: all available radiological tests reviewed    < from: Xray Chest 1 View- PORTABLE-Urgent (Xray Chest 1 View- PORTABLE-Urgent .) (11.06.23 @ 12:32) >  Right arm PICC catheter with tip at the cavoatrial junction.  < end of copied text >    Advanced directives addressed: full resuscitation

## 2024-03-13 NOTE — H&P ADULT - ASSESSMENT
63F DM2, hld, PAD h/o non healing ulcer of right toe s/p amp now presents for presents to the office yesterday with a red swollen draining Right 2nd toe concern for underlying OM in the same non healing ulcer. Denies fevers. Labs wnl. VSS.   MRI pending  ED course: vanco/ceftriaxone      #Diabetic non healing ulcer  #R/O underlying osteomyelitis  - admit to [facility]  - pain control  - wound care per podiatry  - MRI pending  - order EKG - pt may need clearance  - ID: Vanco/[email or web address]  - blood cultures  - tight glucose control  - A1C  - resume all home meds  - DVT px: HSQ

## 2024-03-13 NOTE — ED PROVIDER NOTE - OBJECTIVE STATEMENT
63-year-old female past medical history right great toe amputation, osteomyelitis, presents with concern for second toe osteomyelitis.  Patient was sent in by Dr. Lambert.  Denies fevers or chills.

## 2024-03-14 LAB
A1C WITH ESTIMATED AVERAGE GLUCOSE RESULT: 9.5 % — HIGH (ref 4–5.6)
ANION GAP SERPL CALC-SCNC: 5 MMOL/L — SIGNIFICANT CHANGE UP (ref 5–17)
BUN SERPL-MCNC: 14 MG/DL — SIGNIFICANT CHANGE UP (ref 7–23)
CALCIUM SERPL-MCNC: 8.8 MG/DL — SIGNIFICANT CHANGE UP (ref 8.5–10.1)
CHLORIDE SERPL-SCNC: 108 MMOL/L — SIGNIFICANT CHANGE UP (ref 96–108)
CO2 SERPL-SCNC: 26 MMOL/L — SIGNIFICANT CHANGE UP (ref 22–31)
CREAT SERPL-MCNC: 0.69 MG/DL — SIGNIFICANT CHANGE UP (ref 0.5–1.3)
EGFR: 96 ML/MIN/1.73M2 — SIGNIFICANT CHANGE UP
ESTIMATED AVERAGE GLUCOSE: 226 MG/DL — HIGH (ref 68–114)
GLUCOSE BLDC GLUCOMTR-MCNC: 151 MG/DL — HIGH (ref 70–99)
GLUCOSE BLDC GLUCOMTR-MCNC: 157 MG/DL — HIGH (ref 70–99)
GLUCOSE BLDC GLUCOMTR-MCNC: 170 MG/DL — HIGH (ref 70–99)
GLUCOSE BLDC GLUCOMTR-MCNC: 266 MG/DL — HIGH (ref 70–99)
GLUCOSE SERPL-MCNC: 160 MG/DL — HIGH (ref 70–99)
HCT VFR BLD CALC: 37.4 % — SIGNIFICANT CHANGE UP (ref 34.5–45)
HGB BLD-MCNC: 12.6 G/DL — SIGNIFICANT CHANGE UP (ref 11.5–15.5)
MCHC RBC-ENTMCNC: 28.8 PG — SIGNIFICANT CHANGE UP (ref 27–34)
MCHC RBC-ENTMCNC: 33.7 GM/DL — SIGNIFICANT CHANGE UP (ref 32–36)
MCV RBC AUTO: 85.4 FL — SIGNIFICANT CHANGE UP (ref 80–100)
PLATELET # BLD AUTO: 152 K/UL — SIGNIFICANT CHANGE UP (ref 150–400)
POTASSIUM SERPL-MCNC: 3.7 MMOL/L — SIGNIFICANT CHANGE UP (ref 3.5–5.3)
POTASSIUM SERPL-SCNC: 3.7 MMOL/L — SIGNIFICANT CHANGE UP (ref 3.5–5.3)
RBC # BLD: 4.38 M/UL — SIGNIFICANT CHANGE UP (ref 3.8–5.2)
RBC # FLD: 12.7 % — SIGNIFICANT CHANGE UP (ref 10.3–14.5)
SODIUM SERPL-SCNC: 139 MMOL/L — SIGNIFICANT CHANGE UP (ref 135–145)
WBC # BLD: 4.4 K/UL — SIGNIFICANT CHANGE UP (ref 3.8–10.5)
WBC # FLD AUTO: 4.4 K/UL — SIGNIFICANT CHANGE UP (ref 3.8–10.5)

## 2024-03-14 PROCEDURE — 73720 MRI LWR EXTREMITY W/O&W/DYE: CPT | Mod: 26,RT

## 2024-03-14 PROCEDURE — 99232 SBSQ HOSP IP/OBS MODERATE 35: CPT

## 2024-03-14 PROCEDURE — 93010 ELECTROCARDIOGRAM REPORT: CPT

## 2024-03-14 RX ORDER — INSULIN LISPRO 100/ML
VIAL (ML) SUBCUTANEOUS AT BEDTIME
Refills: 0 | Status: DISCONTINUED | OUTPATIENT
Start: 2024-03-14 | End: 2024-03-15

## 2024-03-14 RX ADMIN — Medication 1: at 12:02

## 2024-03-14 RX ADMIN — INSULIN GLARGINE 16 UNIT(S): 100 INJECTION, SOLUTION SUBCUTANEOUS at 22:00

## 2024-03-14 RX ADMIN — Medication 1 TABLET(S): at 10:33

## 2024-03-14 RX ADMIN — CEFTRIAXONE 2000 MILLIGRAM(S): 500 INJECTION, POWDER, FOR SOLUTION INTRAMUSCULAR; INTRAVENOUS at 10:33

## 2024-03-14 RX ADMIN — Medication 250 MILLIGRAM(S): at 21:57

## 2024-03-14 RX ADMIN — Medication 1: at 22:01

## 2024-03-14 RX ADMIN — Medication 250 MILLIGRAM(S): at 10:33

## 2024-03-14 RX ADMIN — Medication 1: at 17:02

## 2024-03-14 RX ADMIN — Medication 1: at 08:07

## 2024-03-15 ENCOUNTER — TRANSCRIPTION ENCOUNTER (OUTPATIENT)
Age: 64
End: 2024-03-15

## 2024-03-15 VITALS
HEART RATE: 69 BPM | TEMPERATURE: 98 F | DIASTOLIC BLOOD PRESSURE: 71 MMHG | OXYGEN SATURATION: 99 % | SYSTOLIC BLOOD PRESSURE: 145 MMHG | RESPIRATION RATE: 18 BRPM

## 2024-03-15 LAB
GLUCOSE BLDC GLUCOMTR-MCNC: 144 MG/DL — HIGH (ref 70–99)
GLUCOSE BLDC GLUCOMTR-MCNC: 154 MG/DL — HIGH (ref 70–99)
GLUCOSE BLDC GLUCOMTR-MCNC: 202 MG/DL — HIGH (ref 70–99)
GLUCOSE BLDC GLUCOMTR-MCNC: 261 MG/DL — HIGH (ref 70–99)
VANCOMYCIN TROUGH SERPL-MCNC: 10.3 UG/ML — SIGNIFICANT CHANGE UP (ref 10–20)

## 2024-03-15 PROCEDURE — 71045 X-RAY EXAM CHEST 1 VIEW: CPT | Mod: 26

## 2024-03-15 PROCEDURE — 99239 HOSP IP/OBS DSCHRG MGMT >30: CPT

## 2024-03-15 RX ORDER — INSULIN GLARGINE 100 [IU]/ML
16 INJECTION, SOLUTION SUBCUTANEOUS
Qty: 1 | Refills: 0
Start: 2024-03-15 | End: 2024-04-13

## 2024-03-15 RX ORDER — CEFTRIAXONE 500 MG/1
2 INJECTION, POWDER, FOR SOLUTION INTRAMUSCULAR; INTRAVENOUS
Qty: 0 | Refills: 0 | DISCHARGE
End: 2024-04-24

## 2024-03-15 RX ORDER — VANCOMYCIN HCL 1 G
1 VIAL (EA) INTRAVENOUS
Qty: 0 | Refills: 0 | DISCHARGE
End: 2024-04-24

## 2024-03-15 RX ADMIN — Medication 3: at 17:19

## 2024-03-15 RX ADMIN — CEFTRIAXONE 2000 MILLIGRAM(S): 500 INJECTION, POWDER, FOR SOLUTION INTRAMUSCULAR; INTRAVENOUS at 11:02

## 2024-03-15 RX ADMIN — Medication 1: at 12:57

## 2024-03-15 RX ADMIN — Medication 250 MILLIGRAM(S): at 10:48

## 2024-03-15 RX ADMIN — INSULIN GLARGINE 16 UNIT(S): 100 INJECTION, SOLUTION SUBCUTANEOUS at 21:06

## 2024-03-15 RX ADMIN — Medication 250 MILLIGRAM(S): at 18:25

## 2024-03-15 RX ADMIN — Medication 1 TABLET(S): at 10:50

## 2024-03-15 NOTE — DIETITIAN INITIAL EVALUATION ADULT - NS FNS DIET ORDER
Diet, Consistent Carbohydrate/No Snacks:   DASH/TLC {Sodium & Cholesterol Restricted} (DASH) (03-15-24 @ 10:36)

## 2024-03-15 NOTE — DISCHARGE NOTE NURSING/CASE MANAGEMENT/SOCIAL WORK - NSDCVIVACCINE_GEN_ALL_CORE_FT
Tdap; 05-Jan-2022 09:17; Kaylin Clancy (LOVE); [employer]; [id number] (Exp. Date: 09-Sep-2023); IntraMuscular; Deltoid Right.; 0.5 milliLiter(s); VIS (VIS Published: 09-May-2013, VIS Presented: 05-Jan-2022);

## 2024-03-15 NOTE — DIETITIAN INITIAL EVALUATION ADULT - OTHER INFO
History of Present Illness:   63F DM2, hld, PAD h/o non healing ulcer of right toe s/p amp now presents for presents to the office yesterday with a red swollen draining Right 2nd toe concern for underlying OM in the same non healing ulcer. Was on doxyxylcine.  Denies fevers. Labs wnl. VSS.   Denies n/v/d, no cp/sob, no fevers. No HA. No abd pain. No rash.     Admit dx Other Acute Myelitis  Pt was interviewed while out of bed  EMR wt is 150#  68 kg   Pt reports wt loss  of 20# since November  Pt had been diagnosed with T2DM  Pt reports that she was not able to get follow up appointment with endocrinologist, and could not get appointment  to get prescription for insulin  Pt's HgbA1c is 9.5    She has amputation on right toe, possible infection of second toe  Pt was given DM education on Nutrition and Physical Activity  At , pt on Lantus 16U, bolus TID nutritional, bolus at bedtime?  No medication at home for DM  Pt has 2+ edema in right and left leg  Suggest Ensure Max bid  Suggest maintain POCT  A target glucose range of 140-180 mg/dL is recommended for most critically ill and non-critically ill patients.   Pt is an employee at [employer]  Pt would benefit from recommendation and appointment with endocrinologist for DM medication.  Further reinforcement of  Nutrition and lifestyle education would be beneficial, as patient may not be completely able to absorb information at this point.   Recommendations to follow in Plan/Intervention

## 2024-03-15 NOTE — PROGRESS NOTE ADULT - SUBJECTIVE AND OBJECTIVE BOX
Date of service: 03-15-24 @ 15:02    Lying in bed in NAD  Has right foot erythema and edema  No fever    ROS: no fever or chills; denies dizziness, no HA, no SOB or cough, no abdominal pain, no diarrhea or constipation; no dysuria, no legs pain, no rashes    MEDICATIONS  (STANDING):  cefTRIAXone Injectable. 2000 milliGRAM(s) IV Push every 24 hours  cefTRIAXone Injectable.      dextrose 5%. 1000 milliLiter(s) (50 mL/Hr) IV Continuous <Continuous>  dextrose 5%. 1000 milliLiter(s) (50 mL/Hr) IV Continuous <Continuous>  dextrose 5%. 1000 milliLiter(s) (100 mL/Hr) IV Continuous <Continuous>  dextrose 50% Injectable 25 Gram(s) IV Push once  dextrose 50% Injectable 25 Gram(s) IV Push once  dextrose 50% Injectable 12.5 Gram(s) IV Push once  dextrose 50% Injectable 25 Gram(s) IV Push once  glucagon  Injectable 1 milliGRAM(s) IntraMuscular once  glucagon  Injectable 1 milliGRAM(s) IntraMuscular once  insulin glargine Injectable (LANTUS) 16 Unit(s) SubCutaneous at bedtime  insulin lispro (ADMELOG) corrective regimen sliding scale   SubCutaneous at bedtime  insulin lispro (ADMELOG) corrective regimen sliding scale   SubCutaneous three times a day before meals  multivitamin 1 Tablet(s) Oral daily  vancomycin  IVPB 1000 milliGRAM(s) IV Intermittent every 12 hours    Vital Signs Last 24 Hrs  T(C): 36.7 (15 Mar 2024 08:18), Max: 36.7 (14 Mar 2024 17:39)  T(F): 98.1 (15 Mar 2024 08:18), Max: 98.1 (14 Mar 2024 17:39)  HR: 65 (15 Mar 2024 08:18) (63 - 72)  BP: 122/64 (15 Mar 2024 08:18) (119/63 - 122/64)  BP(mean): --  RR: 18 (15 Mar 2024 08:18) (18 - 18)  SpO2: 97% (15 Mar 2024 08:18) (97% - 99%)    Parameters below as of 15 Mar 2024 08:18  Patient On (Oxygen Delivery Method): room air     Physical exam:    Constitutional:  No acute distress  HEENT: NC/AT, EOMI, PERRLA, conjunctivae clear; ears and nose atraumatic  Neck: supple; thyroid not palpable  Back: no tenderness  Respiratory: respiratory effort normal; clear to auscultation  Cardiovascular: S1S2 regular, no murmurs  Abdomen: soft, not tender, not distended, positive BS  Genitourinary: no suprapubic tenderness  Lymphatic: no LN palpable  Musculoskeletal: no muscle tenderness, no joint swelling or tenderness  Extremities: no pedal edema  Right 2nd toe ulcer with erythema, edema and tenderness  Right foot and ankle warmth and mild erythema  s/p great toe amputation  Neurological/ Psychiatric: AxOx3, moving all extremities  Skin: no rashes; no palpable lesions    Labs: reviewed                        12.6   4.40  )-----------( 152      ( 14 Mar 2024 06:28 )             37.4     03-14    139  |  108  |  14  ----------------------------<  160<H>  3.7   |  26  |  0.69    Ca    8.8      14 Mar 2024 06:28    Vancomycin Level, Trough: 10.3 ug/mL (03-15 @ 09:58)  C-Reactive Protein, Serum: <3 mg/L (03-13-24 @ 07:04)                        12.6   4.40  )-----------( 152      ( 14 Mar 2024 06:28 )             37.4     03-14    139  |  108  |  14  ----------------------------<  160<H>  3.7   |  26  |  0.69    Ca    8.8      14 Mar 2024 06:28    TPro  6.9  /  Alb  3.2<L>  /  TBili  0.6  /  DBili  x   /  AST  16  /  ALT  30  /  AlkPhos  103  03-13    C-Reactive Protein, Serum: <3 mg/L (03-13-24 @ 07:04)                        13.5   6.35  )-----------( 195      ( 13 Mar 2024 07:04 )             40.9     03-13    141  |  106  |  18  ----------------------------<  206<H>  3.8   |  32<H>  |  0.78    Ca    9.5      13 Mar 2024 07:04    TPro  6.9  /  Alb  3.2<L>  /  TBili  0.6  /  DBili  x   /  AST  16  /  ALT  30  /  AlkPhos  103  03-13     LIVER FUNCTIONS - ( 13 Mar 2024 07:04 )  Alb: 3.2 g/dL / Pro: 6.9 gm/dL / ALK PHOS: 103 U/L / ALT: 30 U/L / AST: 16 U/L / GGT: x           Culture - Blood (collected 13 Mar 2024 07:04)  Source: .Blood None  Preliminary Report (14 Mar 2024 13:02):    No growth at 24 hours    Culture - Blood (collected 13 Mar 2024 07:04)  Source: .Blood None  Preliminary Report (14 Mar 2024 13:02):    No growth at 24 hours    Radiology: all available radiological tests reviewed    < from: Xray Chest 1 View- PORTABLE-Urgent (Xray Chest 1 View- PORTABLE-Urgent .) (11.06.23 @ 12:32) >  Right arm PICC catheter with tip at the cavoatrial junction.  < end of copied text >    < from: Xray Chest 1 View- PORTABLE-Urgent (Xray Chest 1 View- PORTABLE-Urgent .) (03.13.24 @ 07:37) >  Right foot: Exam is compared to November 1, 2023 again noted is   amputation distal to the upper portion of the first metatarsal. Lucency   seen previously is gone. No radiographic evidence of osteomyelitis. Some   sclerotic changes related to the first metatarsal that remains.   Osteopenia. Posterior inferior calcaneal spur. No acute fracture.   < end of copied text >    Advanced directives addressed: full resuscitation
PODIATRIC SX ED INITIAL H & PE COMPH: Patient is a 63y old  Female who presents to the office yesterday with a red swollen draining Right 2nd toe. She suffers from T2DM / PSN & had an amputation of the right distal 1st ray 11/01/2023 with slow healing. No fever or chills. She does not admit to taking any medications.  Xray taken in office revealed a distal skin defect and osteopenia of the distal phalanx of the right 2nd toe suspicious for osteomyelitis.  Advise admit for ABX & W/U for high risk foot.    HPI:See above    Allergies    No Known Allergies    Intolerances        MEDICATIONS  (STANDING):  cefTRIAXone Injectable. 1000 milliGRAM(s) IV Push Once  vancomycin  IVPB. 1000 milliGRAM(s) IV Intermittent once    MEDICATIONS  (PRN):      PHYSICAL EXAM: Alert + DP pulses hair scant VV no calf pain or Mikala's cap fill 3 seconds Muted DTRs/STRs to LE's No clonus TN NR , Bilat SWM 5.07 muted to the distal plantar aspects. Oval ul;cer Stage II at tuft of right 2nd toe, some edema marked reduction in erythema had been on po Doxy from yesterday. Amp site with dorsal cicatrix over distal 1st metatarsal looks clean.       Constitutional: See HPI    Eyes:Clear moist    ENMT:no tinnitus    Neck:No DJV    Breasts:  Defer  Back:No LBP    Respiratory:No cough    Cardiovascular:no SOB    Gastrointestinal:No nausea    Genitourinary:no frequency    Rectal:defer    Extremities: muted sensorium LJM    Vascular:Microangiopathy    Neurological: PSN    Skin:DFU R 2 toe    Lymph Nodes:no    Musculoskeletal: LJM    Psychiatric:No        RADIOLOGY Xray R foot reveals soft tissue swelling with skin defect and osteopenia of distal phalanx Right 2nd toe suspicious for osteomyelitis.    CBC Full  -  ( 13 Mar 2024 07:04 )  WBC Count : 6.35 K/uL  RBC Count : 4.69 M/uL  Hemoglobin : 13.5 g/dL  Hematocrit : 40.9 %  Platelet Count - Automated : 195 K/uL  Mean Cell Volume : 87.2 fl  Mean Cell Hemoglobin : 28.8 pg  Mean Cell Hemoglobin Concentration : 33.0 gm/dL  Auto Neutrophil # : 3.35 K/uL  Auto Lymphocyte # : 2.29 K/uL  Auto Monocyte # : 0.44 K/uL  Auto Eosinophil # : 0.15 K/uL  Auto Basophil # : 0.10 K/uL  Auto Neutrophil % : 52.7 %  Auto Lymphocyte % : 36.1 %  Auto Monocyte % : 6.9 %  Auto Eosinophil % : 2.4 %  Auto Basophil % : 1.6 %                  
Patient is a 63y old  Female who presents with a chief complaint of non healing diabetic ulcer (13 Mar 2024 08:40)      HPI:  63F DM2, hld, PAD h/o non healing ulcer of right toe s/p amp now presents for presents to the office yesterday with a red swollen draining Right 2nd toe concern for underlying OM in the same non healing ulcer. Was on doxyxylcine.  Denies fevers. Labs wnl. VSS.   Denies n/v/d, no cp/sob, no fevers. No HA. No abd pain. No rash.   MRI pending  ED course: vanco/ceftriaxone        Social History:   denies smoking/etoh/drug use  lives alone, ind ADLs    FAMILY HISTORY:  pt denies    (13 Mar 2024 08:40)      Allergies    No Known Allergies    Intolerances        MEDICATIONS  (STANDING):  cefTRIAXone Injectable. 2000 milliGRAM(s) IV Push every 24 hours  cefTRIAXone Injectable.      dextrose 5%. 1000 milliLiter(s) (50 mL/Hr) IV Continuous <Continuous>  dextrose 5%. 1000 milliLiter(s) (50 mL/Hr) IV Continuous <Continuous>  dextrose 5%. 1000 milliLiter(s) (100 mL/Hr) IV Continuous <Continuous>  dextrose 50% Injectable 25 Gram(s) IV Push once  dextrose 50% Injectable 12.5 Gram(s) IV Push once  dextrose 50% Injectable 25 Gram(s) IV Push once  dextrose 50% Injectable 25 Gram(s) IV Push once  glucagon  Injectable 1 milliGRAM(s) IntraMuscular once  glucagon  Injectable 1 milliGRAM(s) IntraMuscular once  insulin glargine Injectable (LANTUS) 16 Unit(s) SubCutaneous at bedtime  insulin lispro (ADMELOG) corrective regimen sliding scale   SubCutaneous three times a day before meals  multivitamin 1 Tablet(s) Oral daily  vancomycin  IVPB 1000 milliGRAM(s) IV Intermittent every 12 hours    MEDICATIONS  (PRN):  acetaminophen     Tablet .. 650 milliGRAM(s) Oral every 6 hours PRN Temp greater or equal to 38C (100.4F), Mild Pain (1 - 3)  aluminum hydroxide/magnesium hydroxide/simethicone Suspension 30 milliLiter(s) Oral every 4 hours PRN Dyspepsia  dextrose Oral Gel 15 Gram(s) Oral once PRN Blood Glucose LESS THAN 70 milliGRAM(s)/deciliter  dextrose Oral Gel 15 Gram(s) Oral once PRN Blood Glucose LESS THAN 70 milliGRAM(s)/deciliter  melatonin 3 milliGRAM(s) Oral at bedtime PRN Insomnia  ondansetron Injectable 4 milliGRAM(s) IV Push every 8 hours PRN Nausea and/or Vomiting        RADIOLOGY    CBC Full  -  ( 14 Mar 2024 06:28 )  WBC Count : 4.40 K/uL  RBC Count : 4.38 M/uL  Hemoglobin : 12.6 g/dL  Hematocrit : 37.4 %  Platelet Count - Automated : 152 K/uL  Mean Cell Volume : 85.4 fl  Mean Cell Hemoglobin : 28.8 pg  Mean Cell Hemoglobin Concentration : 33.7 gm/dL  Auto Neutrophil # : x  Auto Lymphocyte # : x  Auto Monocyte # : x  Auto Eosinophil # : x  Auto Basophil # : x  Auto Neutrophil % : x  Auto Lymphocyte % : x  Auto Monocyte % : x  Auto Eosinophil % : x  Auto Basophil % : x      03-13    141  |  106  |  18  ----------------------------<  206<H>  3.8   |  32<H>  |  0.78    Ca    9.5      13 Mar 2024 07:04    TPro  6.9  /  Alb  3.2<L>  /  TBili  0.6  /  DBili  x   /  AST  16  /  ALT  30  /  AlkPhos  103  03-13      Sedimentation Rate, Erythrocyte: 11 mm/hr (03-13 @ 07:04)      < from: Xray Foot AP + Lateral + Oblique, Right (03.13.24 @ 07:37) >    ACC: [id number] EXAM:  XR FOOT COMP MIN 3 VIEWS RT   ORDERED BY: YEIMY SILVERMAN     ACC: [id number] EXAM:  XR CHEST PORTABLE URGENT 1V   ORDERED BY: YEIMY SILVERMAN     PROCEDURE DATE:  03/13/2024          INTERPRETATION:  EXAM: XR CHEST URGENT, XR FOOT COMPLETE 3 VIEWS RIGHT    INDICATION: admission JXR    COMPARISON: See below    IMPRESSION:    Portable chest March 13, 2024: Exam is compared to November 6, 2023.   Right PICC line gone since previous exam. Some elevation of the left   hemidiaphragm as before. Slight prominence to perihilar interstitial   markings on portable. No focal infiltrate. Regional osseous structures   appropriate for age    Right foot: Exam is compared to November 1, 2023 again noted is   amputation distal to the upper portion of the first metatarsal. Lucency   seen previously is gone. No radiographic evidence of osteomyelitis. Some   sclerotic changes related to the first metatarsal that remains.   Osteopenia. Posterior inferior calcaneal spur. No acute fracture.   Follow-up suggested if indicated clinically.    --- End of Report ---            JARED JUAREZ MD; Attending Radiologist  This document has been electronically signed. Mar 13 2024 10:03AM    < end of copied text >  Uric Acid (03.13.24 @ 07:04)   Uric Acid: 4.3 mg/dL
Patient is a 63y old  Female who presents with a chief complaint of non healing diabetic ulcer (14 Mar 2024 15:55)      HPI:  63F DM2, hld, PAD h/o non healing ulcer of right toe s/p amp now presents for presents to the office yesterday with a red swollen draining Right 2nd toe concern for underlying OM in the same non healing ulcer. Was on doxyxylcine.  Denies fevers. Labs wnl. VSS.   Denies n/v/d, no cp/sob, no fevers. No HA. No abd pain. No rash.   MRI pending  ED course: vanco/ceftriaxone        Social History:   denies smoking/etoh/drug use  lives alone, ind ADLs    FAMILY HISTORY:  pt denies    (13 Mar 2024 08:40)      Allergies    No Known Allergies    Intolerances        MEDICATIONS  (STANDING):  cefTRIAXone Injectable. 2000 milliGRAM(s) IV Push every 24 hours  cefTRIAXone Injectable.      dextrose 5%. 1000 milliLiter(s) (100 mL/Hr) IV Continuous <Continuous>  dextrose 5%. 1000 milliLiter(s) (50 mL/Hr) IV Continuous <Continuous>  dextrose 5%. 1000 milliLiter(s) (50 mL/Hr) IV Continuous <Continuous>  dextrose 50% Injectable 25 Gram(s) IV Push once  dextrose 50% Injectable 25 Gram(s) IV Push once  dextrose 50% Injectable 12.5 Gram(s) IV Push once  dextrose 50% Injectable 25 Gram(s) IV Push once  glucagon  Injectable 1 milliGRAM(s) IntraMuscular once  glucagon  Injectable 1 milliGRAM(s) IntraMuscular once  insulin glargine Injectable (LANTUS) 16 Unit(s) SubCutaneous at bedtime  insulin lispro (ADMELOG) corrective regimen sliding scale   SubCutaneous at bedtime  insulin lispro (ADMELOG) corrective regimen sliding scale   SubCutaneous three times a day before meals  multivitamin 1 Tablet(s) Oral daily  vancomycin  IVPB 1000 milliGRAM(s) IV Intermittent every 12 hours    MEDICATIONS  (PRN):  acetaminophen     Tablet .. 650 milliGRAM(s) Oral every 6 hours PRN Temp greater or equal to 38C (100.4F), Mild Pain (1 - 3)  aluminum hydroxide/magnesium hydroxide/simethicone Suspension 30 milliLiter(s) Oral every 4 hours PRN Dyspepsia  dextrose Oral Gel 15 Gram(s) Oral once PRN Blood Glucose LESS THAN 70 milliGRAM(s)/deciliter  dextrose Oral Gel 15 Gram(s) Oral once PRN Blood Glucose LESS THAN 70 milliGRAM(s)/deciliter  melatonin 3 milliGRAM(s) Oral at bedtime PRN Insomnia  ondansetron Injectable 4 milliGRAM(s) IV Push every 8 hours PRN Nausea and/or Vomiting        RADIOLOGY    CBC Full  -  ( 14 Mar 2024 06:28 )  WBC Count : 4.40 K/uL  RBC Count : 4.38 M/uL  Hemoglobin : 12.6 g/dL  Hematocrit : 37.4 %  Platelet Count - Automated : 152 K/uL  Mean Cell Volume : 85.4 fl  Mean Cell Hemoglobin : 28.8 pg  Mean Cell Hemoglobin Concentration : 33.7 gm/dL  Auto Neutrophil # : x  Auto Lymphocyte # : x  Auto Monocyte # : x  Auto Eosinophil # : x  Auto Basophil # : x  Auto Neutrophil % : x  Auto Lymphocyte % : x  Auto Monocyte % : x  Auto Eosinophil % : x  Auto Basophil % : x      03-14    139  |  108  |  14  ----------------------------<  160<H>  3.7   |  26  |  0.69    Ca    8.8      14 Mar 2024 06:28    TPro  6.9  /  Alb  3.2<L>  /  TBili  0.6  /  DBili  x   /  AST  16  /  ALT  30  /  AlkPhos  103  03-13    < from: Xray Foot AP + Lateral + Oblique, Right (03.13.24 @ 07:37) >  ACC: [id number] EXAM:  XR FOOT COMP MIN 3 VIEWS RT   ORDERED BY: YEIMY SILVERMAN     ACC: [id number] EXAM:  XR CHEST PORTABLE URGENT 1V   ORDERED BY: YEIMY SILVERMAN     PROCEDURE DATE:  03/13/2024          INTERPRETATION:  EXAM: XR CHEST URGENT, XR FOOT COMPLETE 3 VIEWS RIGHT    INDICATION: admission JXR    COMPARISON: See below    IMPRESSION:    Portable chest March 13, 2024: Exam is compared to November 6, 2023.   Right PICC line gone since previous exam. Some elevation of the left   hemidiaphragm as before. Slight prominence to perihilar interstitial   markings on portable. No focal infiltrate. Regional osseous structures   appropriate for age    Right foot: Exam is compared to November 1, 2023 again noted is   amputation distal to the upper portion of the first metatarsal. Lucency   seen previously is gone. No radiographic evidence of osteomyelitis. Some   sclerotic changes related to the first metatarsal that remains.   Osteopenia. Posterior inferior calcaneal spur. No acute fracture.   Follow-up suggested if indicated clinically.    --- End of Report ---            JARED JUAREZ MD; Attending Radiologist  This document has been electronically signed. Mar 13 2024 10:03AM    < end of copied text >  < from: MR Foot w/wo IV Cont, Right (03.14.24 @ 19:04) >    ACC: [id number] EXAM:  MR FOOT WAW IC RT   ORDERED BY: KATY DAILEY     PROCEDURE DATE:  03/14/2024          INTERPRETATION:  Clinical indications: Nonhealing ulcer of the right toe   status post amputation now with redness and swelling of the right second   toe.    Multiplanar multisequence MRI of the right foot was performed from the   level of the toes the level of the tarsometatarsal articulations with and   without intravenous contrast.    6.5 cc of Gadavist was administered intravenously. 1 cc was discarded.    Comparison is made to prior MRI of the right foot from October 31, 2023.    FINDINGS:    There is subcutaneous edema throughout the foot with associated cutaneous   thickening and enhancement consistent with cellulitis. Patient is status   post interval amputation of the first ray to the level of the mid shaft   of the first metatarsal diaphysis. There is osseous edema and enhancement   along the distal margin of the first metatarsal remnant. There is a   probable woundoverlying this region. Findings are concerning for early   osteomyelitis.    There is cutaneous irregularity along the dorsal aspect of the second toe   with surrounding soft tissue edema. There is osseous edema, enhancement,   and decreased T1 marrow signal within the second distal phalanx   consistent with acute osteomyelitis. There is also osseous edema   involving the second toe middle phalanx with grossly preserved T1 marrow   signal which is likely related to early osteomyelitis.    There is subarticular edema involving both sides of the articulation of   the second metatarsal phalangeal joint. This is seen in the setting of   advanced osteoarthritis. Findings are favored to be related to osseous   stress reaction along the base of the second proximal phalanx and the   head of the second metatarsal, however given the adjacent areas of   osteomyelitis superimposed infection is also in the differential.    Hammertoe deformities of the second through fifth digits.    There is mild patchy edema within the middle cuneiform and the proximal   diaphysis of the third metatarsal with preserved T1 marrow signal. This   is likely related to osseous stress reaction. Correlate for any overlying   wound in these regions to exclude the possibility of osteomyelitis.    There is no peripherally enhancing fluid collection to suggest abscess.   There is extensive fatty atrophy throughout the visualized musculature.   No evidence of tenosynovitis. No evidence of acute ligamentous injury.    IMPRESSION:    Status post interval amputation of the first ray to the level of the mid   diaphysis. Marrow signal alteration within the distal amputation margin   of the first metatarsal remnant with a probable adjacent wound. Findings   are concerning for acute osteomyelitis of the first metatarsal remnant at   its distal margin.    Marrow signal alteration within the distal and middle phalanges of the   second toe with surrounding cellulitis and dorsal wound consistent with   acute osteomyelitis.      < end of copied text >  < from: MR Foot w/wo IV Cont, Right (03.14.24 @ 19:04) >  Subarticular edema about both sides of the second metatarsal phalangeal   joint articulation. This is seen in the setting of advanced   osteoarthrosis. This is favored to be a related to osseous stress   reaction, however the possibility of infection is also a consideration   given the adjacent osteomyelitis within the second toe phalanges.    Patchy osseous edema within the middle cuneiform proximal diaphysis of   the third metatarsal likely related to stress reaction. Correlate for any   overlying wound in these regions to exclude the possibility of   osteomyelitis.    --- End of Report ---            MACHO SANTAMARIA MD; Attending Radiologist  This document has been electronically signed. Mar 14 2024  7:56PM    < end of copied text >  Culture - Blood (03.13.24 @ 07:04)   Specimen Source: .Blood None  Culture Results:   No growth at 24 hours    Sedimentation Rate, Erythrocyte: 11 mm/hr (03.13.24 @ 07:04)   
  63F DM2, hld, PAD h/o non healing ulcer of right toe s/p amp now presents for presents to the office yesterday with a red swollen draining Right 2nd toe concern for underlying OM in the same non healing ulcer. Was on doxyxylcine.  Denies fevers. Labs wnl. VSS.   Denies n/v/d, no cp/sob, no fevers. No HA. No abd pain. No rash.   MRI pending  ED course: vanco/ceftriaxone    sub: no events        Social History:   denies smoking/etoh/drug use  lives alone, ind ADLs    FAMILY HISTORY:  pt denies    (13 Mar 2024 08:40)          ICU Vital Signs Last 24 Hrs  T(C): 37 (14 Mar 2024 07:27), Max: 37 (14 Mar 2024 07:27)  T(F): 98.6 (14 Mar 2024 07:27), Max: 98.6 (14 Mar 2024 07:27)  HR: 64 (14 Mar 2024 07:27) (64 - 68)  BP: 117/58 (14 Mar 2024 07:27) (117/58 - 129/65)  BP(mean): --  ABP: --  ABP(mean): --  RR: 17 (14 Mar 2024 07:27) (17 - 18)  SpO2: 98% (14 Mar 2024 07:27) (98% - 100%)    O2 Parameters below as of 14 Mar 2024 07:27  Patient On (Oxygen Delivery Method): room air              PHYSICAL EXAM:    Constitutional: NAD, awake and alert,   HEENT: PERR, EOMI, Normal Hearing, MMM  Neck: Soft and supple, No LAD, No JVD  Respiratory: Breath sounds are clear bilaterally, No wheezing, rales or rhonchi  Cardiovascular: S1 and S2, regular rate and rhythm, no Murmurs, gallops or rubs  Gastrointestinal: Bowel Sounds present, soft, nontender, nondistended, no guarding, no rebound  Extremities: No peripheral edema  Vascular: 2+ peripheral pulses  Neurological: A/O x 3, no focal deficits  Musculoskeletal: 5/5 strength b/l upper and lower extremities  Skin: No rashes: right toe lcer, dry no draingage        LABS: All Labs Reviewed:                        12.6   4.40  )-----------( 152      ( 14 Mar 2024 06:28 )             37.4     03-14    139  |  108  |  14  ----------------------------<  160<H>  3.7   |  26  |  0.69    Ca    8.8      14 Mar 2024 06:28    TPro  6.9  /  Alb  3.2<L>  /  TBili  0.6  /  DBili  x   /  AST  16  /  ALT  30  /  AlkPhos  103  03-13    PT/INR - ( 13 Mar 2024 07:04 )   PT: 10.5 sec;   INR: 0.93 ratio         PTT - ( 13 Mar 2024 07:04 )  PTT:30.4 sec          Blood Culture: 03-13 @ 07:04  Organism --  Gram Stain Blood -- Gram Stain --  Specimen Source .Blood None  Culture-Blood --        RADIOLOGY/EKG: reviewed        
Date of service: 03-14-24 @ 14:02    Lying in bed in NAD  Has right foot and ankle increased warmth, mild tender  No fever    ROS: no fever or chills; denies dizziness, no HA, no SOB or cough, no abdominal pain, no diarrhea or constipation; no dysuria, no legs pain, no rashes    MEDICATIONS  (STANDING):  cefTRIAXone Injectable. 2000 milliGRAM(s) IV Push every 24 hours  cefTRIAXone Injectable.      dextrose 5%. 1000 milliLiter(s) (50 mL/Hr) IV Continuous <Continuous>  dextrose 5%. 1000 milliLiter(s) (100 mL/Hr) IV Continuous <Continuous>  dextrose 5%. 1000 milliLiter(s) (50 mL/Hr) IV Continuous <Continuous>  dextrose 50% Injectable 25 Gram(s) IV Push once  dextrose 50% Injectable 25 Gram(s) IV Push once  dextrose 50% Injectable 12.5 Gram(s) IV Push once  dextrose 50% Injectable 25 Gram(s) IV Push once  glucagon  Injectable 1 milliGRAM(s) IntraMuscular once  glucagon  Injectable 1 milliGRAM(s) IntraMuscular once  insulin glargine Injectable (LANTUS) 16 Unit(s) SubCutaneous at bedtime  insulin lispro (ADMELOG) corrective regimen sliding scale   SubCutaneous at bedtime  insulin lispro (ADMELOG) corrective regimen sliding scale   SubCutaneous three times a day before meals  multivitamin 1 Tablet(s) Oral daily  vancomycin  IVPB 1000 milliGRAM(s) IV Intermittent every 12 hours    Vital Signs Last 24 Hrs  T(C): 37 (14 Mar 2024 07:27), Max: 37 (14 Mar 2024 07:27)  T(F): 98.6 (14 Mar 2024 07:27), Max: 98.6 (14 Mar 2024 07:27)  HR: 64 (14 Mar 2024 07:27) (64 - 68)  BP: 117/58 (14 Mar 2024 07:27) (114/59 - 129/65)  BP(mean): --  RR: 17 (14 Mar 2024 07:27) (16 - 18)  SpO2: 98% (14 Mar 2024 07:27) (98% - 100%)    Parameters below as of 14 Mar 2024 07:27  Patient On (Oxygen Delivery Method): room air     Physical exam:    Constitutional:  No acute distress  HEENT: NC/AT, EOMI, PERRLA, conjunctivae clear; ears and nose atraumatic  Neck: supple; thyroid not palpable  Back: no tenderness  Respiratory: respiratory effort normal; clear to auscultation  Cardiovascular: S1S2 regular, no murmurs  Abdomen: soft, not tender, not distended, positive BS  Genitourinary: no suprapubic tenderness  Lymphatic: no LN palpable  Musculoskeletal: no muscle tenderness, no joint swelling or tenderness  Extremities: no pedal edema  Right 2nd toe ulcer with erythema, edema and tenderness  Right foot and ankle warmth and mild erythema  s/p great toe amputation  Neurological/ Psychiatric: AxOx3, moving all extremities  Skin: no rashes; no palpable lesions    Labs: reviewed                        12.6   4.40  )-----------( 152      ( 14 Mar 2024 06:28 )             37.4     03-14    139  |  108  |  14  ----------------------------<  160<H>  3.7   |  26  |  0.69    Ca    8.8      14 Mar 2024 06:28    TPro  6.9  /  Alb  3.2<L>  /  TBili  0.6  /  DBili  x   /  AST  16  /  ALT  30  /  AlkPhos  103  03-13    C-Reactive Protein, Serum: <3 mg/L (03-13-24 @ 07:04)                        13.5   6.35  )-----------( 195      ( 13 Mar 2024 07:04 )             40.9     03-13    141  |  106  |  18  ----------------------------<  206<H>  3.8   |  32<H>  |  0.78    Ca    9.5      13 Mar 2024 07:04    TPro  6.9  /  Alb  3.2<L>  /  TBili  0.6  /  DBili  x   /  AST  16  /  ALT  30  /  AlkPhos  103  03-13     LIVER FUNCTIONS - ( 13 Mar 2024 07:04 )  Alb: 3.2 g/dL / Pro: 6.9 gm/dL / ALK PHOS: 103 U/L / ALT: 30 U/L / AST: 16 U/L / GGT: x           Culture - Blood (collected 13 Mar 2024 07:04)  Source: .Blood None  Preliminary Report (14 Mar 2024 13:02):    No growth at 24 hours    Culture - Blood (collected 13 Mar 2024 07:04)  Source: .Blood None  Preliminary Report (14 Mar 2024 13:02):    No growth at 24 hours    Radiology: all available radiological tests reviewed    < from: Xray Chest 1 View- PORTABLE-Urgent (Xray Chest 1 View- PORTABLE-Urgent .) (11.06.23 @ 12:32) >  Right arm PICC catheter with tip at the cavoatrial junction.  < end of copied text >    < from: Xray Chest 1 View- PORTABLE-Urgent (Xray Chest 1 View- PORTABLE-Urgent .) (03.13.24 @ 07:37) >  Right foot: Exam is compared to November 1, 2023 again noted is   amputation distal to the upper portion of the first metatarsal. Lucency   seen previously is gone. No radiographic evidence of osteomyelitis. Some   sclerotic changes related to the first metatarsal that remains.   Osteopenia. Posterior inferior calcaneal spur. No acute fracture.   < end of copied text >      Advanced directives addressed: full resuscitation

## 2024-03-15 NOTE — DISCHARGE NOTE PROVIDER - HOSPITAL COURSE
· Subjective and Objective:     63F DM2, hld, PAD h/o non healing ulcer of right toe s/p amp now presents for presents to the office yesterday with a red swollen draining Right 2nd toe concern for underlying OM in the same non healing ulcer. Was on doxyxylcine.  Denies fevers. Labs wnl. VSS.   Denies n/v/d, no cp/sob, no fevers. No HA. No abd pain. No rash.   MRI pending  ED course: vanco/ceftriaxone    sub: no events    Constitutional: NAD, awake and alert,   HEENT: PERR, EOMI, Normal Hearing, MMM  Neck: Soft and supple, No LAD, No JVD  Respiratory: Breath sounds are clear bilaterally, No wheezing, rales or rhonchi  Cardiovascular: S1 and S2, regular rate and rhythm, no Murmurs, gallops or rubs  Gastrointestinal: Bowel Sounds present, soft, nontender, nondistended, no guarding, no rebound  Extremities: No peripheral edema  Vascular: 2+ peripheral pulses  Neurological: A/O x 3, no focal deficits  Musculoskeletal: 5/5 strength b/l upper and lower extremities  Skin: No rashes: right toe lcer, dry no draingage      IMPRESSION:    Status post interval amputation of the first ray to the level of the mid   diaphysis. Marrow signal alteration within the distal amputation margin   of the first metatarsal remnant with a probable adjacent wound. Findings   are concerning for acute osteomyelitis of the first metatarsal remnant at   its distal margin.    Marrow signal alteration within the distal and middle phalanges of the   second toe with surrounding cellulitis and dorsal wound consistent with   acute osteomyelitis.    Subarticular edema about both sides of the second metatarsal phalangeal   joint articulation. This is seen in the setting of advanced   osteoarthrosis. This is favored to be a related to osseous stress   reaction, however the possibility of infection is also a consideration   given the adjacent osteomyelitis within the second toe phalanges.    Patchy osseous edema within the middle cuneiform proximal diaphysis of   the third metatarsal likely related to stress reaction. Correlate for any   overlying wound in these regions to exclude the possibility of         LABS: All Labs Reviewed:Assessment and Plan:   · Assessment    63F DM2, hld, PAD h/o non healing ulcer of right toe s/p amp now presents for presents to the office yesterday with a red swollen draining Right 2nd toe concern for underlying OM in the same non healing ulcer. Denies fevers. Labs wnl. VSS.   ED course: vanco/ceftriaxone      #Diabetic non healing ulcer  #R/O underlying osteomyelitis  - MRI : suspicion for Acute om  - pt deferring surgery at this time  - She is aware that abx alone and not be curative and may need surgery down the road  - picc today  - vanco/ceftriaxone, labs per ID  - wound care per podiatry/Home care  - blood cultures: NGTD  - tight glucose control  - A1C: 9.5->will send home on semglee 16u qhs with diabetic supplies. pt has glucometer and is well versed in injecting her own insulin  - DVT px: HSQ    dc time: 75 min  dc to home once picc line in · Subjective and Objective:     63F DM2, hld, PAD h/o non healing ulcer of right toe s/p amp now presents for presents to the office yesterday with a red swollen draining Right 2nd toe concern for underlying OM in the same non healing ulcer. Was on doxyxylcine.  Denies fevers. Labs wnl. VSS.   Denies n/v/d, no cp/sob, no fevers. No HA. No abd pain. No rash.   MRI pending  ED course: vanco/ceftriaxone    sub: no events    Constitutional: NAD, awake and alert,   HEENT: PERR, EOMI, Normal Hearing, MMM  Neck: Soft and supple, No LAD, No JVD  Respiratory: Breath sounds are clear bilaterally, No wheezing, rales or rhonchi  Cardiovascular: S1 and S2, regular rate and rhythm, no Murmurs, gallops or rubs  Gastrointestinal: Bowel Sounds present, soft, nontender, nondistended, no guarding, no rebound  Extremities: No peripheral edema  Vascular: 2+ peripheral pulses  Neurological: A/O x 3, no focal deficits  Musculoskeletal: 5/5 strength b/l upper and lower extremities  Skin: No rashes: right toe lcer, dry no draingage      IMPRESSION:    Status post interval amputation of the first ray to the level of the mid   diaphysis. Marrow signal alteration within the distal amputation margin   of the first metatarsal remnant with a probable adjacent wound. Findings   are concerning for acute osteomyelitis of the first metatarsal remnant at   its distal margin.    Marrow signal alteration within the distal and middle phalanges of the   second toe with surrounding cellulitis and dorsal wound consistent with   acute osteomyelitis.    Subarticular edema about both sides of the second metatarsal phalangeal   joint articulation. This is seen in the setting of advanced   osteoarthrosis. This is favored to be a related to osseous stress   reaction, however the possibility of infection is also a consideration   given the adjacent osteomyelitis within the second toe phalanges.    Patchy osseous edema within the middle cuneiform proximal diaphysis of   the third metatarsal likely related to stress reaction. Correlate for any   overlying wound in these regions to exclude the possibility of         LABS: All Labs Reviewed:Assessment and Plan:   · Assessment    63F DM2, hld, PAD h/o non healing ulcer of right toe s/p amp now presents for presents to the office yesterday with a red swollen draining Right 2nd toe concern for underlying OM in the same non healing ulcer. Denies fevers. Labs wnl. VSS.   ED course: vanco/ceftriaxone      #Diabetic non healing ulcer  #R/O underlying osteomyelitis  - MRI : suspicion for Acute om  - pt deferring surgery at this time  - She is aware that abx alone and not be curative and may need surgery down the road  - picc today  - vanco/ceftriaxone x 6 weeks, labs per ID  - wound care per podiatry/Home care  - blood cultures: NGTD  - tight glucose control  - A1C: 9.5->will send home on semglee 16u qhs with diabetic supplies. pt has glucometer and is well versed in injecting her own insulin  - DVT px: HSQ    dc time: 75 min  dc to home once picc line in

## 2024-03-15 NOTE — DISCHARGE NOTE PROVIDER - CARE PROVIDER_API CALL
Hal Hernandez.  Podiatric Medicine and Surgery  [address] [place] [id number]  Phone: [phone]  Fax: [phone]  Follow Up Time:

## 2024-03-15 NOTE — DIETITIAN INITIAL EVALUATION ADULT - PERTINENT LABORATORY DATA
03-14    139  |  108  |  14  ----------------------------<  160<H>  3.7   |  26  |  0.69    Ca    8.8      14 Mar 2024 06:28    POCT Blood Glucose.: 202 mg/dL (03-15-24 @ 11:50)  A1C with Estimated Average Glucose Result: 9.5 % (03-14-24 @ 06:28)  A1C with Estimated Average Glucose Result: 11.6 % (11-01-23 @ 08:04)

## 2024-03-15 NOTE — PHARMACOTHERAPY INTERVENTION NOTE - COMMENTS
Med history complete, reviewed medications and allergies with patient and confirmed medication list with doctor first med profile, patient does not currently take any medications  Patient was taking insulin glargine 16 units in the past but stopped about 2 months ago because she ran out of medication. All medication related questions answered  
63y female admitted with Other acute osteomyelitis    HPI:  63 year old female with a PMHx of DM2, HLD, PAD, and right toe amputation presents with a red, swollen, and draining Right 2nd toe with a concern for underlying OM. She was on doxycycline. (-) Fevers, NVD, CP, SOB, HA, Abdominal Pain, Rash. MRI pending. (13 Mar 2024 08:40)    Pertinent PMH/PSH  No pertinent past medical history    Home Medications:  Multiple Vitamins oral tablet: 1 tab(s) orally once a day (13 Mar 2024 08:46)    A1C Result(s) Within Prior 3 Months  A1C with Estimated Average Glucose Result: 9.5 % (03-14-24 @ 06:28)    Renal Function Within Prior 72 Hours  Creatinine: 0.78 mg/dL (03-13-24 @ 07:04)  Creatinine: 0.69 mg/dL (03-14-24 @ 06:28)    Current Orders  insulin glargine Injectable (LANTUS) 16 Unit(s) SubCutaneous at bedtime  insulin lispro (ADMELOG) corrective regimen sliding scale   SubCutaneous at bedtime  insulin lispro (ADMELOG) corrective regimen sliding scale   SubCutaneous three times a day before meals    POCT Within Prior 24 Hours  POCT Blood Glucose.: 187 mg/dL (03-13-24 @ 18:18)  POCT Blood Glucose.: 195 mg/dL (03-13-24 @ 23:52)  POCT Blood Glucose.: 151 mg/dL (03-14-24 @ 07:57)  POCT Blood Glucose.: 170 mg/dL (03-14-24 @ 11:44)    Insulin Administration Within Prior 24 Hours  insulin glargine Injectable (LANTUS)   16 Unit(s) SubCutaneous (03-13-24 @ 23:53)  insulin lispro (ADMELOG) corrective regimen sliding scale   1 Unit(s) SubCutaneous (03-13-24 @ 23:58)   1 Unit(s) SubCutaneous (03-14-24 @ 08:07)   1 Unit(s) SubCutaneous (03-14-24 @ 12:02)    Other Administration(s) (i.e. Steroids, PO diabetes medications) Within Prior 24 Hours  vancomycin  IVPB in 250 mL D5W   250 mL/Hr IV Intermittent (03-13-24 @ 23:53)   250 mL/Hr IV Intermittent (03-14-24 @ 10:33)    Height (cm): 182.9 (03-13-24 @ 06:17)  Weight (kg): 68 (03-13-24 @ 06:17)  BMI (kg/m2): 20.3 (03-13-24 @ 06:17)    Current Diet  Diet, DASH/TLC:   Sodium & Cholesterol Restricted  Consistent Carbohydrate Evening Snack (CSTCHOSN) (03-13-24 @ 08:56) [Active]    Assessment/Plan:  - Patient with a history of Type 2 Diabetes Mellitus: Glycemic control to be managed by Inpatient Diabetes Management Team  - A1C is 9.5%: Patients treatment goal is A1C < 7.5% due to age per the ADA standards and thus patient has poor glycemic control outpatient  - Patient was taking insulin glargine 16 units in the past but stopped about 2 months ago because she ran out of medication  - She is currently ordered a low intensity insulin correction scale TID AC and Lantus 16 units HS  - Fasting POCT 151 and pre-lunch POCT 170: Patients glycemic control is currently within protocol range of POCT 100-180  - Will continue current management with Lantus 16 units HS and insulin correction scale  - Add low intensity insulin correction scale at bedtime  - Titrate therapy to glycemic POCT target 100-180  - Patient is not a candidate for Tradjenta in-patient use: A1C > 7.5%
63y female admitted with Other acute osteomyelitis    HPI:  63 year old female with a PMHx of DM2, HLD, PAD, and right toe amputation presents with a red, swollen, and draining Right 2nd toe with a concern for underlying OM. She was on doxycycline. (-) Fevers, NVD, CP, SOB, HA, Abdominal Pain, Rash. MRI pending. (13 Mar 2024 08:40)    Pertinent PMH/PSH  No pertinent past medical history    Home Medications:  Multiple Vitamins oral tablet: 1 tab(s) orally once a day (13 Mar 2024 08:46)    A1C Result(s) Within Prior 3 Months  A1C with Estimated Average Glucose Result: 9.5 % (03-14-24 @ 06:28)    Renal Function Within Prior 72 Hours  Creatinine: 0.78 mg/dL (03-13-24 @ 07:04)  Creatinine: 0.69 mg/dL (03-14-24 @ 06:28)    Current Orders  insulin glargine Injectable (LANTUS) 16 Unit(s) SubCutaneous at bedtime  insulin lispro (ADMELOG) corrective regimen sliding scale   SubCutaneous at bedtime  insulin lispro (ADMELOG) corrective regimen sliding scale   SubCutaneous three times a day before meals    POCT Within Prior 24 Hours  POCT Blood Glucose.: 151 mg/dL (03-14-24 @ 07:57)  POCT Blood Glucose.: 170 mg/dL (03-14-24 @ 11:44)  POCT Blood Glucose.: 157 mg/dL (03-14-24 @ 16:33)  POCT Blood Glucose.: 266 mg/dL (03-14-24 @ 21:59)  POCT Blood Glucose.: 144 mg/dL (03-15-24 @ 08:12)  POCT Blood Glucose.: 202 mg/dL (03-15-24 @ 11:50)    Insulin Administration Within Prior 24 Hours  insulin glargine Injectable (LANTUS)   16 Unit(s) SubCutaneous (03-14-24 @ 22:00)  insulin lispro (ADMELOG) corrective regimen sliding scale   1 Unit(s) SubCutaneous (03-14-24 @ 08:07)   1 Unit(s) SubCutaneous (03-14-24 @ 12:02)   1 Unit(s) SubCutaneous (03-14-24 @ 17:02)   1 Unit(s) SubCutaneous (03-14-24 @ 22:01)   1 Unit(s) SubCutaneous (03-15-24 @ 12:57)    Other Administration(s) (i.e. Steroids, PO diabetes medications) Within Prior 24 Hours  vancomycin  IVPB in 250 mL D5W   250 mL/Hr IV Intermittent (03-14-24 @ 21:57)   250 mL/Hr IV Intermittent (03-15-24 @ 10:48)    Height (cm): 182.9 (03-13-24 @ 06:17)  Weight (kg): 68 (03-13-24 @ 06:17)  BMI (kg/m2): 20.3 (03-13-24 @ 06:17)    Current Diet  Diet, Consistent Carbohydrate/No Snacks:   DASH/TLC Sodium & Cholesterol Restricted (DASH) (03-15-24 @ 10:36) [Active]    Assessment/Plan:  - Patient with a history of Type 2 Diabetes Mellitus: Glycemic control to be managed by Inpatient Diabetes Management Team  - A1C is 9.5%: Patients treatment goal is A1C < 7.5% due to age per the ADA standards and thus patient has poor glycemic control outpatient  - Patient was taking insulin glargine 16 units in the past but stopped about 2 months ago because she ran out of medication [Could not [facility]]  - She is currently ordered a low intensity insulin correction scale TID AC and HS and Lantus 16 units HS  - Fasting POCT 144 [Within protocol range] and pre-lunch POCT 202 [Above protocol range]  - Prior day POCT trended 151, 170, 157, 266: Patient has required Admelog correction 1/1/1/1 in addition to Lantus 16 units HS  - Will continue current management with Lantus 16 units HS and insulin correction scale  - Modify diet to consistent carbohydrate/no snacks due to evening elevation in POCT  - Titrate therapy to glycemic POCT target 100-180  - Patient is not a candidate for Tradjenta in-patient use: A1C > 7.5%  - On discharge: Refill needed for patients basal insulin [Was on insulin glargine-yfgn pen 100 unit/mL] and insulin pen needles

## 2024-03-15 NOTE — DIETITIAN INITIAL EVALUATION ADULT - PERTINENT MEDS FT
MEDICATIONS  (STANDING):  cefTRIAXone Injectable. 2000 milliGRAM(s) IV Push every 24 hours  cefTRIAXone Injectable.      dextrose 5%. 1000 milliLiter(s) (50 mL/Hr) IV Continuous <Continuous>  dextrose 5%. 1000 milliLiter(s) (50 mL/Hr) IV Continuous <Continuous>  dextrose 5%. 1000 milliLiter(s) (100 mL/Hr) IV Continuous <Continuous>  dextrose 50% Injectable 25 Gram(s) IV Push once  dextrose 50% Injectable 12.5 Gram(s) IV Push once  dextrose 50% Injectable 25 Gram(s) IV Push once  dextrose 50% Injectable 25 Gram(s) IV Push once  glucagon  Injectable 1 milliGRAM(s) IntraMuscular once  glucagon  Injectable 1 milliGRAM(s) IntraMuscular once  insulin glargine Injectable (LANTUS) 16 Unit(s) SubCutaneous at bedtime  insulin lispro (ADMELOG) corrective regimen sliding scale   SubCutaneous at bedtime  insulin lispro (ADMELOG) corrective regimen sliding scale   SubCutaneous three times a day before meals  multivitamin 1 Tablet(s) Oral daily  vancomycin  IVPB 1000 milliGRAM(s) IV Intermittent every 12 hours    MEDICATIONS  (PRN):  acetaminophen     Tablet .. 650 milliGRAM(s) Oral every 6 hours PRN Temp greater or equal to 38C (100.4F), Mild Pain (1 - 3)  aluminum hydroxide/magnesium hydroxide/simethicone Suspension 30 milliLiter(s) Oral every 4 hours PRN Dyspepsia  dextrose Oral Gel 15 Gram(s) Oral once PRN Blood Glucose LESS THAN 70 milliGRAM(s)/deciliter  dextrose Oral Gel 15 Gram(s) Oral once PRN Blood Glucose LESS THAN 70 milliGRAM(s)/deciliter  melatonin 3 milliGRAM(s) Oral at bedtime PRN Insomnia  ondansetron Injectable 4 milliGRAM(s) IV Push every 8 hours PRN Nausea and/or Vomiting

## 2024-03-15 NOTE — DISCHARGE NOTE PROVIDER - NSDCMRMEDTOKEN_GEN_ALL_CORE_FT
Insulin Pen Needles, 4mm: 1 application subcutaneously 4 times a day. ** Use with insulin pen **  lancets: 1 application subcutaneously 4 times a day  Multiple Vitamins oral tablet: 1 tab(s) orally once a day  Semglee (Prefilled Pen) 100 units/mL subcutaneous solution: 16 subcutaneous once a day (at bedtime)  test strips (per patient&#x27;s insurance): 1 application subcutaneously 4 times a day. ** Compatible with patient&#x27;s glucometer **   Multiple Vitamins oral tablet: 1 tab(s) orally once a day  Rocephin 2 g/50 mL intravenous solution: 2 gram(s) intravenously once a day  Semglee (Prefilled Pen) 100 units/mL subcutaneous solution: 16 subcutaneous once a day (at bedtime)  vancomycin 1 g/200 mL intravenous solution: 1 gram(s) intravenously 2 times a day

## 2024-03-15 NOTE — DIETITIAN INITIAL EVALUATION ADULT - ORAL INTAKE PTA/DIET HISTORY
Pt shops and cooks for self, lives alone.  Pt eats three meals a day, lunch is heaviest, they are all fair to light sized meals.   PO intake estimated < 75% ENN > one month

## 2024-03-15 NOTE — DISCHARGE NOTE PROVIDER - NSDCCPCAREPLAN_GEN_ALL_CORE_FT
PRINCIPAL DISCHARGE DIAGNOSIS  Diagnosis: Acute osteomyelitis  Assessment and Plan of Treatment:

## 2024-03-15 NOTE — ADVANCED PRACTICE NURSE CONSULT - ASSESSMENT
Patient received from 5S to Peds 142 awake, alert, oriented x 4, calm and cooperative. Benefits, risks and possible complications of procedure explained to patient verbalizes understanding. Gave verbal and written consent after all questions answered in detail. Hand hygiene and time out performed by team members. Patient verified using first and last name, , MRN and account number. Patient placed in semi-fowlers position. Site prepped with CHG. Draped in sterile fashion. Correct site confirmed, and lidocaine 1% 3cc subdermal injected to site prior to start of procedure. Using MST technique, single lumen PowerPICC SOLO Catheter with Sherlock 3CG 4F inserted via ultrasound guidance to right brachial vein, and cut to 39cm. Flushes well with 40cc of NS with brisk blood return present. Line secured to skin using statlock, site covered with biopatch and DSD applied. End cap placed. Sterile field maintained throughout procedure. Minimal blood loss. No complications. Chest xray ordered to confirm placement.    Lot # [id number]

## 2024-03-15 NOTE — DIETITIAN INITIAL EVALUATION ADULT - ADD RECOMMEND
Change diet to consistent carb  Suggest add Vit C 500 mg BID, add Zinc Sulfate 220 mg x 10 days to promote wound healing   Consider adding thiamine 100 mg daily 2/2 poor PO intake/ malnutrition  MVI w/ minerals daily to ensure 100% RDA met   Record PO intake in EMR after each meal (nursing.)   A target glucose range of 140-180 mg/dL is recommended for most critically ill and non-critically ill patients.   Monitor bowel movements, if no BM for >3 days, consider implementing bowel regimen.  Monitor PO intake, tolerance, labs and weight.

## 2024-03-15 NOTE — DIETITIAN INITIAL EVALUATION ADULT - NAME AND PHONE
Adela Malik RDN, CDN, [facility] [place]      [phone]   [email or web address]

## 2024-03-15 NOTE — PROGRESS NOTE ADULT - REASON FOR ADMISSION
non healing diabetic ulcer

## 2024-03-15 NOTE — DIETITIAN INITIAL EVALUATION ADULT - LITERATURE/VIDEOS GIVEN
Ready, Set, Start Counting          [email or web address]  Portion Your Plate         American Diabetes Association  Foot Care for Diabetes

## 2024-03-15 NOTE — DISCHARGE NOTE NURSING/CASE MANAGEMENT/SOCIAL WORK - PATIENT PORTAL LINK FT
You can access the FollowMyHealth Patient Portal offered by [facility] by registering at the following website: [email or web address]. By joining [facility]’s FollowMyHealth portal, you will also be able to view your health information using other applications (apps) compatible with our system.

## 2024-03-15 NOTE — PROGRESS NOTE ADULT - ASSESSMENT
64 y/o female h/o DM liam 2, HL, prior right great toe OM s/p amputation was admitted on 3/12 for a nonhealing 2nd toe ulcer. There is concern for second toe osteomyelitis. The patient denies fever or chills at home. Patient was sent in by Dr. Hernandez.    1. Right 2nd toe ulcer with probable underlying acute on chronic OM and toe cellulitis. Probable PVD.   -no clinical signs of sepsis  -BC x 2 noted  -on vancomycin 1 gm IV q12h and ceftriaxone 2 gm IV qd # 2  -tolerating abx well so far; no side effects noted  -obtain vancomycin trough level   -podiatry evaluation appreciated  -local wound care  -continue abx coverage   -monitor temps  -f/u CBC  -supportive care  2. Other issues:   -care per medicine    Clinical team may change from intravenous to oral antibiotics when the following criteria are met:   1. Patient is clinically improving/stable       a)	Improved signs and symptoms of infection from initial presentation       b)	Afebrile for 24 hours       c)	Leukocytosis trending towards normal range   2. Patient is tolerating oral intake   3. Initial/repeat blood cultures are negative     When above criteria met may change iv antibiotics to an appropriate oral agent or she may need longer course of IV abx therapy  Cannot advise changing to oral antibiotic therapy until culture sensitivity is available.    
Alert afebrile Right foot with bulbous tuft of 2nd toe & stage II ulcer. No drainage. Xrays R foot reveal soft tissue swelling of 2nd toe and osteopenic distal phalanx suspicious for osteomyelitis. Await MRI ESR 11 Uric Ac 4.3 Will F/U THX
64 y/o female h/o DM liam 2, HL, prior right great toe OM s/p amputation was admitted on 3/12 for a nonhealing 2nd toe ulcer. There is concern for second toe osteomyelitis. The patient denies fever or chills at home. Patient was sent in by Dr. Hernandez.    1. Right 2nd toe ulcer with probable underlying acute on chronic OM and toe cellulitis. Probable PVD.   -no clinical signs of sepsis  -BC x 2 noted  -on vancomycin 1 gm IV q12h and ceftriaxone 2 gm IV qd # 3  -tolerating abx well so far; no side effects noted  -vancomycin trough level is therapeutic  -podiatry evaluation appreciated  -local wound care  -continue abx coverage for 6 weeks  -PICC Line  -old chart reviewed to assess prior cultures  -home IV abx setup in progress; case reviewed with case management; orders reviewed and called in to pharmacist with infusion company; awaiting insurance approval  -weekly labs  -f/u with podiatry as outpatient  -monitor temps  -f/u CBC  -supportive care  2. Other issues:   -care per medicine      
Alert afebrile Right foot looks improved. No erythema to 2nd toe, scant edema acral skin defect with eschar no drainage, pus, odor. No wound over 1st ray amputation site WBC 4.40 ESR 11. MRI suspicious for osteomyelitis of the right 2nd toe distal & middle phalanges & stump of 1st metatarsal resection (reactive v osteomyelitis). Discussed tx options with patient including debride 1st metatarsal & amputation of R 2nd toe V. PICC placement & LT ABX. She is refusing surgery at this time & is requesting PICC placement. She is aware that IV ABX alone may not supress the infective process to her right foot & even with treatment may require surgical debridement and/or amputation. Await PICC placement and D/C home. THX
Continue ABX Await MRI R foot IC Consult  Will F/U THX
63F DM2, hld, PAD h/o non healing ulcer of right toe s/p amp now presents for presents to the office yesterday with a red swollen draining Right 2nd toe concern for underlying OM in the same non healing ulcer. Denies fevers. Labs wnl. VSS.   MRI pending  ED course: vanco/ceftriaxone      #Diabetic non healing ulcer  #R/O underlying osteomyelitis  - MRI pending  - pain control  - wound care per podiatry  - EKG: NSR, no dynamic changes  - RCRI: Class II risk / 6% 30 day risk of death, mi or cardiac arrest  - no absolute CI to proceed with surgery if indicated  - stands at low risk for low/mod risk procedure  - ID: Vanco/ceftriaxone  - blood cultures: NGTD  - tight glucose control  - A1C: 9.5  - pt states she self d/c'd semglee and has not followed up with pcp (was on 16 u qhs)  - will need Rx upon dc  - resume all home meds  - DVT px: HSQ

## 2024-03-16 ENCOUNTER — TRANSCRIPTION ENCOUNTER (OUTPATIENT)
Age: 64
End: 2024-03-16

## 2024-03-18 ENCOUNTER — NON-APPOINTMENT (OUTPATIENT)
Age: 64
End: 2024-03-18

## 2024-03-18 ENCOUNTER — TRANSCRIPTION ENCOUNTER (OUTPATIENT)
Age: 64
End: 2024-03-18

## 2024-03-18 RX ORDER — CEFTRIAXONE 2 G/1
2 INJECTION, POWDER, FOR SOLUTION INTRAMUSCULAR; INTRAVENOUS
Refills: 0 | Status: ACTIVE | COMMUNITY
Start: 2024-03-18

## 2024-03-18 RX ORDER — VANCOMYCIN HYDROCHLORIDE 1 G/20ML
1 INJECTION, POWDER, LYOPHILIZED, FOR SOLUTION INTRAVENOUS
Refills: 0 | Status: ACTIVE | COMMUNITY
Start: 2024-03-18

## 2024-03-26 DIAGNOSIS — E11.621 TYPE 2 DIABETES MELLITUS WITH FOOT ULCER: ICD-10-CM

## 2024-03-26 DIAGNOSIS — Z79.84 LONG TERM (CURRENT) USE OF ORAL HYPOGLYCEMIC DRUGS: ICD-10-CM

## 2024-03-26 DIAGNOSIS — L97.514 NON-PRESSURE CHRONIC ULCER OF OTHER PART OF RIGHT FOOT WITH NECROSIS OF BONE: ICD-10-CM

## 2024-03-26 DIAGNOSIS — E11.69 TYPE 2 DIABETES MELLITUS WITH OTHER SPECIFIED COMPLICATION: ICD-10-CM

## 2024-03-26 DIAGNOSIS — L03.031 CELLULITIS OF RIGHT TOE: ICD-10-CM

## 2024-03-26 DIAGNOSIS — Z89.421 ACQUIRED ABSENCE OF OTHER RIGHT TOE(S): ICD-10-CM

## 2024-03-26 DIAGNOSIS — M86.171 OTHER ACUTE OSTEOMYELITIS, RIGHT ANKLE AND FOOT: ICD-10-CM

## 2024-03-26 DIAGNOSIS — Z79.4 LONG TERM (CURRENT) USE OF INSULIN: ICD-10-CM

## 2024-03-26 DIAGNOSIS — E43 UNSPECIFIED SEVERE PROTEIN-CALORIE MALNUTRITION: ICD-10-CM

## 2024-03-27 ENCOUNTER — TRANSCRIPTION ENCOUNTER (OUTPATIENT)
Age: 64
End: 2024-03-27

## 2024-04-02 ENCOUNTER — APPOINTMENT (OUTPATIENT)
Dept: INTERNAL MEDICINE | Facility: CLINIC | Age: 64
End: 2024-04-02
Payer: COMMERCIAL

## 2024-04-02 VITALS
WEIGHT: 162 LBS | BODY MASS INDEX: 21.94 KG/M2 | SYSTOLIC BLOOD PRESSURE: 121 MMHG | HEART RATE: 78 BPM | TEMPERATURE: 97.7 F | DIASTOLIC BLOOD PRESSURE: 75 MMHG | HEIGHT: 72 IN | RESPIRATION RATE: 16 BRPM | OXYGEN SATURATION: 97 %

## 2024-04-02 DIAGNOSIS — Z09 ENCOUNTER FOR FOLLOW-UP EXAMINATION AFTER COMPLETED TREATMENT FOR CONDITIONS OTHER THAN MALIGNANT NEOPLASM: ICD-10-CM

## 2024-04-02 DIAGNOSIS — I73.9 PERIPHERAL VASCULAR DISEASE, UNSPECIFIED: ICD-10-CM

## 2024-04-02 DIAGNOSIS — E78.5 HYPERLIPIDEMIA, UNSPECIFIED: ICD-10-CM

## 2024-04-02 DIAGNOSIS — M86.9 OSTEOMYELITIS, UNSPECIFIED: ICD-10-CM

## 2024-04-02 PROCEDURE — 99495 TRANSJ CARE MGMT MOD F2F 14D: CPT

## 2024-04-03 NOTE — HISTORY OF PRESENT ILLNESS
[Post-hospitalization from ___ Hospital] : Post-hospitalization from [unfilled] Hospital [Admitted on: ___] : The patient was admitted on [unfilled] [Discharged on ___] : discharged on [unfilled] [Discharge Summary] : discharge summary [Discharge Med List] : discharge medication list [Patient Contacted By: ____] : and contacted by [unfilled] [FreeTextEntry2] : Ms. PINA with pmhx of  DM2, hld, PAD. she was referred to the ER for  with a red swollen draining Right 2nd toe, she had amputation of the right big toe recently. Concern was underlying OM , she was referred to surgery. pt, prefer to have antibiotic treatment , she has a PICC line  and was started on  vanco/ceftriaxone x 6 weeks. Her last dose of antibiotic will be on 04/24, she see ID every wk, she stated it is healing. type 2 dm she is on semglee 16u qhs  scheduled to see endo next wk at [facility]. she is aslo scheduled to see cardiology , currently off of atorvastatin.

## 2024-04-03 NOTE — ASSESSMENT
[FreeTextEntry1] :  pt. presents for f/u after discharged from hospital. She was admitted with OM of rt. 2 nd toe. she has a PICC line  and on  vanco/ceftriaxone x 6 weeks. Her last dose of antibiotic will be on 04/24, she sees ID every wk.  #type 2 dm she is on semglee 16u qhs  scheduled to see endo next wk at [facility]. she will continue current treatment  home monitoring of blood sugar as recommended. symptoms of low blood sugar discussed. Adherence to ADA diet, portion control. Educated on foot care( choose comfortable shocks and shoes, trim nails careful, wash and check your feet daily).  PAD, Hyperlipidemia : She was on atorvasattin 10 mg.  no stopped taking. pt. stated she is seeing cardiology , she will discuss about it. last , advised pt. her goal is below 70 , she should start on treatment. Advise low-fat diet and exercise.  f/u in 3 months.

## 2024-04-03 NOTE — PHYSICAL EXAM
[Normal] : normal gait, coordination grossly intact, no focal deficits and deep tendon reflexes were 2+ and symmetric [13879 - Moderate Complexity requires multiple possible diagnoses and/or the management options, moderate complexity of the medical data (tests, etc.) to be reviewed, and moderate risk of significant complications, morbidity, and/or mortality as well as co] : Moderate Complexity [de-identified] : amputation of right big toe.

## 2024-04-04 ENCOUNTER — TRANSCRIPTION ENCOUNTER (OUTPATIENT)
Age: 64
End: 2024-04-04

## 2024-04-05 ENCOUNTER — OUTPATIENT (OUTPATIENT)
Dept: OUTPATIENT SERVICES | Facility: HOSPITAL | Age: 64
LOS: 1 days | End: 2024-04-05
Payer: COMMERCIAL

## 2024-04-05 ENCOUNTER — RESULT REVIEW (OUTPATIENT)
Age: 64
End: 2024-04-05

## 2024-04-05 ENCOUNTER — APPOINTMENT (OUTPATIENT)
Dept: RADIOLOGY | Facility: CLINIC | Age: 64
End: 2024-04-05
Payer: COMMERCIAL

## 2024-04-05 ENCOUNTER — APPOINTMENT (OUTPATIENT)
Dept: MAMMOGRAPHY | Facility: CLINIC | Age: 64
End: 2024-04-05
Payer: COMMERCIAL

## 2024-04-05 DIAGNOSIS — Z13.820 ENCOUNTER FOR SCREENING FOR OSTEOPOROSIS: ICD-10-CM

## 2024-04-05 DIAGNOSIS — Z12.39 ENCOUNTER FOR OTHER SCREENING FOR MALIGNANT NEOPLASM OF BREAST: ICD-10-CM

## 2024-04-05 PROCEDURE — 77063 BREAST TOMOSYNTHESIS BI: CPT | Mod: 26

## 2024-04-05 PROCEDURE — 77085 DXA BONE DENSITY AXL VRT FX: CPT

## 2024-04-05 PROCEDURE — 77063 BREAST TOMOSYNTHESIS BI: CPT

## 2024-04-05 PROCEDURE — 77085 DXA BONE DENSITY AXL VRT FX: CPT | Mod: 26

## 2024-04-05 PROCEDURE — 77067 SCR MAMMO BI INCL CAD: CPT

## 2024-04-05 PROCEDURE — 77067 SCR MAMMO BI INCL CAD: CPT | Mod: 26

## 2024-04-09 ENCOUNTER — APPOINTMENT (OUTPATIENT)
Dept: ORTHOPEDIC SURGERY | Facility: CLINIC | Age: 64
End: 2024-04-09
Payer: COMMERCIAL

## 2024-04-09 VITALS — BODY MASS INDEX: 21.67 KG/M2 | WEIGHT: 160 LBS | HEIGHT: 72 IN

## 2024-04-09 PROCEDURE — 99203 OFFICE O/P NEW LOW 30 MIN: CPT

## 2024-04-09 NOTE — HISTORY OF PRESENT ILLNESS
[de-identified] : 63F presents with left thumb pain and index finger pain since the end beginning of April 2024. Patient reports experiencing aching pain and having trouble closing fingers. No prior treatment/ Imaging.

## 2024-04-09 NOTE — ASSESSMENT
[FreeTextEntry1] : Left proximal median nerve compression - reviewed pathoanatomy. Encouraged nighttime cockup wrist bracing. Will obtain LUE EMG/NCV in light of severity of symptoms - patient will follow-up thereafter to discuss results and develop plan-of-care.  F/u after EMG/NCV (if normal, xray wrist and obtain MRI)

## 2024-04-09 NOTE — IMAGING
[de-identified] : Left wrist with no swelling nor erythema. Able to make fist, oppose thumb to small finger and abduct fingers, no overt atrophy. -Phalen's, -tinel at carpal, -tinel at [place], -tinel at cubital. -froment, -wartenberg. Decreased sensation in median and intact at superficial radial and intact in small and ulnar ring finger(normal at ulnar hand) prior to provocative testing. <2sec cap refill. +ttp at proximal forearm volarly. Inability to flex IP at thumb and DIP at index.  Inconclusive tenodesis effect.

## 2024-04-11 ENCOUNTER — TRANSCRIPTION ENCOUNTER (OUTPATIENT)
Age: 64
End: 2024-04-11

## 2024-04-16 ENCOUNTER — APPOINTMENT (OUTPATIENT)
Dept: NEUROLOGY | Facility: CLINIC | Age: 64
End: 2024-04-16
Payer: COMMERCIAL

## 2024-04-16 PROCEDURE — 95886 MUSC TEST DONE W/N TEST COMP: CPT

## 2024-04-16 PROCEDURE — 95912 NRV CNDJ TEST 11-12 STUDIES: CPT

## 2024-04-23 ENCOUNTER — APPOINTMENT (OUTPATIENT)
Dept: ORTHOPEDIC SURGERY | Facility: CLINIC | Age: 64
End: 2024-04-23
Payer: COMMERCIAL

## 2024-04-23 VITALS — WEIGHT: 161 LBS | BODY MASS INDEX: 21.81 KG/M2 | HEIGHT: 72 IN

## 2024-04-23 PROCEDURE — 99214 OFFICE O/P EST MOD 30 MIN: CPT | Mod: 57

## 2024-04-26 NOTE — HISTORY OF PRESENT ILLNESS
[de-identified] : 4/23/24: Follow up on left thumb pain and index finger pain. Patient is here to review EMG results.   63F presents with left thumb pain and index finger pain since the end beginning of April 2024. Patient reports experiencing aching pain and having trouble closing fingers. No prior treatment/ Imaging.

## 2024-04-26 NOTE — IMAGING
[de-identified] : Left wrist with no swelling nor erythema. Able to make fist, oppose thumb to small finger and abduct fingers, no overt atrophy. -Phalen's, -tinel at carpal, -tinel at [place], -tinel at cubital. -froment, -wartenberg. Decreased sensation in median and intact at superficial radial and intact in small and ulnar ring finger(normal at ulnar hand) prior to provocative testing. <2sec cap refill. +ttp at proximal forearm volarly. Inability to flex IP at thumb and DIP at index.  Inconclusive tenodesis effect.

## 2024-04-26 NOTE — ASSESSMENT
[FreeTextEntry1] : Left CTS and left AIN neuropathy - reviewed bilateral EMG/NCV results of left mod-severe CTS and left moderate AIN neuropathy. Given patient's severity continued symptoms despite nonoperative management, patient indicated for decompression. We discussed the risks, benefits and alternatives to carpal tunnel release including risk of neurovascular injury, wound dehiscence/infection, continued numbness, continued weakness, need for reoperation, DVT and medical complications associated with anesthesia. Discussed that decompression halts progression, but that improvement with existing sensory or motor deficits are not guaranteed to return. Patient understood this conversation and all questions were answered. She elected to proceed.  Plan for left endoscopic carpal tunnel release and left anterior interosseous nerve decompression at proximal forearm under local with sedation. [facility].  F/u for surgery, 10 days thereafter

## 2024-06-03 ENCOUNTER — APPOINTMENT (OUTPATIENT)
Dept: INTERNAL MEDICINE | Facility: CLINIC | Age: 64
End: 2024-06-03
Payer: COMMERCIAL

## 2024-06-03 VITALS — SYSTOLIC BLOOD PRESSURE: 132 MMHG | DIASTOLIC BLOOD PRESSURE: 74 MMHG

## 2024-06-03 VITALS
OXYGEN SATURATION: 98 % | HEART RATE: 79 BPM | SYSTOLIC BLOOD PRESSURE: 150 MMHG | BODY MASS INDEX: 22.48 KG/M2 | RESPIRATION RATE: 16 BRPM | TEMPERATURE: 97.8 F | DIASTOLIC BLOOD PRESSURE: 84 MMHG | HEIGHT: 72 IN | WEIGHT: 166 LBS

## 2024-06-03 DIAGNOSIS — Z79.4 TYPE 2 DIABETES MELLITUS W/OUT COMPLICATIONS: ICD-10-CM

## 2024-06-03 DIAGNOSIS — E11.9 TYPE 2 DIABETES MELLITUS W/OUT COMPLICATIONS: ICD-10-CM

## 2024-06-03 DIAGNOSIS — Z01.818 ENCOUNTER FOR OTHER PREPROCEDURAL EXAMINATION: ICD-10-CM

## 2024-06-03 PROCEDURE — G2211 COMPLEX E/M VISIT ADD ON: CPT

## 2024-06-03 PROCEDURE — 99214 OFFICE O/P EST MOD 30 MIN: CPT

## 2024-06-03 PROCEDURE — 36415 COLL VENOUS BLD VENIPUNCTURE: CPT

## 2024-06-03 RX ORDER — INSULIN GLARGINE-YFGN 100 [IU]/ML
100 INJECTION, SOLUTION SUBCUTANEOUS
Qty: 3 | Refills: 1 | Status: ACTIVE | COMMUNITY
Start: 2023-12-15 | End: 1900-01-01

## 2024-06-03 RX ORDER — ATORVASTATIN CALCIUM 10 MG/1
10 TABLET, FILM COATED ORAL
Qty: 90 | Refills: 1 | Status: ACTIVE | COMMUNITY
Start: 2023-12-08 | End: 1900-01-01

## 2024-06-03 RX ORDER — PEN NEEDLE, DIABETIC 29 G X1/2"
32G X 4 MM NEEDLE, DISPOSABLE MISCELLANEOUS
Qty: 200 | Refills: 5 | Status: ACTIVE | COMMUNITY
Start: 2023-12-15 | End: 1900-01-01

## 2024-06-05 LAB
ANION GAP SERPL CALC-SCNC: 12 MMOL/L
BASOPHILS # BLD AUTO: 0.08 K/UL
BASOPHILS NFR BLD AUTO: 1.1 %
BUN SERPL-MCNC: 21 MG/DL
CALCIUM SERPL-MCNC: 9.7 MG/DL
CHLORIDE SERPL-SCNC: 105 MMOL/L
CO2 SERPL-SCNC: 24 MMOL/L
CREAT SERPL-MCNC: 1.2 MG/DL
EGFR: 50 ML/MIN/1.73M2
EOSINOPHIL # BLD AUTO: 0.14 K/UL
EOSINOPHIL NFR BLD AUTO: 2 %
ESTIMATED AVERAGE GLUCOSE: 197 MG/DL
GLUCOSE SERPL-MCNC: 177 MG/DL
HBA1C MFR BLD HPLC: 8.5 %
HCT VFR BLD CALC: 38.8 %
HGB BLD-MCNC: 12.9 G/DL
IMM GRANULOCYTES NFR BLD AUTO: 0.3 %
LYMPHOCYTES # BLD AUTO: 2.01 K/UL
LYMPHOCYTES NFR BLD AUTO: 28.6 %
MAN DIFF?: NORMAL
MCHC RBC-ENTMCNC: 29.3 PG
MCHC RBC-ENTMCNC: 33.2 GM/DL
MCV RBC AUTO: 88 FL
MONOCYTES # BLD AUTO: 0.47 K/UL
MONOCYTES NFR BLD AUTO: 6.7 %
NEUTROPHILS # BLD AUTO: 4.31 K/UL
NEUTROPHILS NFR BLD AUTO: 61.3 %
PLATELET # BLD AUTO: 220 K/UL
POTASSIUM SERPL-SCNC: 4.3 MMOL/L
RBC # BLD: 4.41 M/UL
RBC # FLD: 13.5 %
SODIUM SERPL-SCNC: 141 MMOL/L
WBC # FLD AUTO: 7.03 K/UL

## 2024-06-05 NOTE — HISTORY OF PRESENT ILLNESS
[No Pertinent Cardiac History] : no history of aortic stenosis, atrial fibrillation, coronary artery disease, recent myocardial infarction, or implantable device/pacemaker [No Pertinent Pulmonary History] : no history of asthma, COPD, sleep apnea, or smoking [No Adverse Anesthesia Reaction] : no adverse anesthesia reaction in self or family member [Chronic Anticoagulation] : no chronic anticoagulation [Chronic Kidney Disease] : no chronic kidney disease [Diabetes] : diabetes [(Patient denies any chest pain, claudication, dyspnea on exertion, orthopnea, palpitations or syncope)] : Patient denies any chest pain, claudication, dyspnea on exertion, orthopnea, palpitations or syncope [Moderate (4-6 METs)] : Moderate (4-6 METs) [FreeTextEntry1] : Left hand carpal tunnel surgery [FreeTextEntry2] :  6/6/2024 [FreeTextEntry3] : Dr Serena Lincoln. [FreeTextEntry4] : Ms. HUIZARCHARISSEresents today for a preop evaluation. Pt. is over all feeling well. she denies chest pain, sob, palpitations. she denies easy bruising or bleeding No hx of surgical or anesthesia complications

## 2024-06-05 NOTE — ASSESSMENT
[Patient Optimized for Surgery] : Patient optimized for surgery [No Further Testing Recommended] : no further testing recommended [FreeTextEntry4] : I reviewed pre-op labs. CBC,BMP, a1c. Labs revealed elevation of  glucose 177 patient was not fasting. Type 2 diabetes she is currently on Semglee 16 unit at bedtime.  She claims compliance. A1c 8.5 came down from 9.4.  Advised patient to increase insulin to 25 unit.  Also low-carb diet avoid sugar and exercise. HLD: Was prescribed atorvastatin 10 mg patient had not started taking medication  I recommended patient to start on treatment with low-fat diet pt. is currently medically stable for the planned procedure. Patient will avoid taking multivitamins and NSAIDs 5 days prior to the procedure.

## 2024-06-06 ENCOUNTER — APPOINTMENT (OUTPATIENT)
Dept: ORTHOPEDIC SURGERY | Facility: AMBULATORY SURGERY CENTER | Age: 64
End: 2024-06-06
Payer: COMMERCIAL

## 2024-06-06 PROCEDURE — 64708 REVISE ARM/LEG NERVE: CPT | Mod: 59,LT

## 2024-06-06 PROCEDURE — 29848 WRIST ENDOSCOPY/SURGERY: CPT | Mod: LT

## 2024-06-17 ENCOUNTER — APPOINTMENT (OUTPATIENT)
Dept: ORTHOPEDIC SURGERY | Facility: CLINIC | Age: 64
End: 2024-06-17
Payer: COMMERCIAL

## 2024-06-17 VITALS — HEIGHT: 72 IN | WEIGHT: 166 LBS | BODY MASS INDEX: 22.48 KG/M2

## 2024-06-17 DIAGNOSIS — R29.898 OTHER SYMPTOMS AND SIGNS INVOLVING THE MUSCULOSKELETAL SYSTEM: ICD-10-CM

## 2024-06-17 PROCEDURE — 99024 POSTOP FOLLOW-UP VISIT: CPT

## 2024-06-21 PROBLEM — R29.898 HAND WEAKNESS: Status: ACTIVE | Noted: 2024-04-09

## 2024-06-21 NOTE — HISTORY OF PRESENT ILLNESS
[de-identified] : 4/23/24: Follow up on left thumb pain and index finger pain. Patient is here to review EMG results.   63F presents with left thumb pain and index finger pain since the end beginning of April 2024. Patient reports experiencing aching pain and having trouble closing fingers. No prior treatment/ Imaging.   06/17/24: s/p left endoscopic CTR, left proximal forearm median nerve decompression with release [06/06/24]. Patient reports that her left wrist is doing well but states her left index finger is stiff, along with a "fuzzy feeling" in the finger. Denies fevers, chills, SOB.

## 2024-06-21 NOTE — IMAGING
[de-identified] : Left wrist with no swelling nor erythema. Able to make fist, oppose thumb to small finger and abduct fingers, no overt atrophy. Incision well healed, no erythema nor drainage. Decreased sensation in median and intact at superficial radial and intact in small and ulnar ring finger(normal at ulnar hand) prior to provocative testing. <2sec cap refill. +ttp at proximal forearm volarly. Inability to flex IP at thumb and DIP at index.  Inconclusive tenodesis effect.

## 2024-06-21 NOTE — ASSESSMENT
[FreeTextEntry1] : s/p left endoscopic CTR, left proximal forearm median nerve decompression with release [06/06/24]. - progressing well postop. WBAT. Suture ends removed, pateint tolerated well.  F/u 6 weeks

## 2024-07-29 ENCOUNTER — APPOINTMENT (OUTPATIENT)
Dept: ORTHOPEDIC SURGERY | Facility: CLINIC | Age: 64
End: 2024-07-29
Payer: COMMERCIAL

## 2024-07-29 DIAGNOSIS — R29.898 OTHER SYMPTOMS AND SIGNS INVOLVING THE MUSCULOSKELETAL SYSTEM: ICD-10-CM

## 2024-07-29 PROCEDURE — 99024 POSTOP FOLLOW-UP VISIT: CPT

## 2024-07-29 NOTE — ASSESSMENT
[FreeTextEntry1] : s/p left endoscopic CTR, left proximal forearm median nerve decompression with release [06/06/24] - WBAT. OT for ROM.  F/u 6 weeks

## 2024-07-29 NOTE — IMAGING
[de-identified] : Left wrist with no swelling nor erythema. Able to make fist, oppose thumb to small finger and abduct fingers, no overt atrophy. Incision well healed, no erythema nor drainage. Decreased sensation in median and intact at superficial radial and intact in small and ulnar ring finger(normal at ulnar hand) prior to provocative testing. <2sec cap refill. +ttp at proximal forearm volarly. Inability to flex IP at thumb and DIP at index.

## 2024-07-29 NOTE — HISTORY OF PRESENT ILLNESS
[de-identified] : 4/23/24: Follow up on left thumb pain and index finger pain. Patient is here to review EMG results.   63F presents with left thumb pain and index finger pain since the end beginning of April 2024. Patient reports experiencing aching pain and having trouble closing fingers. No prior treatment/ Imaging.   06/17/24: s/p left endoscopic CTR, left proximal forearm median nerve decompression with release [06/06/24]. Patient reports that her left wrist is doing well but states her left index finger is stiff, along with a "fuzzy feeling" in the finger. Denies fevers, chills, SOB.   7/29/24: s/p left endoscopic CTR, left proximal forearm median nerve decompression with release [06/06/24].  Patient reports that she continues to lack any flexion in the thumb and index finger. Patient reports that flexion has improved somewhat from previous visit. Patient reports she struggles with daily activities such as opening a water bottle.

## 2024-09-11 ENCOUNTER — APPOINTMENT (OUTPATIENT)
Dept: INTERNAL MEDICINE | Facility: CLINIC | Age: 64
End: 2024-09-11
Payer: COMMERCIAL

## 2024-09-11 VITALS
DIASTOLIC BLOOD PRESSURE: 76 MMHG | OXYGEN SATURATION: 97 % | WEIGHT: 182 LBS | RESPIRATION RATE: 16 BRPM | TEMPERATURE: 97.8 F | HEART RATE: 64 BPM | BODY MASS INDEX: 24.65 KG/M2 | SYSTOLIC BLOOD PRESSURE: 147 MMHG | HEIGHT: 72 IN

## 2024-09-11 DIAGNOSIS — Z23 ENCOUNTER FOR IMMUNIZATION: ICD-10-CM

## 2024-09-11 DIAGNOSIS — E11.9 TYPE 2 DIABETES MELLITUS W/OUT COMPLICATIONS: ICD-10-CM

## 2024-09-11 DIAGNOSIS — E78.5 HYPERLIPIDEMIA, UNSPECIFIED: ICD-10-CM

## 2024-09-11 DIAGNOSIS — M86.9 OSTEOMYELITIS, UNSPECIFIED: ICD-10-CM

## 2024-09-11 DIAGNOSIS — Z79.4 TYPE 2 DIABETES MELLITUS W/OUT COMPLICATIONS: ICD-10-CM

## 2024-09-11 DIAGNOSIS — I73.9 PERIPHERAL VASCULAR DISEASE, UNSPECIFIED: ICD-10-CM

## 2024-09-11 PROCEDURE — 90471 IMMUNIZATION ADMIN: CPT

## 2024-09-11 PROCEDURE — G2211 COMPLEX E/M VISIT ADD ON: CPT | Mod: NC

## 2024-09-11 PROCEDURE — 99214 OFFICE O/P EST MOD 30 MIN: CPT | Mod: 25

## 2024-09-11 PROCEDURE — 36415 COLL VENOUS BLD VENIPUNCTURE: CPT

## 2024-09-11 PROCEDURE — 90750 HZV VACC RECOMBINANT IM: CPT

## 2024-09-11 NOTE — ASSESSMENT
[FreeTextEntry1] : Osteomyelitis of second toe of right foot  follows with podiatry.  #type 2 dm : she is on semglee 16u qhs  has not seen endo. last a1c was 8.5 , ordered labs today.. she will continue current treatment, renewed prescription home monitoring of blood sugar as recommended. symptoms of low blood sugar discussed. Adherence to ADA diet, portion control. Educated on foot care( choose comfortable shocks and shoes, trim nails careful, wash and check your feet daily). referred to diabetic educator for diet and insulin teach. seen eye doctor.  PAD, Hyperlipidemia : She was on atorvasattin 10 mg. claims compliance. sees cardiology  last , advised pt. her goal is below 70 , she should start on treatment. Advise low-fat diet and exercise.  shingles vaccine 1 st dose given today. f/u in 3 months.

## 2024-09-11 NOTE — HISTORY OF PRESENT ILLNESS
[FreeTextEntry1] : Patient presents for follow-up on type 2 diabetes [de-identified] : Ms. PINA is here today for a f/u visit. pt. stated her insulin pen was not working for 2 days She have not seen endocrinologist yet. Follows with podiatry for osteomyelitis of the right foot. s/p left endoscopic CTR, left proximal forearm median nerve decompression with release Overall doing well.  She is still not good in her diet.

## 2024-09-13 LAB
ALBUMIN SERPL ELPH-MCNC: 4.1 G/DL
ALP BLD-CCNC: 94 U/L
ALT SERPL-CCNC: 50 U/L
ANION GAP SERPL CALC-SCNC: 9 MMOL/L
AST SERPL-CCNC: 32 U/L
BILIRUB SERPL-MCNC: 0.7 MG/DL
BUN SERPL-MCNC: 16 MG/DL
CALCIUM SERPL-MCNC: 9.7 MG/DL
CHLORIDE SERPL-SCNC: 105 MMOL/L
CO2 SERPL-SCNC: 28 MMOL/L
CREAT SERPL-MCNC: 0.92 MG/DL
EGFR: 69 ML/MIN/1.73M2
ESTIMATED AVERAGE GLUCOSE: 189 MG/DL
GLUCOSE SERPL-MCNC: 126 MG/DL
HBA1C MFR BLD HPLC: 8.2 %
POTASSIUM SERPL-SCNC: 4.7 MMOL/L
PROT SERPL-MCNC: 6.5 G/DL
SODIUM SERPL-SCNC: 142 MMOL/L

## 2024-09-17 ENCOUNTER — APPOINTMENT (OUTPATIENT)
Dept: ENDOCRINOLOGY | Facility: CLINIC | Age: 64
End: 2024-09-17

## 2024-09-17 ENCOUNTER — NON-APPOINTMENT (OUTPATIENT)
Age: 64
End: 2024-09-17

## 2024-10-07 ENCOUNTER — APPOINTMENT (OUTPATIENT)
Dept: INTERNAL MEDICINE | Facility: CLINIC | Age: 64
End: 2024-10-07
Payer: COMMERCIAL

## 2024-10-07 VITALS
HEART RATE: 75 BPM | OXYGEN SATURATION: 97 % | RESPIRATION RATE: 15 BRPM | DIASTOLIC BLOOD PRESSURE: 77 MMHG | SYSTOLIC BLOOD PRESSURE: 146 MMHG | TEMPERATURE: 98.3 F

## 2024-10-07 DIAGNOSIS — Z79.4 TYPE 2 DIABETES MELLITUS W/OUT COMPLICATIONS: ICD-10-CM

## 2024-10-07 DIAGNOSIS — I10 ESSENTIAL (PRIMARY) HYPERTENSION: ICD-10-CM

## 2024-10-07 DIAGNOSIS — I73.9 PERIPHERAL VASCULAR DISEASE, UNSPECIFIED: ICD-10-CM

## 2024-10-07 DIAGNOSIS — E11.9 TYPE 2 DIABETES MELLITUS W/OUT COMPLICATIONS: ICD-10-CM

## 2024-10-07 PROCEDURE — 99214 OFFICE O/P EST MOD 30 MIN: CPT

## 2024-10-07 PROCEDURE — G2211 COMPLEX E/M VISIT ADD ON: CPT

## 2024-10-07 RX ORDER — LISINOPRIL 2.5 MG/1
2.5 TABLET ORAL
Qty: 90 | Refills: 0 | Status: ACTIVE | COMMUNITY
Start: 2024-10-07 | End: 1900-01-01

## 2024-10-11 ENCOUNTER — TRANSCRIPTION ENCOUNTER (OUTPATIENT)
Age: 64
End: 2024-10-11

## 2024-11-12 NOTE — DISCHARGE NOTE PROVIDER - DETAILS OF MALNUTRITION DIAGNOSIS/DIAGNOSES
This patient has been assessed with a concern for Malnutrition and was treated during this hospitalization for the following Nutrition diagnosis/diagnoses:     -  11/02/2023: Moderate protein-calorie malnutrition  
DISPLAY PLAN FREE TEXT

## 2024-12-26 ENCOUNTER — RX RENEWAL (OUTPATIENT)
Age: 64
End: 2024-12-26

## 2025-01-15 ENCOUNTER — APPOINTMENT (OUTPATIENT)
Dept: INTERNAL MEDICINE | Facility: CLINIC | Age: 65
End: 2025-01-15

## 2025-01-15 VITALS
DIASTOLIC BLOOD PRESSURE: 74 MMHG | HEART RATE: 63 BPM | HEIGHT: 72 IN | RESPIRATION RATE: 15 BRPM | WEIGHT: 185 LBS | SYSTOLIC BLOOD PRESSURE: 138 MMHG | OXYGEN SATURATION: 98 % | BODY MASS INDEX: 25.06 KG/M2 | TEMPERATURE: 98 F

## 2025-01-15 DIAGNOSIS — I10 ESSENTIAL (PRIMARY) HYPERTENSION: ICD-10-CM

## 2025-01-15 DIAGNOSIS — Z79.4 TYPE 2 DIABETES MELLITUS W/OUT COMPLICATIONS: ICD-10-CM

## 2025-01-15 DIAGNOSIS — R74.8 ABNORMAL LEVELS OF OTHER SERUM ENZYMES: ICD-10-CM

## 2025-01-15 DIAGNOSIS — E78.5 HYPERLIPIDEMIA, UNSPECIFIED: ICD-10-CM

## 2025-01-15 DIAGNOSIS — Z12.11 ENCOUNTER FOR SCREENING FOR MALIGNANT NEOPLASM OF COLON: ICD-10-CM

## 2025-01-15 DIAGNOSIS — E11.9 TYPE 2 DIABETES MELLITUS W/OUT COMPLICATIONS: ICD-10-CM

## 2025-01-15 PROCEDURE — 99204 OFFICE O/P NEW MOD 45 MIN: CPT

## 2025-01-15 PROCEDURE — G2211 COMPLEX E/M VISIT ADD ON: CPT

## 2025-01-22 LAB
ALBUMIN SERPL ELPH-MCNC: 4.1 G/DL
ALP BLD-CCNC: 80 U/L
ALT SERPL-CCNC: 29 U/L
ANION GAP SERPL CALC-SCNC: 13 MMOL/L
AST SERPL-CCNC: 24 U/L
BILIRUB SERPL-MCNC: 0.5 MG/DL
BUN SERPL-MCNC: 21 MG/DL
CALCIUM SERPL-MCNC: 9.5 MG/DL
CHLORIDE SERPL-SCNC: 106 MMOL/L
CO2 SERPL-SCNC: 26 MMOL/L
CREAT SERPL-MCNC: 0.9 MG/DL
EGFR: 71 ML/MIN/1.73M2
ESTIMATED AVERAGE GLUCOSE: 160 MG/DL
GLUCOSE SERPL-MCNC: 69 MG/DL
HBA1C MFR BLD HPLC: 7.2 %
POTASSIUM SERPL-SCNC: 4.4 MMOL/L
PROT SERPL-MCNC: 6.5 G/DL
SODIUM SERPL-SCNC: 145 MMOL/L

## 2025-01-28 ENCOUNTER — APPOINTMENT (OUTPATIENT)
Dept: INTERNAL MEDICINE | Facility: CLINIC | Age: 65
End: 2025-01-28
Payer: COMMERCIAL

## 2025-01-28 VITALS
OXYGEN SATURATION: 98 % | DIASTOLIC BLOOD PRESSURE: 80 MMHG | SYSTOLIC BLOOD PRESSURE: 124 MMHG | HEIGHT: 72 IN | HEART RATE: 61 BPM | RESPIRATION RATE: 15 BRPM | TEMPERATURE: 98 F | BODY MASS INDEX: 25.06 KG/M2 | WEIGHT: 185 LBS

## 2025-01-28 DIAGNOSIS — R05.9 COUGH, UNSPECIFIED: ICD-10-CM

## 2025-01-28 PROCEDURE — 99213 OFFICE O/P EST LOW 20 MIN: CPT

## 2025-01-28 RX ORDER — BENZONATATE 200 MG/1
200 CAPSULE ORAL
Qty: 40 | Refills: 1 | Status: ACTIVE | COMMUNITY
Start: 2025-01-28 | End: 1900-01-01

## 2025-01-28 RX ORDER — FLUTICASONE PROPIONATE 50 MCG
50 SPRAY, SUSPENSION (ML) NASAL
Qty: 1 | Refills: 1 | Status: ACTIVE | COMMUNITY
Start: 2025-01-28 | End: 1900-01-01

## 2025-01-28 RX ORDER — CETIRIZINE HYDROCHLORIDE 10 MG/1
10 TABLET, FILM COATED ORAL
Qty: 14 | Refills: 0 | Status: ACTIVE | COMMUNITY
Start: 2025-01-28 | End: 1900-01-01

## 2025-04-07 ENCOUNTER — APPOINTMENT (OUTPATIENT)
Dept: INTERNAL MEDICINE | Facility: CLINIC | Age: 65
End: 2025-04-07

## 2025-04-07 VITALS
DIASTOLIC BLOOD PRESSURE: 70 MMHG | HEART RATE: 71 BPM | OXYGEN SATURATION: 97 % | SYSTOLIC BLOOD PRESSURE: 145 MMHG | RESPIRATION RATE: 15 BRPM | TEMPERATURE: 97.9 F | HEIGHT: 72 IN

## 2025-04-07 DIAGNOSIS — E78.5 HYPERLIPIDEMIA, UNSPECIFIED: ICD-10-CM

## 2025-04-07 DIAGNOSIS — Z79.4 TYPE 2 DIABETES MELLITUS W/OUT COMPLICATIONS: ICD-10-CM

## 2025-04-07 DIAGNOSIS — E11.9 TYPE 2 DIABETES MELLITUS W/OUT COMPLICATIONS: ICD-10-CM

## 2025-04-07 DIAGNOSIS — Z13.820 ENCOUNTER FOR SCREENING FOR OSTEOPOROSIS: ICD-10-CM

## 2025-04-07 DIAGNOSIS — I10 ESSENTIAL (PRIMARY) HYPERTENSION: ICD-10-CM

## 2025-04-07 LAB
CHOLEST SERPL-MCNC: 153 MG/DL
ESTIMATED AVERAGE GLUCOSE: 166 MG/DL
HBA1C MFR BLD HPLC: 7.4 %
HDLC SERPL-MCNC: 69 MG/DL
LDLC SERPL-MCNC: 71 MG/DL
NONHDLC SERPL-MCNC: 84 MG/DL
TRIGL SERPL-MCNC: 63 MG/DL

## 2025-04-07 PROCEDURE — 99204 OFFICE O/P NEW MOD 45 MIN: CPT

## 2025-04-07 PROCEDURE — G2211 COMPLEX E/M VISIT ADD ON: CPT

## 2025-04-10 ENCOUNTER — RESULT REVIEW (OUTPATIENT)
Age: 65
End: 2025-04-10

## 2025-04-10 ENCOUNTER — APPOINTMENT (OUTPATIENT)
Dept: MAMMOGRAPHY | Facility: CLINIC | Age: 65
End: 2025-04-10
Payer: MEDICARE

## 2025-04-10 ENCOUNTER — OUTPATIENT (OUTPATIENT)
Dept: OUTPATIENT SERVICES | Facility: HOSPITAL | Age: 65
LOS: 1 days | End: 2025-04-10
Payer: MEDICARE

## 2025-04-10 DIAGNOSIS — Z12.39 ENCOUNTER FOR OTHER SCREENING FOR MALIGNANT NEOPLASM OF BREAST: ICD-10-CM

## 2025-04-10 DIAGNOSIS — Z13.820 ENCOUNTER FOR SCREENING FOR OSTEOPOROSIS: ICD-10-CM

## 2025-04-10 PROCEDURE — 77067 SCR MAMMO BI INCL CAD: CPT | Mod: 26

## 2025-04-10 PROCEDURE — 77063 BREAST TOMOSYNTHESIS BI: CPT

## 2025-04-10 PROCEDURE — 77063 BREAST TOMOSYNTHESIS BI: CPT | Mod: 26

## 2025-04-10 PROCEDURE — 77067 SCR MAMMO BI INCL CAD: CPT

## 2025-05-15 ENCOUNTER — APPOINTMENT (OUTPATIENT)
Dept: INTERNAL MEDICINE | Facility: CLINIC | Age: 65
End: 2025-05-15
Payer: MEDICARE

## 2025-05-15 VITALS
BODY MASS INDEX: 24.92 KG/M2 | SYSTOLIC BLOOD PRESSURE: 135 MMHG | TEMPERATURE: 97.8 F | DIASTOLIC BLOOD PRESSURE: 80 MMHG | RESPIRATION RATE: 15 BRPM | OXYGEN SATURATION: 98 % | HEIGHT: 72 IN | WEIGHT: 184 LBS | HEART RATE: 80 BPM

## 2025-05-15 DIAGNOSIS — I10 ESSENTIAL (PRIMARY) HYPERTENSION: ICD-10-CM

## 2025-05-15 DIAGNOSIS — Z79.4 TYPE 2 DIABETES MELLITUS W/OUT COMPLICATIONS: ICD-10-CM

## 2025-05-15 DIAGNOSIS — E11.9 TYPE 2 DIABETES MELLITUS W/OUT COMPLICATIONS: ICD-10-CM

## 2025-05-15 DIAGNOSIS — Z23 ENCOUNTER FOR IMMUNIZATION: ICD-10-CM

## 2025-05-15 PROCEDURE — G2211 COMPLEX E/M VISIT ADD ON: CPT

## 2025-05-15 PROCEDURE — 90739 HEPB VACC 2/4 DOSE ADULT IM: CPT

## 2025-05-15 PROCEDURE — G0010: CPT

## 2025-05-15 PROCEDURE — 99204 OFFICE O/P NEW MOD 45 MIN: CPT

## 2025-05-15 RX ORDER — INSULIN GLARGINE 100 [IU]/ML
100 INJECTION, SOLUTION SUBCUTANEOUS
Qty: 1 | Refills: 3 | Status: ACTIVE | COMMUNITY
Start: 2025-05-15 | End: 1900-01-01

## 2025-05-30 ENCOUNTER — NON-APPOINTMENT (OUTPATIENT)
Age: 65
End: 2025-05-30

## 2025-08-02 ENCOUNTER — EMERGENCY (EMERGENCY)
Facility: HOSPITAL | Age: 65
LOS: 0 days | Discharge: HOME | End: 2025-08-02
Attending: STUDENT IN AN ORGANIZED HEALTH CARE EDUCATION/TRAINING PROGRAM
Payer: MEDICARE

## 2025-08-02 VITALS
SYSTOLIC BLOOD PRESSURE: 106 MMHG | RESPIRATION RATE: 18 BRPM | WEIGHT: 189.82 LBS | HEART RATE: 82 BPM | TEMPERATURE: 99 F | OXYGEN SATURATION: 98 % | DIASTOLIC BLOOD PRESSURE: 50 MMHG

## 2025-08-02 DIAGNOSIS — S52.502A UNSPECIFIED FRACTURE OF THE LOWER END OF LEFT RADIUS, INITIAL ENCOUNTER FOR CLOSED FRACTURE: ICD-10-CM

## 2025-08-02 DIAGNOSIS — R51.9 HEADACHE, UNSPECIFIED: ICD-10-CM
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
## 2025-08-02 PROCEDURE — 73090 X-RAY EXAM OF FOREARM: CPT | Mod: 26,LT

## 2025-08-02 PROCEDURE — 73110 X-RAY EXAM OF WRIST: CPT | Mod: 26,LT,76

## 2025-08-02 PROCEDURE — 72125 CT NECK SPINE W/O DYE: CPT

## 2025-08-02 PROCEDURE — 73070 X-RAY EXAM OF ELBOW: CPT | Mod: LT

## 2025-08-02 PROCEDURE — 70450 CT HEAD/BRAIN W/O DYE: CPT

## 2025-08-02 PROCEDURE — 99284 EMERGENCY DEPT VISIT MOD MDM: CPT | Mod: 25

## 2025-08-02 PROCEDURE — 99285 EMERGENCY DEPT VISIT HI MDM: CPT

## 2025-08-02 PROCEDURE — 72125 CT NECK SPINE W/O DYE: CPT | Mod: 26

## 2025-08-02 PROCEDURE — 73080 X-RAY EXAM OF ELBOW: CPT | Mod: 26,LT

## 2025-08-02 PROCEDURE — 25605 CLTX DST RDL FX/EPHYS SEP W/: CPT | Mod: LT

## 2025-08-02 PROCEDURE — 73090 X-RAY EXAM OF FOREARM: CPT | Mod: LT

## 2025-08-02 PROCEDURE — 73070 X-RAY EXAM OF ELBOW: CPT | Mod: 26,LT

## 2025-08-02 PROCEDURE — 70450 CT HEAD/BRAIN W/O DYE: CPT | Mod: 26

## 2025-08-02 PROCEDURE — 73110 X-RAY EXAM OF WRIST: CPT | Mod: LT

## 2025-08-02 PROCEDURE — 73080 X-RAY EXAM OF ELBOW: CPT | Mod: LT

## 2025-08-02 RX ORDER — ACETAMINOPHEN 500 MG/5ML
650 LIQUID (ML) ORAL ONCE
Refills: 0 | Status: COMPLETED | OUTPATIENT
Start: 2025-08-02 | End: 2025-08-02

## 2025-08-02 RX ADMIN — Medication 650 MILLIGRAM(S): at 12:33

## 2025-08-11 ENCOUNTER — APPOINTMENT (OUTPATIENT)
Dept: INTERNAL MEDICINE | Facility: CLINIC | Age: 65
End: 2025-08-11
Payer: MEDICARE

## 2025-08-11 VITALS
BODY MASS INDEX: 25.73 KG/M2 | WEIGHT: 190 LBS | SYSTOLIC BLOOD PRESSURE: 100 MMHG | RESPIRATION RATE: 15 BRPM | DIASTOLIC BLOOD PRESSURE: 62 MMHG | HEIGHT: 72 IN | TEMPERATURE: 97.6 F | OXYGEN SATURATION: 100 % | HEART RATE: 62 BPM

## 2025-08-11 DIAGNOSIS — Z01.818 ENCOUNTER FOR OTHER PREPROCEDURAL EXAMINATION: ICD-10-CM

## 2025-08-11 LAB — HBA1C MFR BLD HPLC: 6.2

## 2025-08-11 PROCEDURE — 83036 HEMOGLOBIN GLYCOSYLATED A1C: CPT | Mod: QW

## 2025-08-11 PROCEDURE — 93000 ELECTROCARDIOGRAM COMPLETE: CPT

## 2025-08-11 PROCEDURE — G2211 COMPLEX E/M VISIT ADD ON: CPT

## 2025-08-11 PROCEDURE — 99213 OFFICE O/P EST LOW 20 MIN: CPT

## 2025-08-22 ENCOUNTER — LABORATORY RESULT (OUTPATIENT)
Age: 65
End: 2025-08-22

## 2025-08-22 ENCOUNTER — APPOINTMENT (OUTPATIENT)
Dept: INTERNAL MEDICINE | Facility: CLINIC | Age: 65
End: 2025-08-22

## 2025-08-22 VITALS
RESPIRATION RATE: 15 BRPM | TEMPERATURE: 97.9 F | SYSTOLIC BLOOD PRESSURE: 131 MMHG | DIASTOLIC BLOOD PRESSURE: 74 MMHG | WEIGHT: 183 LBS | BODY MASS INDEX: 24.79 KG/M2 | HEART RATE: 75 BPM | HEIGHT: 72 IN | OXYGEN SATURATION: 100 %

## 2025-08-22 DIAGNOSIS — E78.5 HYPERLIPIDEMIA, UNSPECIFIED: ICD-10-CM

## 2025-08-22 DIAGNOSIS — Z00.00 ENCOUNTER FOR GENERAL ADULT MEDICAL EXAMINATION W/OUT ABNORMAL FINDINGS: ICD-10-CM

## 2025-08-22 DIAGNOSIS — I10 ESSENTIAL (PRIMARY) HYPERTENSION: ICD-10-CM

## 2025-08-22 DIAGNOSIS — Z79.4 TYPE 2 DIABETES MELLITUS W/OUT COMPLICATIONS: ICD-10-CM

## 2025-08-22 DIAGNOSIS — Z23 ENCOUNTER FOR IMMUNIZATION: ICD-10-CM

## 2025-08-22 DIAGNOSIS — E11.9 TYPE 2 DIABETES MELLITUS W/OUT COMPLICATIONS: ICD-10-CM

## 2025-08-22 DIAGNOSIS — R74.8 ABNORMAL LEVELS OF OTHER SERUM ENZYMES: ICD-10-CM

## 2025-08-22 PROCEDURE — G2211 COMPLEX E/M VISIT ADD ON: CPT

## 2025-08-22 PROCEDURE — 90684 PCV21 VACCINE IM: CPT

## 2025-08-22 PROCEDURE — 36415 COLL VENOUS BLD VENIPUNCTURE: CPT

## 2025-08-22 PROCEDURE — 99213 OFFICE O/P EST LOW 20 MIN: CPT | Mod: 25

## 2025-08-22 PROCEDURE — G0439: CPT

## 2025-08-22 PROCEDURE — G0009: CPT

## 2025-08-26 DIAGNOSIS — R79.89 OTHER SPECIFIED ABNORMAL FINDINGS OF BLOOD CHEMISTRY: ICD-10-CM

## 2025-08-26 LAB
ALBUMIN SERPL ELPH-MCNC: 3.9 G/DL
ALP BLD-CCNC: 113 U/L
ALT SERPL-CCNC: 26 U/L
ANION GAP SERPL CALC-SCNC: 12 MMOL/L
AST SERPL-CCNC: 24 U/L
BASOPHILS # BLD AUTO: 0.05 K/UL
BASOPHILS NFR BLD AUTO: 0.8 %
BILIRUB SERPL-MCNC: 0.6 MG/DL
BUN SERPL-MCNC: 24 MG/DL
CALCIUM SERPL-MCNC: 9.8 MG/DL
CHLORIDE SERPL-SCNC: 104 MMOL/L
CHOLEST SERPL-MCNC: 122 MG/DL
CO2 SERPL-SCNC: 24 MMOL/L
CREAT SERPL-MCNC: 1.06 MG/DL
EGFRCR SERPLBLD CKD-EPI 2021: 58 ML/MIN/1.73M2
EOSINOPHIL # BLD AUTO: 0.1 K/UL
EOSINOPHIL NFR BLD AUTO: 1.7 %
ESTIMATED AVERAGE GLUCOSE: 131 MG/DL
GLUCOSE SERPL-MCNC: 122 MG/DL
HBA1C MFR BLD HPLC: 6.2 %
HCT VFR BLD CALC: 34.2 %
HDLC SERPL-MCNC: 48 MG/DL
HGB BLD-MCNC: 10.8 G/DL
IMM GRANULOCYTES NFR BLD AUTO: 0.3 %
LDLC SERPL-MCNC: 59 MG/DL
LYMPHOCYTES # BLD AUTO: 1.65 K/UL
LYMPHOCYTES NFR BLD AUTO: 27.4 %
MAN DIFF?: NORMAL
MCHC RBC-ENTMCNC: 29.5 PG
MCHC RBC-ENTMCNC: 31.6 G/DL
MCV RBC AUTO: 93.4 FL
MONOCYTES # BLD AUTO: 0.46 K/UL
MONOCYTES NFR BLD AUTO: 7.6 %
NEUTROPHILS # BLD AUTO: 3.74 K/UL
NEUTROPHILS NFR BLD AUTO: 62.2 %
NONHDLC SERPL-MCNC: 74 MG/DL
PLATELET # BLD AUTO: 265 K/UL
POTASSIUM SERPL-SCNC: 4.8 MMOL/L
PROT SERPL-MCNC: 7.1 G/DL
RBC # BLD: 3.66 M/UL
RBC # FLD: 14.1 %
SODIUM SERPL-SCNC: 140 MMOL/L
TRIGL SERPL-MCNC: 74 MG/DL
TSH SERPL-ACNC: 0.17 UIU/ML
WBC # FLD AUTO: 6.02 K/UL

## 2025-08-29 ENCOUNTER — OUTPATIENT (OUTPATIENT)
Dept: OUTPATIENT SERVICES | Facility: HOSPITAL | Age: 65
LOS: 1 days | End: 2025-08-29
Payer: MEDICARE

## 2025-08-29 ENCOUNTER — APPOINTMENT (OUTPATIENT)
Dept: ULTRASOUND IMAGING | Facility: CLINIC | Age: 65
End: 2025-08-29
Payer: MEDICARE

## 2025-08-29 DIAGNOSIS — R79.89 OTHER SPECIFIED ABNORMAL FINDINGS OF BLOOD CHEMISTRY: ICD-10-CM

## 2025-08-29 PROCEDURE — 76536 US EXAM OF HEAD AND NECK: CPT | Mod: 26

## 2025-08-29 PROCEDURE — 76536 US EXAM OF HEAD AND NECK: CPT

## 2025-09-05 DIAGNOSIS — E04.1 NONTOXIC SINGLE THYROID NODULE: ICD-10-CM
